# Patient Record
Sex: MALE | Race: WHITE | HISPANIC OR LATINO | Employment: OTHER | URBAN - METROPOLITAN AREA
[De-identification: names, ages, dates, MRNs, and addresses within clinical notes are randomized per-mention and may not be internally consistent; named-entity substitution may affect disease eponyms.]

---

## 2017-01-09 DIAGNOSIS — Z01.810 PREPROCEDURAL CARDIOVASCULAR EXAMINATION: ICD-10-CM

## 2017-01-09 DIAGNOSIS — Z01.812 PRE-PROCEDURAL LABORATORY EXAMINATIONS: ICD-10-CM

## 2017-01-09 LAB
ALBUMIN SERPL BCP-MCNC: 4.3 G/DL (ref 3.2–4.9)
ALBUMIN/GLOB SERPL: 1.5 G/DL
ALP SERPL-CCNC: 69 U/L (ref 30–99)
ALT SERPL-CCNC: 22 U/L (ref 2–50)
ANION GAP SERPL CALC-SCNC: 7 MMOL/L (ref 0–11.9)
AST SERPL-CCNC: 13 U/L (ref 12–45)
BASOPHILS # BLD AUTO: 0.4 % (ref 0–1.8)
BASOPHILS # BLD: 0.02 K/UL (ref 0–0.12)
BILIRUB SERPL-MCNC: 0.4 MG/DL (ref 0.1–1.5)
BUN SERPL-MCNC: 13 MG/DL (ref 8–22)
CALCIUM SERPL-MCNC: 9.3 MG/DL (ref 8.5–10.5)
CHLORIDE SERPL-SCNC: 103 MMOL/L (ref 96–112)
CO2 SERPL-SCNC: 27 MMOL/L (ref 20–33)
CREAT SERPL-MCNC: 0.87 MG/DL (ref 0.5–1.4)
EOSINOPHIL # BLD AUTO: 0.11 K/UL (ref 0–0.51)
EOSINOPHIL NFR BLD: 2 % (ref 0–6.9)
ERYTHROCYTE [DISTWIDTH] IN BLOOD BY AUTOMATED COUNT: 43.6 FL (ref 35.9–50)
GFR SERPL CREATININE-BSD FRML MDRD: >60 ML/MIN/1.73 M 2
GLOBULIN SER CALC-MCNC: 2.8 G/DL (ref 1.9–3.5)
GLUCOSE SERPL-MCNC: 119 MG/DL (ref 65–99)
HCT VFR BLD AUTO: 43.5 % (ref 42–52)
HGB BLD-MCNC: 14.7 G/DL (ref 14–18)
IMM GRANULOCYTES # BLD AUTO: 0.02 K/UL (ref 0–0.11)
IMM GRANULOCYTES NFR BLD AUTO: 0.4 % (ref 0–0.9)
INR PPP: 1.34 (ref 0.87–1.13)
LYMPHOCYTES # BLD AUTO: 1.37 K/UL (ref 1–4.8)
LYMPHOCYTES NFR BLD: 24.5 % (ref 22–41)
MCH RBC QN AUTO: 30.9 PG (ref 27–33)
MCHC RBC AUTO-ENTMCNC: 33.8 G/DL (ref 33.7–35.3)
MCV RBC AUTO: 91.6 FL (ref 81.4–97.8)
MONOCYTES # BLD AUTO: 0.39 K/UL (ref 0–0.85)
MONOCYTES NFR BLD AUTO: 7 % (ref 0–13.4)
NEUTROPHILS # BLD AUTO: 3.69 K/UL (ref 1.82–7.42)
NEUTROPHILS NFR BLD: 65.7 % (ref 44–72)
NRBC # BLD AUTO: 0 K/UL
NRBC BLD AUTO-RTO: 0 /100 WBC
PLATELET # BLD AUTO: 194 K/UL (ref 164–446)
PMV BLD AUTO: 11.3 FL (ref 9–12.9)
POTASSIUM SERPL-SCNC: 3.7 MMOL/L (ref 3.6–5.5)
PROT SERPL-MCNC: 7.1 G/DL (ref 6–8.2)
PROTHROMBIN TIME: 17 SEC (ref 12–14.6)
RBC # BLD AUTO: 4.75 M/UL (ref 4.7–6.1)
SODIUM SERPL-SCNC: 137 MMOL/L (ref 135–145)
WBC # BLD AUTO: 5.6 K/UL (ref 4.8–10.8)

## 2017-01-09 PROCEDURE — 85610 PROTHROMBIN TIME: CPT

## 2017-01-09 PROCEDURE — 36415 COLL VENOUS BLD VENIPUNCTURE: CPT

## 2017-01-09 PROCEDURE — 80053 COMPREHEN METABOLIC PANEL: CPT

## 2017-01-09 PROCEDURE — 85025 COMPLETE CBC W/AUTO DIFF WBC: CPT

## 2017-01-10 ENCOUNTER — RESOLUTE PROFESSIONAL BILLING HOSPITAL PROF FEE (OUTPATIENT)
Dept: CARDIOLOGY | Facility: MEDICAL CENTER | Age: 57
End: 2017-01-10
Payer: COMMERCIAL

## 2017-01-10 LAB — EKG IMPRESSION: NORMAL

## 2017-01-10 PROCEDURE — 700111 HCHG RX REV CODE 636 W/ 250 OVERRIDE (IP)

## 2017-01-10 RX ORDER — MIDAZOLAM HYDROCHLORIDE 1 MG/ML
INJECTION INTRAMUSCULAR; INTRAVENOUS
Status: DISPENSED
Start: 2017-01-10 | End: 2017-01-10

## 2017-01-11 PROCEDURE — 99217 PR OBSERVATION CARE DISCHARGE: CPT | Performed by: NURSE PRACTITIONER

## 2017-02-02 ENCOUNTER — NON-PROVIDER VISIT (OUTPATIENT)
Dept: CARDIOLOGY | Facility: MEDICAL CENTER | Age: 57
End: 2017-02-02
Payer: COMMERCIAL

## 2017-02-02 ENCOUNTER — OFFICE VISIT (OUTPATIENT)
Dept: CARDIOLOGY | Facility: MEDICAL CENTER | Age: 57
End: 2017-02-02
Payer: COMMERCIAL

## 2017-02-02 VITALS
WEIGHT: 272 LBS | HEIGHT: 69 IN | DIASTOLIC BLOOD PRESSURE: 78 MMHG | BODY MASS INDEX: 40.29 KG/M2 | SYSTOLIC BLOOD PRESSURE: 118 MMHG | OXYGEN SATURATION: 94 % | HEART RATE: 64 BPM

## 2017-02-02 VITALS
BODY MASS INDEX: 41.03 KG/M2 | DIASTOLIC BLOOD PRESSURE: 78 MMHG | HEART RATE: 64 BPM | WEIGHT: 278 LBS | SYSTOLIC BLOOD PRESSURE: 118 MMHG

## 2017-02-02 DIAGNOSIS — Z95.0 CARDIAC PACEMAKER IN SITU: ICD-10-CM

## 2017-02-02 DIAGNOSIS — G47.33 OSA (OBSTRUCTIVE SLEEP APNEA): ICD-10-CM

## 2017-02-02 DIAGNOSIS — Z79.01 CHRONIC ANTICOAGULATION: ICD-10-CM

## 2017-02-02 DIAGNOSIS — Z86.79 S/P ABLATION OF ATRIAL FIBRILLATION: ICD-10-CM

## 2017-02-02 DIAGNOSIS — I48.19 PERSISTENT ATRIAL FIBRILLATION (HCC): ICD-10-CM

## 2017-02-02 DIAGNOSIS — Z86.79 STATUS POST ABLATION OF ATRIAL FIBRILLATION: ICD-10-CM

## 2017-02-02 DIAGNOSIS — Z79.899 HIGH RISK MEDICATION USE: ICD-10-CM

## 2017-02-02 DIAGNOSIS — Z98.890 STATUS POST ABLATION OF ATRIAL FIBRILLATION: ICD-10-CM

## 2017-02-02 DIAGNOSIS — Z98.890 S/P ABLATION OF ATRIAL FIBRILLATION: ICD-10-CM

## 2017-02-02 PROCEDURE — 99214 OFFICE O/P EST MOD 30 MIN: CPT | Mod: 25 | Performed by: NURSE PRACTITIONER

## 2017-02-02 PROCEDURE — 93280 PM DEVICE PROGR EVAL DUAL: CPT | Performed by: INTERNAL MEDICINE

## 2017-02-02 ASSESSMENT — ENCOUNTER SYMPTOMS
HEMOPTYSIS: 0
VOMITING: 0
PALPITATIONS: 0
TREMORS: 0
WHEEZING: 0
HEARTBURN: 0
COUGH: 0
BLURRED VISION: 0
DIARRHEA: 0
SORE THROAT: 0
SHORTNESS OF BREATH: 0
BLOOD IN STOOL: 0
CHILLS: 0
ORTHOPNEA: 0
TINGLING: 0
WEIGHT LOSS: 0
FOCAL WEAKNESS: 0
PND: 0
SPUTUM PRODUCTION: 0
NAUSEA: 0
HEADACHES: 0
SPEECH CHANGE: 0
ABDOMINAL PAIN: 0
DOUBLE VISION: 0
LOSS OF CONSCIOUSNESS: 0
SENSORY CHANGE: 0
FEVER: 0
DIZZINESS: 0
WEAKNESS: 0

## 2017-02-02 NOTE — MR AVS SNAPSHOT
"        Juan Martel   2017 2:00 PM   Office Visit   MRN: [id number]    Department:  [facility]   Dept Phone:  [phone]    Description:  Male : 1960   Provider:  ALEX Rodriguez           Reason for Visit     Hospital Follow-up           Allergies as of 2017     Allergen Noted Reactions    Bloodless 2016       Codeine 2016   Vomiting    Tramadol 2017   Vomiting, Nausea      You were diagnosed with     Status post ablation of atrial fibrillation   [[id number]]         Vital Signs     Blood Pressure Pulse Height Weight Body Mass Index Oxygen Saturation    118/78 mmHg 64 1.753 m (5' 9.02\") 123.378 kg (272 lb) 40.15 kg/m2 94%    Smoking Status                   Never Smoker            Basic Information     Date Of Birth Sex Race Ethnicity Preferred Language    1960 Male White  Origin (Telugu,Malagasy,Haitian,Dominican, etc) English      Your appointments     Mar 22, 2017  3:00 PM   PACER CHECK ONLY with PACER CHECK-[facility]   [facility] (--)    [address], [address]  [place] [id number] [phone]            Mar 22, 2017  3:40 PM   FOLLOW UP with Darion Raza M.D.   [facility] (--)    [address], [address]  [place] [address]   [phone]              Problem List              ICD-10-CM Priority Class Noted - Resolved    Near syncope R55   2016 - Present    Nausea and vomiting R11.2   2016 - Present    Chest pain R07.9   2016 - Present    Atrial fibrillation (CMS-[facility]) I48.91   2016 - Present    Chronic anticoagulation Z79.01   2016 - Present    Snoring R06.83   2016 - Present    ARCHANA (obstructive sleep apnea) G47.33   6/15/2016 - Present    High risk medication use Z79.899   2016 - Present    AF (atrial fibrillation) (CMS-[facility]) I48.91   2016 - Present    Cardiac pacemaker in situ Z95.0   2016 - Present      Health Maintenance "        Date Due Completion Dates    IMM DTaP/Tdap/Td Vaccine (1 - Tdap) 3/14/1977 ---    COLONOSCOPY 3/14/2008 ---    IMM INFLUENZA (1) 9/1/2016 ---            Results       Current Immunizations     No immunizations on file.      Below and/or attached are the medications your provider expects you to take. Review all of your home medications and newly ordered medications with your provider and/or pharmacist. Follow medication instructions as directed by your provider and/or pharmacist. Please keep your medication list with you and share with your provider. Update the information when medications are discontinued, doses are changed, or new medications (including over-the-counter products) are added; and carry medication information at all times in the event of emergency situations     Allergies:  BLOODLESS - (reactions not documented)     CODEINE - Vomiting     TRAMADOL - Vomiting,Nausea               Medications  Valid as of: February 02, 2017 -  2:34 PM    Generic Name Brand Name Tablet Size Instructions for use    Allopurinol (Tab) ZYLOPRIM 300 MG Take 300 mg by mouth every evening.        Dofetilide (Cap) TIKOSYN 500 MCG Take 1 Cap by mouth every 12 hours.        Magnesium Chloride (Tab CR) SLO- (64 MG) MG Take 1 Tab by mouth 2 Times a Day.        Metoprolol Succinate (TABLET SR 24 HR) TOPROL XL 25 MG Take 1 Tab by mouth every day.        Omeprazole (CAPSULE DELAYED RELEASE) PRILOSEC 20 MG Take 1 Cap by mouth every morning before breakfast.        Rivaroxaban (Tab) XARELTO 20 MG Take 1 Tab by mouth with dinner.        Sucralfate (Tab) CARAFATE 1 GM Take 1 Tab by mouth 4 Times a Day,Before Meals and at Bedtime.        TraMADol HCl (Tab) ULTRAM 50 MG Take 1 Tab by mouth every 8 hours as needed for Moderate Pain.        .                 Medicines prescribed today were sent to:     [facility]/PHARMACY #[id number] - [place], [place] - [address]    [address] [place] [address]    Phone: [phone] Fax: [phone]     Open 24 Hours?: No      Medication refill instructions:       If your prescription bottle indicates you have medication refills left, it is not necessary to call your provider’s office. Please contact your pharmacy and they will refill your medication.    If your prescription bottle indicates you do not have any refills left, you may request refills at any time through one of the following ways: The online [employer] system (except Urgent Care), by calling your provider’s office, or by asking your pharmacy to contact your provider’s office with a refill request. Medication refills are processed only during regular business hours and may not be available until the next business day. Your provider may request additional information or to have a follow-up visit with you prior to refilling your medication.   *Please Note: Medication refills are assigned a new Rx number when refilled electronically. Your pharmacy may indicate that no refills were authorized even though a new prescription for the same medication is available at the pharmacy. Please request the medicine by name with the pharmacy before contacting your provider for a refill.           [employer] Access Code: [email or web address]  Expires: 3/1/2017 10:47 AM    [employer]  A secure, online tool to manage your health information     [employer]’s [employer]® is a secure, online tool that connects you to your personalized health information from the privacy of your home -- day or night - making it very easy for you to manage your healthcare. Once the activation process is completed, you can even access your medical information using the [employer] drew, which is available for free in the Apple Drew store or Google Play store.     [employer] provides the following levels of access (as shown below):   My Chart Features   [facility] Doctor [facility]  Specialists [facility]  Urgent  Care [facility]  Primary Care  Doctor   Email your healthcare team securely and privately 24/7 X X X     Manage appointments: schedule your next appointment; view details of past/upcoming appointments X      Request prescription refills. X      View recent personal medical records, including lab and immunizations X X X X   View health record, including health history, allergies, medications X X X X   Read reports about your outpatient visits, procedures, consult and ER notes X X X X   See your discharge summary, which is a recap of your hospital and/or ER visit that includes your diagnosis, lab results, and care plan. X X       How to register for [employer]:  1. Go to  [email or web address].  2. Click on the Sign Up Now box, which takes you to the New Member Sign Up page. You will need to provide the following information:  a. Enter your Op[employer] Access Code exactly as it appears at the top of this page. (You will not need to use this code after you’ve completed the sign-up process. If you do not sign up before the expiration date, you must request a new code.)   b. Enter your date of birth.   c. Enter your home email address.   d. Click Submit, and follow the next screen’s instructions.  3. Create a Opzi ID. This will be your Opzi login ID and cannot be changed, so think of one that is secure and easy to remember.  4. Create a Opzi password. You can change your password at any time.  5. Enter your Password Reset Question and Answer. This can be used at a later time if you forget your password.   6. Enter your e-mail address. This allows you to receive e-mail notifications when new information is available in Opzi.  7. Click Sign Up. You can now view your health information.    For assistance activating your Opzi account, call [phone]

## 2017-02-02 NOTE — NON-PROVIDER
Final threshold testing today. Appropriate device function demonstrated. Wound site appears clear. See back in 3 months.

## 2017-02-02 NOTE — PROGRESS NOTES
Subjective:   Juan Martel is a 56 y.o. male who presents today for hospital follow up after EPS and ablation by Dr. Raza 1/10/17.      History significant for Afib, chronic anticoagulation, BSI PPM 12/16, ARCHANA with compliant CPAP use.      Procedural conclusions per Dr. Raza' op note:  CONCLUSION:  1.  Successful isolation of all 4 pulmonary veins.  2.  Induction of atrial flutter.  3.  Successful cavotricuspid isthmus line to eliminate this flutter.  4.  No inducible tachycardia at the end of the study.   DISCUSSION:  Patient did very well with the procedure.  He will continue with    his Tikosyn for now.  He will continue his oral anticoagulation.  We will give   him a month of sucralfate and Protonix.  I think that our result is quite    promising.  We will see how the patient does clinically.    Today in follow up he states that he has overall been feeling very well post ablation.  He states that he had a few days of chest discomfort and fatigue immedicately post ablation, but resolved and has not retuned.  He isn't really aware of having any afib post ablation.    He has had no chest pain, dyspnea, dizziness, presyncope, syncope, peripheral edema.      He states that he as been compliant with his medications but did forget his Tikosyn one night.  He has been compliant with his CPAP.  He has been walking daily, trying to get between 5,000 and 7,000 steps a day.     Past Medical History   Diagnosis Date   • Gout    • A-fib (CMS-HCC)    • At risk for sleep apnea    • Cardiac pacemaker in situ 12/20/2016   • Sleep apnea      CPAP   • Pneumonia 2000   • Cardiac asystole (CMS-HCC) 12/20/16     possible vaso-vagal     Past Surgical History   Procedure Laterality Date   • Other orthopedic surgery  6/1/1990     achilles tendon repair left foot   • Other cardiac surgery  12/20/16     pacemaker     Family History   Problem Relation Age of Onset   • Other Mother    • Heart Disease Mother      atrial fib   • Stroke  Father 65     CVa   • Diabetes Father    • Stroke Paternal Grandmother    • Cancer Sister      History   Smoking status   • Never Smoker    Smokeless tobacco   • Never Used     Allergies   Allergen Reactions   • Bloodless    • Codeine Vomiting   • Tramadol Vomiting and Nausea     Outpatient Encounter Prescriptions as of 2/2/2017   Medication Sig Dispense Refill   • omeprazole (PRILOSEC) 20 MG delayed-release capsule Take 1 Cap by mouth every morning before breakfast. 30 Cap 0   • sucralfate (CARAFATE) 1 GM Tab Take 1 Tab by mouth 4 Times a Day,Before Meals and at Bedtime. 120 Tab 0   • tramadol (ULTRAM) 50 MG Tab Take 1 Tab by mouth every 8 hours as needed for Moderate Pain. 20 Tab 0   • metoprolol SR (TOPROL XL) 25 MG TABLET SR 24 HR Take 1 Tab by mouth every day. 30 Tab 3   • magnesium chloride SR (SLO-MAG) 535 (64 MG) MG Tab CR Take 1 Tab by mouth 2 Times a Day. 60 Tab 0   • dofetilide (TIKOSYN) 500 MCG Cap Take 1 Cap by mouth every 12 hours. 60 Cap 4   • rivaroxaban (XARELTO) 20 MG Tab tablet Take 1 Tab by mouth with dinner. 30 Tab 11   • allopurinol (ZYLOPRIM) 300 MG Tab Take 300 mg by mouth every evening.       No facility-administered encounter medications on file as of 2/2/2017.     Review of Systems   Constitutional: Negative for fever, chills, weight loss and malaise/fatigue.   HENT: Negative for congestion, nosebleeds, sore throat and tinnitus.    Eyes: Negative for blurred vision and double vision.   Respiratory: Negative for cough, hemoptysis, sputum production, shortness of breath and wheezing.    Cardiovascular: Negative for chest pain, palpitations, orthopnea, leg swelling and PND.   Gastrointestinal: Negative for heartburn, nausea, vomiting, abdominal pain, diarrhea, blood in stool and melena.   Genitourinary: Negative.    Musculoskeletal: Positive for joint pain (Occasional L knee- sore).   Skin: Negative for rash.   Neurological: Negative for dizziness, tingling, tremors, sensory change, speech  "change, focal weakness, loss of consciousness, weakness and headaches.        Objective:   /78 mmHg  Pulse 64  Ht 1.753 m (5' 9.02\")  Wt 123.378 kg (272 lb)  BMI 40.15 kg/m2  SpO2 94%    Physical Exam   Constitutional: He is oriented to person, place, and time. He appears well-developed and well-nourished.   HENT:   Head: Normocephalic and atraumatic.   Eyes: Conjunctivae are normal.   Neck: Normal range of motion. Neck supple. No JVD present.   Cardiovascular: Normal rate, regular rhythm, normal heart sounds and intact distal pulses.  Exam reveals no gallop and no friction rub.    No murmur heard.  Pulmonary/Chest: Effort normal and breath sounds normal. No respiratory distress. He has no wheezes. He has no rales. He exhibits no tenderness.   L chest PPM pocket well healed   Abdominal: Soft. Bowel sounds are normal. He exhibits no distension. There is no tenderness.   Musculoskeletal: He exhibits no edema.   Neurological: He is alert and oriented to person, place, and time.   Skin: Skin is warm and dry.   Psychiatric: He has a normal mood and affect. His behavior is normal.       Assessment:     1. Status post ablation of atrial fibrillation  EKG   2. Persistent atrial fibrillation (CMS-HCC)     3. Chronic anticoagulation     4. ARCHANA (obstructive sleep apnea)     5. High risk medication use     6. Cardiac pacemaker in situ     7. S/P ablation of atrial fibrillation         Medical Decision Making:  Today's Assessment / Status / Plan:   1. Afib S/P ablation:  - 12 lead EKG today NSR.  He is clinically recovering well post procedure.  BSI PPM interrogation today reveals about 10 afib episodes post ablation, most lasting <16 seconds in length.  One episode was 11 hours long - he thinks this may be when he forgot his Tikosyn.  All at nighttime.  Will continue to monitor this and Afib burden as he progresses post procedure.  - Continue Toprol, Tikosyn, Xarelto.  Continue Carafate and Prilosec until bottles " completed.   - Re rereviewed expected course of healing post ablation.     2. High Risk medication (Tikosyn):  - QTc 470/480 msec today and within limits.  Will monitor at next office visit.  - Last renal function and electrolytes WNL 1/11.     3. BSI PPM:  - Device working appropriately.  See device flow sheet for details.  - L chest Pocket well healed without erythema or induration.    4. Anticoagulation:  - denies s/s of bleeding.    Overall status today is stable.  We discussed he can bike with low resistance, easy pace as his knee allows.  Discussed HR parameters.      Discussed plan of care with the patient and he verbalizes understanding/agreement.  RTC in 6 weeks for review, sooner if needed.  Collaborating MD/ADD: Bhumika.

## 2017-02-02 NOTE — MR AVS SNAPSHOT
Juan Martel   2017 1:00 PM   Non-Provider Visit   MRN: [id number]    Department:  [facility]   Dept Phone:  [phone]    Description:  Male : 1960   Provider:  PACER CHECK-[facility]           Reason for Visit     Device Check           Allergies as of 2017     Allergen Noted Reactions    Bloodless 2016       Codeine 2016   Vomiting    Tramadol 2017   Vomiting, Nausea      You were diagnosed with     Cardiac pacemaker in situ   [V45.01.ICD-9-CM]         Vital Signs     Blood Pressure Pulse Weight Smoking Status          118/78 mmHg 64 126.1 kg (278 lb) Never Smoker         Basic Information     Date Of Birth Sex Race Ethnicity Preferred Language    1960 Male White  Origin (Wallisian,Anguillan,Mauritian,Larry, etc) English      Your appointments     Mar 22, 2017  3:00 PM   PACER CHECK ONLY with PACER CHECK-CAM B   [facility] (--)    [address], [address]  [place] [address] [phone]            Mar 22, 2017  3:40 PM   FOLLOW UP with Darion Raza M.D.   [facility] (--)    [address], [address]  [place] [address] [phone]              Problem List              ICD-10-CM Priority Class Noted - Resolved    Near syncope R55   2016 - Present    Nausea and vomiting R11.2   2016 - Present    Chest pain R07.9   2016 - Present    Atrial fibrillation (CMS-HCC) I48.91   2016 - Present    Chronic anticoagulation Z79.01   2016 - Present    Snoring R06.83   2016 - Present    ARCHANA (obstructive sleep apnea) G47.33   6/15/2016 - Present    High risk medication use Z79.899   2016 - Present    AF (atrial fibrillation) (CMS-HCC) I48.91   2016 - Present    Cardiac pacemaker in situ Z95.0   2016 - Present      Health Maintenance        Date Due Completion Dates    IMM DTaP/Tdap/Td Vaccine (1 - Tdap) 3/14/1977 ---    COLONOSCOPY 3/14/2008  ---    IMM INFLUENZA (1) 9/1/2016 ---            Current Immunizations     No immunizations on file.      Below and/or attached are the medications your provider expects you to take. Review all of your home medications and newly ordered medications with your provider and/or pharmacist. Follow medication instructions as directed by your provider and/or pharmacist. Please keep your medication list with you and share with your provider. Update the information when medications are discontinued, doses are changed, or new medications (including over-the-counter products) are added; and carry medication information at all times in the event of emergency situations     Allergies:  BLOODLESS - (reactions not documented)     CODEINE - Vomiting     TRAMADOL - Vomiting,Nausea               Medications  Valid as of: February 02, 2017 -  2:32 PM    Generic Name Brand Name Tablet Size Instructions for use    Allopurinol (Tab) ZYLOPRIM 300 MG Take 300 mg by mouth every evening.        Dofetilide (Cap) TIKOSYN 500 MCG Take 1 Cap by mouth every 12 hours.        Magnesium Chloride (Tab CR) SLO- (64 MG) MG Take 1 Tab by mouth 2 Times a Day.        Metoprolol Succinate (TABLET SR 24 HR) TOPROL XL 25 MG Take 1 Tab by mouth every day.        Omeprazole (CAPSULE DELAYED RELEASE) PRILOSEC 20 MG Take 1 Cap by mouth every morning before breakfast.        Rivaroxaban (Tab) XARELTO 20 MG Take 1 Tab by mouth with dinner.        Sucralfate (Tab) CARAFATE 1 GM Take 1 Tab by mouth 4 Times a Day,Before Meals and at Bedtime.        TraMADol HCl (Tab) ULTRAM 50 MG Take 1 Tab by mouth every 8 hours as needed for Moderate Pain.        .                 Medicines prescribed today were sent to:     [facility]/PHARMACY #[id number] - [place], [place] - [address] [address] [address] [place] [address]    Phone: [phone] Fax: [phone]    Open 24 Hours?: No      Medication refill instructions:       If your prescription bottle indicates you have medication refills  left, it is not necessary to call your provider’s office. Please contact your pharmacy and they will refill your medication.    If your prescription bottle indicates you do not have any refills left, you may request refills at any time through one of the following ways: The online [employer] system (except Urgent Care), by calling your provider’s office, or by asking your pharmacy to contact your provider’s office with a refill request. Medication refills are processed only during regular business hours and may not be available until the next business day. Your provider may request additional information or to have a follow-up visit with you prior to refilling your medication.   *Please Note: Medication refills are assigned a new Rx number when refilled electronically. Your pharmacy may indicate that no refills were authorized even though a new prescription for the same medication is available at the pharmacy. Please request the medicine by name with the pharmacy before contacting your provider for a refill.           [employer] Access Code: [email or web address]  Expires: 3/1/2017 10:47 AM    [employer]  A secure, online tool to manage your health information     [employer]s [employer]® is a secure, online tool that connects you to your personalized health information from the privacy of your home -- day or night - making it very easy for you to manage your healthcare. Once the activation process is completed, you can even access your medical information using the [employer] drew, which is available for free in the Apple Drew store or Google Play store.     [employer] 28 provides the following levels of access (as shown below):   My Chart Features   [facility] Doctor [facility]  Specialists [facility]  Urgent  Care Non-[facility]  Primary Care  Doctor   Email your healthcare team securely and privately 24/7 X X X    Manage appointments: schedule your next appointment; view details of past/upcoming appointments X      Request prescription  refills. X      View recent personal medical records, including lab and immunizations X X X X   View health record, including health history, allergies, medications X X X X   Read reports about your outpatient visits, procedures, consult and ER notes X X X X   See your discharge summary, which is a recap of your hospital and/or ER visit that includes your diagnosis, lab results, and care plan. X X       How to register for [employer]:  1. Go to  [email or web address].  2. Click on the Sign Up Now box, which takes you to the New Member Sign Up page. You will need to provide the following information:  a. Enter your [employer] Access Code exactly as it appears at the top of this page. (You will not need to use this code after you’ve completed the sign-up process. If you do not sign up before the expiration date, you must request a new code.)   b. Enter your date of birth.   c. Enter your home email address.   d. Click Submit, and follow the next screen’s instructions.  3. Create a [employer] ID. This will be your [employer] login ID and cannot be changed, so think of one that is secure and easy to remember.  4. Create a [employer] password. You can change your password at any time.  5. Enter your Password Reset Question and Answer. This can be used at a later time if you forget your password.   6. Enter your e-mail address. This allows you to receive e-mail notifications when new information is available in [employer].  7. Click Sign Up. You can now view your health information.    For assistance activating your [employer] account, call [phone]

## 2017-02-02 NOTE — Clinical Note
[facility]   [address]  [place] [address]  Phone: [phone]  Fax: [phone]              Juan Martel  1960    Encounter Date: 2/2/2017    ALEX Rodriguez          PROGRESS NOTE:  Subjective:   Juan Martel is a 56 y.o. male who presents today for hospital follow up after EPS and ablation by Dr. Raza 1/10/17.      History significant for Afib, chronic anticoagulation, BSI PPM 12/16, ARCHANA with compliant CPAP use.      Procedural conclusions per Dr. Raza' op note:  CONCLUSION:  1.  Successful isolation of all 4 pulmonary veins.  2.  Induction of atrial flutter.  3.  Successful cavotricuspid isthmus line to eliminate this flutter.  4.  No inducible tachycardia at the end of the study.   DISCUSSION:  Patient did very well with the procedure.  He will continue with    his Tikosyn for now.  He will continue his oral anticoagulation.  We will give   him a month of sucralfate and Protonix.  I think that our result is quite    promising.  We will see how the patient does clinically.    Today in follow up he states that he has overall been feeling very well post ablation.  He states that he had a few days of chest discomfort and fatigue immedicately post ablation, but resolved and has not retuned.  He isn't really aware of having any afib post ablation.    He has had no chest pain, dyspnea, dizziness, presyncope, syncope, peripheral edema.      He states that he as been compliant with his medications but did forget his Tikosyn one night.  He has been compliant with his CPAP.  He has been walking daily, trying to get between 5,000 and 7,000 steps a day.     Past Medical History   Diagnosis Date   • Gout    • A-fib (CMS-[facility])    • At risk for sleep apnea    • Cardiac pacemaker in situ 12/20/2016   • Sleep apnea      CPAP   • Pneumonia 2000   • Cardiac asystole (CMS-[facility]) 12/20/16     possible vaso-vagal     Past Surgical History   Procedure  Laterality Date   • Other orthopedic surgery  6/1/1990     achilles tendon repair left foot   • Other cardiac surgery  12/20/16     pacemaker     Family History   Problem Relation Age of Onset   • Other Mother    • Heart Disease Mother      atrial fib   • Stroke Father 65     CVa   • Diabetes Father    • Stroke Paternal Grandmother    • Cancer Sister      History   Smoking status   • Never Smoker    Smokeless tobacco   • Never Used     Allergies   Allergen Reactions   • Bloodless    • Codeine Vomiting   • Tramadol Vomiting and Nausea     Outpatient Encounter Prescriptions as of 2/2/2017   Medication Sig Dispense Refill   • omeprazole (PRILOSEC) 20 MG delayed-release capsule Take 1 Cap by mouth every morning before breakfast. 30 Cap 0   • sucralfate (CARAFATE) 1 GM Tab Take 1 Tab by mouth 4 Times a Day,Before Meals and at Bedtime. 120 Tab 0   • tramadol (ULTRAM) 50 MG Tab Take 1 Tab by mouth every 8 hours as needed for Moderate Pain. 20 Tab 0   • metoprolol SR (TOPROL XL) 25 MG TABLET SR 24 HR Take 1 Tab by mouth every day. 30 Tab 3   • magnesium chloride SR (SLO-MAG) 535 (64 MG) MG Tab CR Take 1 Tab by mouth 2 Times a Day. 60 Tab 0   • dofetilide (TIKOSYN) 500 MCG Cap Take 1 Cap by mouth every 12 hours. 60 Cap 4   • rivaroxaban (XARELTO) 20 MG Tab tablet Take 1 Tab by mouth with dinner. 30 Tab 11   • allopurinol (ZYLOPRIM) 300 MG Tab Take 300 mg by mouth every evening.       No facility-administered encounter medications on file as of 2/2/2017.     Review of Systems   Constitutional: Negative for fever, chills, weight loss and malaise/fatigue.   HENT: Negative for congestion, nosebleeds, sore throat and tinnitus.    Eyes: Negative for blurred vision and double vision.   Respiratory: Negative for cough, hemoptysis, sputum production, shortness of breath and wheezing.    Cardiovascular: Negative for chest pain, palpitations, orthopnea, leg swelling and PND.   Gastrointestinal: Negative for heartburn, nausea, vomiting,  "abdominal pain, diarrhea, blood in stool and melena.   Genitourinary: Negative.    Musculoskeletal: Positive for joint pain (Occasional L knee- sore).   Skin: Negative for rash.   Neurological: Negative for dizziness, tingling, tremors, sensory change, speech change, focal weakness, loss of consciousness, weakness and headaches.        Objective:   /78 mmHg  Pulse 64  Ht 1.753 m (5' 9.02\")  Wt 123.378 kg (272 lb)  BMI 40.15 kg/m2  SpO2 94%    Physical Exam   Constitutional: He is oriented to person, place, and time. He appears well-developed and well-nourished.   HENT:   Head: Normocephalic and atraumatic.   Eyes: Conjunctivae are normal.   Neck: Normal range of motion. Neck supple. No JVD present.   Cardiovascular: Normal rate, regular rhythm, normal heart sounds and intact distal pulses.  Exam reveals no gallop and no friction rub.    No murmur heard.  Pulmonary/Chest: Effort normal and breath sounds normal. No respiratory distress. He has no wheezes. He has no rales. He exhibits no tenderness.   L chest PPM pocket well healed   Abdominal: Soft. Bowel sounds are normal. He exhibits no distension. There is no tenderness.   Musculoskeletal: He exhibits no edema.   Neurological: He is alert and oriented to person, place, and time.   Skin: Skin is warm and dry.   Psychiatric: He has a normal mood and affect. His behavior is normal.       Assessment:     1. Status post ablation of atrial fibrillation  EKG   2. Persistent atrial fibrillation (CMS-HCC)     3. Chronic anticoagulation     4. ARCHANA (obstructive sleep apnea)     5. High risk medication use     6. Cardiac pacemaker in situ     7. S/P ablation of atrial fibrillation         Medical Decision Making:  Today's Assessment / Status / Plan:   1. Afib S/P ablation:  - 12 lead EKG today NSR.  He is clinically recovering well post procedure.  BSI PPM interrogation today reveals about 10 afib episodes post ablation, most lasting <16 seconds in length.  One " episode was 11 hours long - he thinks this may be when he forgot his Tikosyn.  All at nighttime.  Will continue to monitor this and Afib burden as he progresses post procedure.  - Continue Toprol, Tikosyn, Xarelto.  Continue Carafate and Prilosec until bottles completed.   - Re rereviewed expected course of healing post ablation.     2. High Risk medication (Tikosyn):  - QTc 470/480 msec today and within limits.  Will monitor at next office visit.  - Last renal function and electrolytes WNL 1/11.     3. BSI PPM:  - Device working appropriately.  See device flow sheet for details.  - L chest Pocket well healed without erythema or induration.    4. Anticoagulation:  - denies s/s of bleeding.    Overall status today is stable.  We discussed he can bike with low resistance, easy pace as his knee allows.  Discussed HR parameters.      Discussed plan of care with the patient and he verbalizes understanding/agreement.  RTC in 6 weeks for review, sooner if needed.  Collaborating MD/ADD: Ruth Burgos D.O.  [address]   [address]  [facility] [address]  VIA Facsimile: [phone]

## 2017-02-04 LAB — EKG IMPRESSION: NORMAL

## 2017-02-21 ENCOUNTER — TELEPHONE (OUTPATIENT)
Dept: CARDIOLOGY | Facility: MEDICAL CENTER | Age: 57
End: 2017-02-21

## 2017-02-21 NOTE — TELEPHONE ENCOUNTER
----- Message from Wen Madrigal sent at 2/21/2017  7:31 AM PST -----  Regarding: Dental clearance needed asap   ADRIENNE/Alejandra    Please call Bisi at Dr Carlson's office (dentist) at [phone] or [phone] asap. Patient is in their office now for a dental cleaning and they need to get clearance.

## 2017-02-21 NOTE — TELEPHONE ENCOUNTER
Spoke with  to advise that pt. Will need to wait to schedule dental procedure.   Awaiting Roberta's recommendations re: when he can schedule procedure. Will pt. Need prophylactic antibiotics?

## 2017-02-27 NOTE — TELEPHONE ENCOUNTER
Message  Received: Today       ALEX Rodriguez L.P.N.       Caller: Unspecified (6 days ago, 9:00 AM)                     Prefer that he wait 90 days post PPM                     Called Dr. Bui's  to advise. She will forward message to Bisi, hygienist. Procedure was done 1-10-17.

## 2017-03-22 ENCOUNTER — NON-PROVIDER VISIT (OUTPATIENT)
Dept: CARDIOLOGY | Facility: MEDICAL CENTER | Age: 57
End: 2017-03-22
Payer: COMMERCIAL

## 2017-03-22 ENCOUNTER — OFFICE VISIT (OUTPATIENT)
Dept: CARDIOLOGY | Facility: MEDICAL CENTER | Age: 57
End: 2017-03-22
Payer: COMMERCIAL

## 2017-03-22 VITALS
OXYGEN SATURATION: 96 % | HEIGHT: 69 IN | SYSTOLIC BLOOD PRESSURE: 156 MMHG | WEIGHT: 286 LBS | HEART RATE: 72 BPM | DIASTOLIC BLOOD PRESSURE: 76 MMHG | BODY MASS INDEX: 42.36 KG/M2

## 2017-03-22 DIAGNOSIS — I48.91 ATRIAL FIBRILLATION, UNSPECIFIED TYPE (HCC): ICD-10-CM

## 2017-03-22 DIAGNOSIS — Z95.0 CARDIAC PACEMAKER IN SITU: ICD-10-CM

## 2017-03-22 LAB — EKG IMPRESSION: NORMAL

## 2017-03-22 PROCEDURE — 93000 ELECTROCARDIOGRAM COMPLETE: CPT | Mod: 59 | Performed by: INTERNAL MEDICINE

## 2017-03-22 PROCEDURE — 99214 OFFICE O/P EST MOD 30 MIN: CPT | Mod: 25 | Performed by: INTERNAL MEDICINE

## 2017-03-22 PROCEDURE — 93280 PM DEVICE PROGR EVAL DUAL: CPT | Performed by: INTERNAL MEDICINE

## 2017-03-22 RX ORDER — DOFETILIDE 0.25 MG/1
500 CAPSULE ORAL EVERY 12 HOURS
Qty: 60 CAP | Refills: 2 | Status: SHIPPED | OUTPATIENT
Start: 2017-03-22 | End: 2017-04-19 | Stop reason: SDUPTHER

## 2017-03-22 NOTE — PROGRESS NOTES
Subjective:   Juan Martel is a 57 y.o. male who presents today for follow up of a fib on tikosyn s/p ablation.    No a fib since 2/2 in device  Not feeling any a fib.  Doing well    todays bp uncharacteristically high usually much better control.    Past Medical History   Diagnosis Date   • Gout    • A-fib (CMS-[facility])    • At risk for sleep apnea    • Cardiac pacemaker in situ 12/20/2016   • Sleep apnea      CPAP   • Pneumonia 2000   • Cardiac asystole (CMS-[facility]) 12/20/16     possible vaso-vagal     Past Surgical History   Procedure Laterality Date   • Other orthopedic surgery  6/1/1990     achilles tendon repair left foot   • Other cardiac surgery  12/20/16     pacemaker     Family History   Problem Relation Age of Onset   • Other Mother    • Heart Disease Mother      atrial fib   • Stroke Father 65     CVa   • Diabetes Father    • Stroke Paternal Grandmother    • Cancer Sister      History   Smoking status   • Never Smoker    Smokeless tobacco   • Never Used     Allergies   Allergen Reactions   • Bloodless    • Codeine Vomiting   • Tramadol Vomiting and Nausea     Outpatient Encounter Prescriptions as of 3/22/2017   Medication Sig Dispense Refill   • dofetilide (TIKOSYN) 250 MCG Cap Take 2 Caps by mouth every 12 hours. 60 Cap 2   • metoprolol SR (TOPROL XL) 25 MG TABLET SR 24 HR Take 1 Tab by mouth every day. 30 Tab 3   • magnesium chloride SR (SLO-MAG) 535 (64 MG) MG Tab CR Take 1 Tab by mouth 2 Times a Day. 60 Tab 0   • rivaroxaban (XARELTO) 20 MG Tab tablet Take 1 Tab by mouth with dinner. 30 Tab 11   • allopurinol (ZYLOPRIM) 300 MG Tab Take 300 mg by mouth every evening.     • omeprazole (PRILOSEC) 20 MG delayed-release capsule Take 1 Cap by mouth every morning before breakfast. (Patient not taking: Reported on 3/22/2017) 30 Cap 0   • sucralfate (CARAFATE) 1 GM Tab Take 1 Tab by mouth 4 Times a Day,Before Meals and at Bedtime. (Patient not taking: Reported on 3/22/2017) 120 Tab 0   • tramadol (ULTRAM) 50 MG  "Tab Take 1 Tab by mouth every 8 hours as needed for Moderate Pain. (Patient not taking: Reported on 3/22/2017) 20 Tab 0   • [DISCONTINUED] dofetilide (TIKOSYN) 500 MCG Cap Take 1 Cap by mouth every 12 hours. 60 Cap 4     No facility-administered encounter medications on file as of 3/22/2017.     Review of Systems   Cardiovascular: Negative for palpitations.        Objective:   /76 mmHg  Pulse 72  Ht 1.753 m (5' 9.02\")  Wt 129.729 kg (286 lb)  BMI 42.22 kg/m2  SpO2 96%  Recheck 132/88    Physical Exam   Constitutional: He is oriented to person, place, and time. He appears well-developed and well-nourished. No distress.   obese   HENT:   Head: Normocephalic and atraumatic.   Right Ear: External ear normal.   Left Ear: External ear normal.   Nose: Nose normal.   Mouth/Throat: Oropharynx is clear and moist.   Eyes: Conjunctivae and EOM are normal. Pupils are equal, round, and reactive to light. Right eye exhibits no discharge. Left eye exhibits no discharge. No scleral icterus.   Neck: Normal range of motion. Neck supple. No JVD present. No tracheal deviation present.   Cardiovascular: Normal rate, regular rhythm, normal heart sounds and intact distal pulses.  Exam reveals no gallop and no friction rub.    No murmur heard.  Device pocket well helaed   Pulmonary/Chest: Effort normal and breath sounds normal. No stridor. No respiratory distress. He has no wheezes. He has no rales. He exhibits no tenderness.   Abdominal: Soft. He exhibits no distension. There is no tenderness.   Musculoskeletal: He exhibits no edema or tenderness.   Neurological: He is alert and oriented to person, place, and time. No cranial nerve deficit. Coordination normal.   Skin: Skin is warm and dry. No rash noted. He is not diaphoretic. No erythema. No pallor.   Psychiatric: He has a normal mood and affect. His behavior is normal. Judgment and thought content normal.   Vitals reviewed.      Assessment:     1. Atrial fibrillation, " unspecified type (CMS-HCC)  EKG    dofetilide (TIKOSYN) 250 MCG Cap     No a fib since 2/2  Medical Decision Making:  Today's Assessment / Status / Plan:     A fib- continue oac- wean tikosyn  Sss- device functions well  htn- he will follow closely and will add lisinopril if stays up

## 2017-03-22 NOTE — MR AVS SNAPSHOT
"        Juan Martel   3/22/2017 3:40 PM   Office Visit   MRN: [id number]    Department:  [facility]   Dept Phone:  [phone]    Description:  Male : 1960   Provider:  Darion Raza M.D.           Reason for Visit     Follow-Up           Allergies as of 3/22/2017     Allergen Noted Reactions    Bloodless 2016       Codeine 2016   Vomiting    Tramadol 2017   Vomiting, Nausea      You were diagnosed with     Atrial fibrillation, unspecified type (CMS-HCC)   [[id number]]         Vital Signs     Blood Pressure Pulse Height Weight Body Mass Index Oxygen Saturation    156/76 mmHg 72 1.753 m (5' 9.02\") 129.729 kg (286 lb) 42.22 kg/m2 96%    Smoking Status                   Never Smoker            Basic Information     Date Of Birth Sex Race Ethnicity Preferred Language    1960 Male White  Origin (Malay,Algerian,[place]ese,[place]ry, etc) English      Problem List              ICD-10-CM Priority Class Noted - Resolved    Near syncope R55   2016 - Present    Nausea and vomiting R11.2   2016 - Present    Chest pain R07.9   2016 - Present    Atrial fibrillation (CMS-HCC) I48.91   2016 - Present    Chronic anticoagulation Z79.01   2016 - Present    Snoring R06.83   2016 - Present    ARCHANA (obstructive sleep apnea) G47.33   6/15/2016 - Present    High risk medication use Z79.899   2016 - Present    AF (atrial fibrillation) (CMS-HCC) I48.91   2016 - Present    Cardiac pacemaker in situ Z95.0   2016 - Present    S/P ablation of atrial fibrillation Z98.890, Z86.79   2017 - Present      Health Maintenance        Date Due Completion Dates    IMM DTaP/Tdap/Td Vaccine (1 - Tdap) 3/14/1977 ---    COLONOSCOPY 3/14/2008 ---    IMM INFLUENZA (1) 2016 ---            Results       Current Immunizations     No immunizations on file.      Below and/or attached are the medications your provider expects you to take. Review all of your home " medications and newly ordered medications with your provider and/or pharmacist. Follow medication instructions as directed by your provider and/or pharmacist. Please keep your medication list with you and share with your provider. Update the information when medications are discontinued, doses are changed, or new medications (including over-the-counter products) are added; and carry medication information at all times in the event of emergency situations     Allergies:  BLOODLESS - (reactions not documented)     CODEINE - Vomiting     TRAMADOL - Vomiting,Nausea               Medications  Valid as of: March 22, 2017 -  3:40 PM    Generic Name Brand Name Tablet Size Instructions for use    Allopurinol (Tab) ZYLOPRIM 300 MG Take 300 mg by mouth every evening.        Dofetilide (Cap) TIKOSYN 250 MCG Take 2 Caps by mouth every 12 hours.        Magnesium Chloride (Tab CR) SLO- (64 MG) MG Take 1 Tab by mouth 2 Times a Day.        Metoprolol Succinate (TABLET SR 24 HR) TOPROL XL 25 MG Take 1 Tab by mouth every day.        Omeprazole (CAPSULE DELAYED RELEASE) PRILOSEC 20 MG Take 1 Cap by mouth every morning before breakfast.        Rivaroxaban (Tab) XARELTO 20 MG Take 1 Tab by mouth with dinner.        Sucralfate (Tab) CARAFATE 1 GM Take 1 Tab by mouth 4 Times a Day,Before Meals and at Bedtime.        TraMADol HCl (Tab) ULTRAM 50 MG Take 1 Tab by mouth every 8 hours as needed for Moderate Pain.        .                 Medicines prescribed today were sent to:     [id number] - [place], [place] - [address] [address]    Phone: [phone] Fax: [phone]    Open 24 Hours?: No      Medication refill instructions:       If your prescription bottle indicates you have medication refills left, it is not necessary to call your provider’s office. Please contact your pharmacy and they will refill your medication.    If your prescription bottle indicates you do not have any refills left, you may  request refills at any time through one of the following ways: The online [employer] system (except Urgent Care), by calling your provider’s office, or by asking your pharmacy to contact your provider’s office with a refill request. Medication refills are processed only during regular business hours and may not be available until the next business day. Your provider may request additional information or to have a follow-up visit with you prior to refilling your medication.   *Please Note: Medication refills are assigned a new Rx number when refilled electronically. Your pharmacy may indicate that no refills were authorized even though a new prescription for the same medication is available at the pharmacy. Please request the medicine by name with the pharmacy before contacting your provider for a refill.           MyChart Status: Patient Declined

## 2017-04-08 DIAGNOSIS — I48.91 ATRIAL FIBRILLATION, UNSPECIFIED TYPE (HCC): ICD-10-CM

## 2017-04-09 ASSESSMENT — ENCOUNTER SYMPTOMS: PALPITATIONS: 0

## 2017-04-10 RX ORDER — DOFETILIDE 0.5 MG/1
CAPSULE ORAL
Qty: 60 CAP | Refills: 6 | Status: SHIPPED | OUTPATIENT
Start: 2017-04-10 | End: 2017-05-30

## 2017-04-10 RX ORDER — METOPROLOL SUCCINATE 25 MG/1
TABLET, EXTENDED RELEASE ORAL
Qty: 30 TAB | Refills: 6 | Status: SHIPPED | OUTPATIENT
Start: 2017-04-10 | End: 2017-07-05 | Stop reason: SDUPTHER

## 2017-04-19 ENCOUNTER — OFFICE VISIT (OUTPATIENT)
Dept: CARDIOLOGY | Facility: MEDICAL CENTER | Age: 57
End: 2017-04-19
Payer: COMMERCIAL

## 2017-04-19 VITALS
WEIGHT: 282 LBS | OXYGEN SATURATION: 95 % | SYSTOLIC BLOOD PRESSURE: 124 MMHG | HEIGHT: 69 IN | DIASTOLIC BLOOD PRESSURE: 80 MMHG | HEART RATE: 68 BPM | BODY MASS INDEX: 41.77 KG/M2

## 2017-04-19 DIAGNOSIS — I48.91 ATRIAL FIBRILLATION, UNSPECIFIED TYPE (HCC): ICD-10-CM

## 2017-04-19 LAB — EKG IMPRESSION: NORMAL

## 2017-04-19 PROCEDURE — 99214 OFFICE O/P EST MOD 30 MIN: CPT | Mod: 25 | Performed by: INTERNAL MEDICINE

## 2017-04-19 PROCEDURE — 93280 PM DEVICE PROGR EVAL DUAL: CPT | Performed by: INTERNAL MEDICINE

## 2017-04-19 PROCEDURE — 93000 ELECTROCARDIOGRAM COMPLETE: CPT | Mod: 59 | Performed by: INTERNAL MEDICINE

## 2017-04-19 RX ORDER — DOFETILIDE 0.12 MG/1
125 CAPSULE ORAL EVERY 12 HOURS
Qty: 30 CAP | Refills: 2 | Status: SHIPPED | OUTPATIENT
Start: 2017-04-19 | End: 2017-06-10 | Stop reason: SDUPTHER

## 2017-04-19 ASSESSMENT — ENCOUNTER SYMPTOMS: PALPITATIONS: 0

## 2017-04-19 NOTE — PROGRESS NOTES
Subjective:   Juan Martel is a 57 y.o. male who presents today for follow up of a fib and ppm    No a fib in device.    No change in weight.  Working out but not good on diet.      Past Medical History   Diagnosis Date   • Gout    • A-fib (CMS-HCC)    • At risk for sleep apnea    • Cardiac pacemaker in situ 12/20/2016   • Sleep apnea      CPAP   • Pneumonia 2000   • Cardiac asystole (CMS-HCC) 12/20/16     possible vaso-vagal     Past Surgical History   Procedure Laterality Date   • Other orthopedic surgery  6/1/1990     achilles tendon repair left foot   • Other cardiac surgery  12/20/16     pacemaker     Family History   Problem Relation Age of Onset   • Other Mother    • Heart Disease Mother      atrial fib   • Stroke Father 65     CVa   • Diabetes Father    • Stroke Paternal Grandmother    • Cancer Sister      History   Smoking status   • Never Smoker    Smokeless tobacco   • Never Used     Allergies   Allergen Reactions   • Bloodless    • Codeine Vomiting   • Tramadol Vomiting and Nausea     Outpatient Encounter Prescriptions as of 4/19/2017   Medication Sig Dispense Refill   • metoprolol SR (TOPROL XL) 25 MG TABLET SR 24 HR TAKE 1 TABLET BY MOUTH EVERY DAY 30 Tab 6   • dofetilide (TIKOSYN) 250 MCG Cap Take 2 Caps by mouth every 12 hours. 60 Cap 2   • magnesium chloride SR (SLO-MAG) 535 (64 MG) MG Tab CR Take 1 Tab by mouth 2 Times a Day. 60 Tab 0   • rivaroxaban (XARELTO) 20 MG Tab tablet Take 1 Tab by mouth with dinner. 30 Tab 11   • allopurinol (ZYLOPRIM) 300 MG Tab Take 300 mg by mouth every evening.     • dofetilide (TIKOSYN) 500 MCG Cap TAKE 1 CAPSULE BY MOUTH EVERY 12 HOURS (Patient not taking: Reported on 4/19/2017) 60 Cap 6   • omeprazole (PRILOSEC) 20 MG delayed-release capsule Take 1 Cap by mouth every morning before breakfast. (Patient not taking: Reported on 4/19/2017) 30 Cap 0   • sucralfate (CARAFATE) 1 GM Tab Take 1 Tab by mouth 4 Times a Day,Before Meals and at Bedtime. (Patient not taking:  "Reported on 3/22/2017) 120 Tab 0   • tramadol (ULTRAM) 50 MG Tab Take 1 Tab by mouth every 8 hours as needed for Moderate Pain. (Patient not taking: Reported on 3/22/2017) 20 Tab 0     No facility-administered encounter medications on file as of 4/19/2017.     Review of Systems   Cardiovascular: Negative for palpitations.        Objective:   /80 mmHg  Pulse 68  Ht 1.753 m (5' 9.02\")  Wt 127.914 kg (282 lb)  BMI 41.63 kg/m2  SpO2 95%    Physical Exam   Constitutional: He is oriented to person, place, and time. He appears well-developed and well-nourished. No distress.   HENT:   Head: Normocephalic and atraumatic.   Right Ear: External ear normal.   Left Ear: External ear normal.   Nose: Nose normal.   Mouth/Throat: Oropharynx is clear and moist.   Eyes: Conjunctivae and EOM are normal. Pupils are equal, round, and reactive to light. Right eye exhibits no discharge. Left eye exhibits no discharge. No scleral icterus.   Neck: Normal range of motion. Neck supple. No JVD present. No tracheal deviation present.   Cardiovascular: Normal rate, regular rhythm, normal heart sounds and intact distal pulses.  Exam reveals no gallop and no friction rub.    No murmur heard.  Ppm pocket well healed   Pulmonary/Chest: Effort normal and breath sounds normal. No stridor. No respiratory distress. He has no wheezes. He has no rales. He exhibits no tenderness.   Abdominal: Soft. He exhibits no distension. There is no tenderness.   Musculoskeletal: He exhibits no edema or tenderness.   Neurological: He is alert and oriented to person, place, and time. No cranial nerve deficit. Coordination normal.   Skin: Skin is warm and dry. No rash noted. He is not diaphoretic. No erythema. No pallor.   Psychiatric: He has a normal mood and affect. His behavior is normal. Judgment and thought content normal.   Vitals reviewed.      Assessment:     1. Atrial fibrillation, unspecified type (CMS-HCC)  EKG       Medical Decision Making:  " Today's Assessment / Status / Plan:   A fib- doing very well.  Will try to stop the tikosyn- cutting to 125  Wants to stop oac and go to daily asa  20 min counselling and exercise and diet

## 2017-04-19 NOTE — MR AVS SNAPSHOT
"        Juan Delonte   2017 2:00 PM   Office Visit   MRN: [id number]    Department:  [facility]   Dept Phone:  [phone]    Description:  Male : 1960   Provider:  Darion Raza M.D.           Reason for Visit     Follow-Up           Allergies as of 2017     Allergen Noted Reactions    Bloodless 2016       Codeine 2016   Vomiting    Tramadol 2017   Vomiting, Nausea      You were diagnosed with     Atrial fibrillation, unspecified type (CMS-HCC)   [9033995]         Vital Signs     Blood Pressure Pulse Height Weight Body Mass Index Oxygen Saturation    124/80 mmHg 68 1.753 m (5' 9.02\") 127.914 kg (282 lb) 41.63 kg/m2 95%    Smoking Status                   Never Smoker            Basic Information     Date Of Birth Sex Race Ethnicity Preferred Language    1960 Male White  Origin (South Korean,Greek,Malian,Larry, etc) English      Your appointments     2017 12:40 PM   FOLLOW UP with Darion Raza M.D.   [facility] (--)    [address], [address]  [facility] [address]   [phone]              Problem List              ICD-10-CM Priority Class Noted - Resolved    Near syncope R55   2016 - Present    Nausea and vomiting R11.2   2016 - Present    Chest pain R07.9   2016 - Present    Atrial fibrillation (CMS-HCC) I48.91   2016 - Present    Chronic anticoagulation Z79.01   2016 - Present    Snoring R06.83   2016 - Present    ARCHANA (obstructive sleep apnea) G47.33   6/15/2016 - Present    High risk medication use Z79.899   2016 - Present    AF (atrial fibrillation) (CMS-HCC) I48.91   2016 - Present    Cardiac pacemaker in situ Z95.0   2016 - Present    S/P ablation of atrial fibrillation Z98.890, Z86.79   2017 - Present      Health Maintenance        Date Due Completion Dates    IMM DTaP/Tdap/Td Vaccine (1 - Tdap) 3/14/1977 ---    COLONOSCOPY 3/14/2008 ---            "   Results       Current Immunizations     No immunizations on file.      Below and/or attached are the medications your provider expects you to take. Review all of your home medications and newly ordered medications with your provider and/or pharmacist. Follow medication instructions as directed by your provider and/or pharmacist. Please keep your medication list with you and share with your provider. Update the information when medications are discontinued, doses are changed, or new medications (including over-the-counter products) are added; and carry medication information at all times in the event of emergency situations     Allergies:  BLOODLESS - (reactions not documented)     CODEINE - Vomiting     TRAMADOL - Vomiting,Nausea               Medications  Valid as of: April 19, 2017 -  2:42 PM    Generic Name Brand Name Tablet Size Instructions for use    Allopurinol (Tab) ZYLOPRIM 300 MG Take 300 mg by mouth every evening.        Dofetilide (Cap) TIKOSYN 500 MCG TAKE 1 CAPSULE BY MOUTH EVERY 12 HOURS        Dofetilide (Cap) TIKOSYN 125 MCG Take 1 Cap by mouth every 12 hours.        Magnesium Chloride (Tab CR) SLO- (64 MG) MG Take 1 Tab by mouth 2 Times a Day.        Metoprolol Succinate (TABLET SR 24 HR) TOPROL XL 25 MG TAKE 1 TABLET BY MOUTH EVERY DAY        Omeprazole (CAPSULE DELAYED RELEASE) PRILOSEC 20 MG Take 1 Cap by mouth every morning before breakfast.        Sucralfate (Tab) CARAFATE 1 GM Take 1 Tab by mouth 4 Times a Day,Before Meals and at Bedtime.        TraMADol HCl (Tab) ULTRAM 50 MG Take 1 Tab by mouth every 8 hours as needed for Moderate Pain.        .                 Medicines prescribed today were sent to:     [id number]  [place], [place] - [address] [address]    Phone: [phone] Fax: [phone]    Open 24 Hours?: No      Medication refill instructions:       If your prescription bottle indicates you have medication refills left, it is not necessary  to call your provider’s office. Please contact your pharmacy and they will refill your medication.    If your prescription bottle indicates you do not have any refills left, you may request refills at any time through one of the following ways: The online [employer] system (except Urgent Care), by calling your provider’s office, or by asking your pharmacy to contact your provider’s office with a refill request. Medication refills are processed only during regular business hours and may not be available until the next business day. Your provider may request additional information or to have a follow-up visit with you prior to refilling your medication.   *Please Note: Medication refills are assigned a new Rx number when refilled electronically. Your pharmacy may indicate that no refills were authorized even though a new prescription for the same medication is available at the pharmacy. Please request the medicine by name with the pharmacy before contacting your provider for a refill.           MyChart Status: Patient Declined

## 2017-05-30 ENCOUNTER — HOSPITAL ENCOUNTER (EMERGENCY)
Facility: MEDICAL CENTER | Age: 57
End: 2017-05-30
Attending: EMERGENCY MEDICINE
Payer: COMMERCIAL

## 2017-05-30 ENCOUNTER — APPOINTMENT (OUTPATIENT)
Dept: RADIOLOGY | Facility: MEDICAL CENTER | Age: 57
End: 2017-05-30
Attending: EMERGENCY MEDICINE
Payer: COMMERCIAL

## 2017-05-30 VITALS
HEIGHT: 69 IN | BODY MASS INDEX: 40.73 KG/M2 | OXYGEN SATURATION: 97 % | RESPIRATION RATE: 20 BRPM | WEIGHT: 275 LBS | HEART RATE: 68 BPM | TEMPERATURE: 96.8 F | DIASTOLIC BLOOD PRESSURE: 64 MMHG | SYSTOLIC BLOOD PRESSURE: 140 MMHG

## 2017-05-30 DIAGNOSIS — R11.2 NON-INTRACTABLE VOMITING WITH NAUSEA, UNSPECIFIED VOMITING TYPE: ICD-10-CM

## 2017-05-30 DIAGNOSIS — N23 RENAL COLIC: ICD-10-CM

## 2017-05-30 DIAGNOSIS — F41.8 SITUATIONAL ANXIETY: ICD-10-CM

## 2017-05-30 LAB
ALBUMIN SERPL BCP-MCNC: 4.5 G/DL (ref 3.2–4.9)
ALBUMIN/GLOB SERPL: 1.5 G/DL
ALP SERPL-CCNC: 91 U/L (ref 30–99)
ALT SERPL-CCNC: 49 U/L (ref 2–50)
ANION GAP SERPL CALC-SCNC: 10 MMOL/L (ref 0–11.9)
APPEARANCE UR: ABNORMAL
APPEARANCE UR: ABNORMAL
AST SERPL-CCNC: 35 U/L (ref 12–45)
BACTERIA #/AREA URNS HPF: ABNORMAL /HPF
BASOPHILS # BLD AUTO: 0.3 % (ref 0–1.8)
BASOPHILS # BLD: 0.03 K/UL (ref 0–0.12)
BILIRUB SERPL-MCNC: 0.7 MG/DL (ref 0.1–1.5)
BILIRUB UR QL STRIP.AUTO: NEGATIVE
BUN SERPL-MCNC: 18 MG/DL (ref 8–22)
CALCIUM SERPL-MCNC: 9.1 MG/DL (ref 8.4–10.2)
CHLORIDE SERPL-SCNC: 104 MMOL/L (ref 96–112)
CO2 SERPL-SCNC: 23 MMOL/L (ref 20–33)
COLOR UR: ABNORMAL
COLOR UR: YELLOW
CREAT SERPL-MCNC: 1.39 MG/DL (ref 0.5–1.4)
CULTURE IF INDICATED INDCX: YES UA CULTURE
EOSINOPHIL # BLD AUTO: 0.04 K/UL (ref 0–0.51)
EOSINOPHIL NFR BLD: 0.4 % (ref 0–6.9)
EPI CELLS #/AREA URNS HPF: ABNORMAL /HPF
ERYTHROCYTE [DISTWIDTH] IN BLOOD BY AUTOMATED COUNT: 42.5 FL (ref 35.9–50)
GFR SERPL CREATININE-BSD FRML MDRD: 52 ML/MIN/1.73 M 2
GLOBULIN SER CALC-MCNC: 3 G/DL (ref 1.9–3.5)
GLUCOSE SERPL-MCNC: 126 MG/DL (ref 65–99)
GLUCOSE UR STRIP.AUTO-MCNC: NEGATIVE MG/DL
GLUCOSE UR STRIP.AUTO-MCNC: NEGATIVE MG/DL
HCT VFR BLD AUTO: 44.2 % (ref 42–52)
HGB BLD-MCNC: 15.5 G/DL (ref 14–18)
IMM GRANULOCYTES # BLD AUTO: 0.06 K/UL (ref 0–0.11)
IMM GRANULOCYTES NFR BLD AUTO: 0.5 % (ref 0–0.9)
KETONES UR STRIP.AUTO-MCNC: ABNORMAL MG/DL
KETONES UR STRIP.AUTO-MCNC: NEGATIVE MG/DL
LEUKOCYTE ESTERASE UR QL STRIP.AUTO: NEGATIVE
LEUKOCYTE ESTERASE UR QL STRIP.AUTO: NEGATIVE
LIPASE SERPL-CCNC: 24 U/L (ref 7–58)
LYMPHOCYTES # BLD AUTO: 1.08 K/UL (ref 1–4.8)
LYMPHOCYTES NFR BLD: 9.5 % (ref 22–41)
MCH RBC QN AUTO: 31.4 PG (ref 27–33)
MCHC RBC AUTO-ENTMCNC: 35.1 G/DL (ref 33.7–35.3)
MCV RBC AUTO: 89.5 FL (ref 81.4–97.8)
MICRO URNS: ABNORMAL
MONOCYTES # BLD AUTO: 0.82 K/UL (ref 0–0.85)
MONOCYTES NFR BLD AUTO: 7.2 % (ref 0–13.4)
MUCOUS THREADS #/AREA URNS HPF: ABNORMAL /HPF
NEUTROPHILS # BLD AUTO: 9.32 K/UL (ref 1.82–7.42)
NEUTROPHILS NFR BLD: 82.1 % (ref 44–72)
NITRITE UR QL STRIP.AUTO: NEGATIVE
NITRITE UR QL STRIP.AUTO: NEGATIVE
NRBC # BLD AUTO: 0 K/UL
NRBC BLD AUTO-RTO: 0 /100 WBC
PH UR STRIP.AUTO: 6 [PH]
PH UR STRIP.AUTO: 6 [PH]
PLATELET # BLD AUTO: 209 K/UL (ref 164–446)
PMV BLD AUTO: 11.4 FL (ref 9–12.9)
POTASSIUM SERPL-SCNC: 3.7 MMOL/L (ref 3.6–5.5)
PROT SERPL-MCNC: 7.5 G/DL (ref 6–8.2)
PROT UR QL STRIP: NEGATIVE MG/DL
PROT UR QL STRIP: NEGATIVE MG/DL
RBC # BLD AUTO: 4.94 M/UL (ref 4.7–6.1)
RBC # URNS HPF: ABNORMAL /HPF
RBC UR QL AUTO: ABNORMAL
RBC UR QL AUTO: ABNORMAL
SODIUM SERPL-SCNC: 137 MMOL/L (ref 135–145)
SP GR UR STRIP.AUTO: 1.02
SP GR UR STRIP.AUTO: 1.02
WBC # BLD AUTO: 11.4 K/UL (ref 4.8–10.8)
WBC #/AREA URNS HPF: ABNORMAL /HPF

## 2017-05-30 PROCEDURE — A9270 NON-COVERED ITEM OR SERVICE: HCPCS

## 2017-05-30 PROCEDURE — 99285 EMERGENCY DEPT VISIT HI MDM: CPT

## 2017-05-30 PROCEDURE — 87086 URINE CULTURE/COLONY COUNT: CPT

## 2017-05-30 PROCEDURE — 700105 HCHG RX REV CODE 258: Performed by: EMERGENCY MEDICINE

## 2017-05-30 PROCEDURE — 700111 HCHG RX REV CODE 636 W/ 250 OVERRIDE (IP): Performed by: EMERGENCY MEDICINE

## 2017-05-30 PROCEDURE — 700111 HCHG RX REV CODE 636 W/ 250 OVERRIDE (IP)

## 2017-05-30 PROCEDURE — 96374 THER/PROPH/DIAG INJ IV PUSH: CPT

## 2017-05-30 PROCEDURE — 96361 HYDRATE IV INFUSION ADD-ON: CPT

## 2017-05-30 PROCEDURE — 83690 ASSAY OF LIPASE: CPT

## 2017-05-30 PROCEDURE — 96376 TX/PRO/DX INJ SAME DRUG ADON: CPT

## 2017-05-30 PROCEDURE — 74176 CT ABD & PELVIS W/O CONTRAST: CPT

## 2017-05-30 PROCEDURE — 96375 TX/PRO/DX INJ NEW DRUG ADDON: CPT

## 2017-05-30 PROCEDURE — 36415 COLL VENOUS BLD VENIPUNCTURE: CPT

## 2017-05-30 PROCEDURE — 700102 HCHG RX REV CODE 250 W/ 637 OVERRIDE(OP)

## 2017-05-30 PROCEDURE — 81001 URINALYSIS AUTO W/SCOPE: CPT

## 2017-05-30 PROCEDURE — 85025 COMPLETE CBC W/AUTO DIFF WBC: CPT

## 2017-05-30 PROCEDURE — 81002 URINALYSIS NONAUTO W/O SCOPE: CPT

## 2017-05-30 PROCEDURE — 93005 ELECTROCARDIOGRAM TRACING: CPT | Performed by: EMERGENCY MEDICINE

## 2017-05-30 PROCEDURE — 80053 COMPREHEN METABOLIC PANEL: CPT

## 2017-05-30 RX ORDER — ONDANSETRON 2 MG/ML
4 INJECTION INTRAMUSCULAR; INTRAVENOUS ONCE
Status: COMPLETED | OUTPATIENT
Start: 2017-05-30 | End: 2017-05-30

## 2017-05-30 RX ORDER — ASPIRIN 325 MG
325 TABLET ORAL DAILY
Status: SHIPPED | COMMUNITY
End: 2017-11-27

## 2017-05-30 RX ORDER — MORPHINE SULFATE 4 MG/ML
4 INJECTION, SOLUTION INTRAMUSCULAR; INTRAVENOUS ONCE
Status: COMPLETED | OUTPATIENT
Start: 2017-05-30 | End: 2017-05-30

## 2017-05-30 RX ORDER — MULTIVITAMIN WITH IRON
250 TABLET ORAL 2 TIMES DAILY
Status: SHIPPED | COMMUNITY
End: 2017-11-27

## 2017-05-30 RX ORDER — PROMETHAZINE HYDROCHLORIDE 25 MG/1
25 SUPPOSITORY RECTAL ONCE
Status: DISCONTINUED | OUTPATIENT
Start: 2017-05-30 | End: 2017-05-30 | Stop reason: HOSPADM

## 2017-05-30 RX ORDER — ONDANSETRON 2 MG/ML
4 INJECTION INTRAMUSCULAR; INTRAVENOUS ONCE
Status: DISCONTINUED | OUTPATIENT
Start: 2017-05-30 | End: 2017-05-30 | Stop reason: HOSPADM

## 2017-05-30 RX ORDER — ONDANSETRON 4 MG/1
4 TABLET, ORALLY DISINTEGRATING ORAL EVERY 8 HOURS PRN
Qty: 10 TAB | Refills: 0 | Status: SHIPPED | OUTPATIENT
Start: 2017-05-30 | End: 2017-05-30

## 2017-05-30 RX ORDER — ONDANSETRON 4 MG/1
4 TABLET, ORALLY DISINTEGRATING ORAL EVERY 8 HOURS PRN
Qty: 10 TAB | Refills: 0 | Status: SHIPPED | OUTPATIENT
Start: 2017-05-30 | End: 2017-11-27

## 2017-05-30 RX ORDER — PROMETHAZINE HYDROCHLORIDE 25 MG/ML
12.5 INJECTION, SOLUTION INTRAMUSCULAR; INTRAVENOUS ONCE
Status: DISCONTINUED | OUTPATIENT
Start: 2017-05-30 | End: 2017-05-30

## 2017-05-30 RX ORDER — SODIUM CHLORIDE 9 MG/ML
1000 INJECTION, SOLUTION INTRAVENOUS ONCE
Status: COMPLETED | OUTPATIENT
Start: 2017-05-30 | End: 2017-05-30

## 2017-05-30 RX ORDER — KETOROLAC TROMETHAMINE 30 MG/ML
30 INJECTION, SOLUTION INTRAMUSCULAR; INTRAVENOUS ONCE
Status: COMPLETED | OUTPATIENT
Start: 2017-05-30 | End: 2017-05-30

## 2017-05-30 RX ORDER — PROMETHAZINE HYDROCHLORIDE 25 MG/1
TABLET ORAL
Status: COMPLETED
Start: 2017-05-30 | End: 2017-05-30

## 2017-05-30 RX ORDER — HYDROCODONE BITARTRATE AND ACETAMINOPHEN 5; 325 MG/1; MG/1
1-2 TABLET ORAL EVERY 6 HOURS PRN
Qty: 20 TAB | Refills: 0 | Status: SHIPPED | OUTPATIENT
Start: 2017-05-30 | End: 2017-11-27

## 2017-05-30 RX ORDER — PROMETHAZINE HYDROCHLORIDE 25 MG/1
25 TABLET ORAL ONCE
Status: COMPLETED | OUTPATIENT
Start: 2017-05-30 | End: 2017-05-30

## 2017-05-30 RX ORDER — ONDANSETRON 2 MG/ML
INJECTION INTRAMUSCULAR; INTRAVENOUS
Status: COMPLETED
Start: 2017-05-30 | End: 2017-05-30

## 2017-05-30 RX ORDER — TAMSULOSIN HYDROCHLORIDE 0.4 MG/1
0.4 CAPSULE ORAL
Qty: 7 CAP | Refills: 0 | Status: SHIPPED | OUTPATIENT
Start: 2017-05-30 | End: 2017-06-06

## 2017-05-30 RX ADMIN — MORPHINE SULFATE 4 MG: 4 INJECTION INTRAVENOUS at 14:42

## 2017-05-30 RX ADMIN — ONDANSETRON 4 MG: 2 INJECTION INTRAMUSCULAR; INTRAVENOUS at 14:42

## 2017-05-30 RX ADMIN — KETOROLAC TROMETHAMINE 30 MG: 30 INJECTION, SOLUTION INTRAMUSCULAR at 14:41

## 2017-05-30 RX ADMIN — SODIUM CHLORIDE 1000 ML: 9 INJECTION, SOLUTION INTRAVENOUS at 18:26

## 2017-05-30 RX ADMIN — PROMETHAZINE HYDROCHLORIDE 25 MG: 25 TABLET ORAL at 16:55

## 2017-05-30 RX ADMIN — SODIUM CHLORIDE 1000 ML: 9 INJECTION, SOLUTION INTRAVENOUS at 14:41

## 2017-05-30 RX ADMIN — ONDANSETRON 4 MG: 2 INJECTION INTRAMUSCULAR; INTRAVENOUS at 15:48

## 2017-05-30 ASSESSMENT — PAIN SCALES - GENERAL
PAINLEVEL_OUTOF10: 9
PAINLEVEL_OUTOF10: 3

## 2017-05-30 NOTE — ED NOTES
The Medication Reconciliation process has been completed by interviewing the patient    Allergies have been reviewed  Antibiotic use in 30 days - None outpatient    Home Pharmacy:  [address]

## 2017-05-30 NOTE — ED NOTES
"Chief Complaint   Patient presents with   • Flank Pain     c/o left flank pain started this am.       /93 mmHg  Pulse 68  Temp(Src) 36 °C (96.8 °F)  Resp 22  Ht 1.753 m (5' 9\")  SpO2 100%    "

## 2017-05-30 NOTE — DISCHARGE INSTRUCTIONS
Take medicines as prescribed.  Drink plenty of fluids.  He may also take ibuprofen 400 mg every 6 hours.  No driving, and Norco.  Follow up with urologist.  Return to the ER for pain, nausea, vomiting, fever or other concerns.  Strain your urine.  He careful with taking Flomax, it  may make you dizzy, especially in the morning.      Kidney Stones  Kidney stones (urolithiasis) are deposits that form inside your kidneys. The intense pain is caused by the stone moving through the urinary tract. When the stone moves, the ureter goes into spasm around the stone. The stone is usually passed in the urine.   CAUSES   · A disorder that makes certain neck glands produce too much parathyroid hormone (primary hyperparathyroidism).  · A buildup of uric acid crystals, similar to gout in your joints.  · Narrowing (stricture) of the ureter.  · A kidney obstruction present at birth (congenital obstruction).  · Previous surgery on the kidney or ureters.  · Numerous kidney infections.  SYMPTOMS   · Feeling sick to your stomach (nauseous).  · Throwing up (vomiting).  · Blood in the urine (hematuria).  · Pain that usually spreads (radiates) to the groin.  · Frequency or urgency of urination.  DIAGNOSIS   · Taking a history and physical exam.  · Blood or urine tests.  · CT scan.  · Occasionally, an examination of the inside of the urinary bladder (cystoscopy) is performed.  TREATMENT   · Observation.  · Increasing your fluid intake.  · Extracorporeal shock wave lithotripsy--This is a noninvasive procedure that uses shock waves to break up kidney stones.  · Surgery may be needed if you have severe pain or persistent obstruction. There are various surgical procedures. Most of the procedures are performed with the use of small instruments. Only small incisions are needed to accommodate these instruments, so recovery time is minimized.  The size, location, and chemical composition are all important variables that will determine the proper  choice of action for you. Talk to your health care provider to better understand your situation so that you will minimize the risk of injury to yourself and your kidney.   HOME CARE INSTRUCTIONS   · Drink enough water and fluids to keep your urine clear or pale yellow. This will help you to pass the stone or stone fragments.  · Strain all urine through the provided strainer. Keep all particulate matter and stones for your health care provider to see. The stone causing the pain may be as small as a grain of salt. It is very important to use the strainer each and every time you pass your urine. The collection of your stone will allow your health care provider to analyze it and verify that a stone has actually passed. The stone analysis will often identify what you can do to reduce the incidence of recurrences.  · Only take over-the-counter or prescription medicines for pain, discomfort, or fever as directed by your health care provider.  · Make a follow-up appointment with your health care provider as directed.  · Get follow-up X-rays if required. The absence of pain does not always mean that the stone has passed. It may have only stopped moving. If the urine remains completely obstructed, it can cause loss of kidney function or even complete destruction of the kidney. It is your responsibility to make sure X-rays and follow-ups are completed. Ultrasounds of the kidney can show blockages and the status of the kidney. Ultrasounds are not associated with any radiation and can be performed easily in a matter of minutes.  SEEK MEDICAL CARE IF:  · You experience pain that is progressive and unresponsive to any pain medicine you have been prescribed.  SEEK IMMEDIATE MEDICAL CARE IF:   · Pain cannot be controlled with the prescribed medicine.  · You have a fever or shaking chills.  · The severity or intensity of pain increases over 18 hours and is not relieved by pain medicine.  · You develop a new onset of abdominal  pain.  · You feel faint or pass out.  · You are unable to urinate.  MAKE SURE YOU:   · Understand these instructions.  · Will watch your condition.  · Will get help right away if you are not doing well or get worse.     This information is not intended to replace advice given to you by your health care provider. Make sure you discuss any questions you have with your health care provider.     Document Released: 12/18/2006 Document Revised: 01/08/2016 Document Reviewed: 05/21/2014  [employer] Interactive Patient Education ©2016 [employer]

## 2017-05-30 NOTE — ED PROVIDER NOTES
ED Provider Note    CHIEF COMPLAINT  Chief Complaint   Patient presents with   • Flank Pain     c/o left flank pain started this am.         HPI  Juan Martel is a 57 y.o. male who presents to the ER.  Clinically of left flank pain.  Left flank pain started this morning was of fairly abrupt and intense.  Pain radiates towards the left lower quadrant of his abdomen.  It is severe in nature but occasionally it is colicky and it worsens.  Stenosis.  He nausea, vomiting or diarrhea.  The patient has a sensation of urinary retention, although he last urinated normally this morning.  He has no dysuria, hematuria or increased urinary frequency.  Denies any fevers or chills.  No constipation.  No history of kidney stones or AAA.  Denies any specific aggravating or alleviating factors.  Denies any other associated complaints.  No prior similar episodes.  No recent trauma.  He was on blood thinners until recently.    REVIEW OF SYSTEMS  See HPI for further details. All other systems are negative.    PAST MEDICAL HISTORY  Past Medical History   Diagnosis Date   • Gout    • A-fib (CMS-HCC)    • At risk for sleep apnea    • Cardiac pacemaker in situ 12/20/2016   • Sleep apnea      CPAP   • Pneumonia 2000   • Cardiac asystole (CMS-HCC) 12/20/16     possible vaso-vagal       FAMILY HISTORY  Family History   Problem Relation Age of Onset   • Other Mother    • Heart Disease Mother      atrial fib   • Stroke Father 65     CVa   • Diabetes Father    • Stroke Paternal Grandmother    • Cancer Sister        SOCIAL HISTORY  Social History     Social History   • Marital Status:      Spouse Name: N/A   • Number of Children: N/A   • Years of Education: N/A     Social History Main Topics   • Smoking status: Never Smoker    • Smokeless tobacco: Never Used   • Alcohol Use: No      Comment: occasional   • Drug Use: No   • Sexual Activity: Not Asked     Other Topics Concern   • None     Social History Narrative       SURGICAL  "HISTORY  Past Surgical History   Procedure Laterality Date   • Other orthopedic surgery  6/1/1990     achilles tendon repair left foot   • Other cardiac surgery  12/20/16     pacemaker       CURRENT MEDICATIONS  Home Medications     **Home medications have not yet been reviewed for this encounter**          ALLERGIES  Allergies   Allergen Reactions   • Bloodless    • Codeine Vomiting   • Tramadol Vomiting and Nausea       PHYSICAL EXAM  VITAL SIGNS: /93 mmHg  Pulse 68  Temp(Src) 36 °C (96.8 °F)  Resp 22  Ht 1.753 m (5' 9\")  SpO2 100%     Constitutional: Well developed, Well nourished, No acute distress, Non-toxic appearance.   HENT: Normocephalic, Atraumatic, Bilateral external ears normal, Oropharynx moist, No oral exudates, Nose normal.   Eyes: PERRL, EOMI, Conjunctiva normal, No discharge.   Neck: Normal range of motion, No tenderness, Supple, No stridor.     Cardiovascular: Normal heart rate, Normal rhythm, No murmurs, No rubs, No gallops.   Thorax & Lungs: Normal breath sounds, No respiratory distress, No wheezing,   Abdomen: Bowel sounds normal, Soft, minimal tenderness in the left lower quadrant  Skin: Warm, Dry, No erythema, No rash.   Back: No tenderness, left-sided CVA tenderness  Musculoskeletal: Good range of motion in all major joints. No tenderness to palpation or major deformities noted.  Good pulses in all 4 extremities  Neurologic: Alert & oriented x 3, , No focal deficits noted.   Psychiatric: Affect normal,      RADIOLOGY/PROCEDURES  CT-RENAL COLIC EVALUATION(A/P W/O)   Final Result      1.  Obstructing 3 mm calculus in the distal left ureter resulting in mild hydroureteronephrosis and perinephric stranding.      2.  Diverticulosis.      3.  Atherosclerosis.      4.  Hepatic steatosis.            COURSE & MEDICAL DECISION MAKING  Pertinent Labs & Imaging studies reviewed. (See chart for details)  History presents to the ER with flank pain, left sided pattern strongly suggestive of renal " colic.  Broad differential diagnosis was considered including but not limited to kidney stone, pyelonephritis, diverticulitis, and AAA.    Started on fluids, as he is in severe pain is given some morphine, Toradol and Zofran.    CT shows a renal stone.  Urinalysis does not show signs of infection.  His mild leukocytosis with epigastric pain response.  He is not febrile.  He has a slight bump in his creatinine.  This may be related to his renal stone, decreased oral intake, or increased workout in the sun over the weekend, he describes while working on a deck.    The patient was up for discharge and started having more more nausea.  Given a by mouth challenge, followed by vomiting.  He continued vomiting, rashes, given some additional Zofran.  He's had significant nausea, vomiting, passive narcotics.  I think this is likely a combination of morphine that we needed to do for pain control.  This seemed to understand this.  He continued to vomit and retch.  He is also given some Phenergan.  Again, he has more vomiting and retching is sitting up retching somewhat uncontrollable as was given a 3rd dose of Zofran.  This is more than 4 hours after the 1st dose.  Zofran.  I think it is safe for him, especially in light of his pacemaker.      The patient is observed here until he is better.  After several hours.  She is feeling completely better, tolerating both food and fluids well sounds remain normal.  Both the patient and the wife are comfortable going home.  The return to the ER for more vomiting, fever, pain or other concerns.  They're comfortable with the plan.    He is prescribed Norco, Flomax, and Zofran.  Advised to take ibuprofen, strain his urine, follow with urologist.      He is noted to have high blood pressure, which is chronic.  He'll see his doctor for this.  He is discharged in good condition.      Prior to the surgical narcotics prescription monitoring history is reviewed and there are no red  flags.    FINAL IMPRESSION  1. Renal colic    2. Non-intractable vomiting with nausea, unspecified vomiting type    3. Situational anxiety             Electronically signed by: Alex Judd, 5/30/2017 2:36 PM

## 2017-05-30 NOTE — ED AVS SNAPSHOT
[employer] Access Code: [email or web address]  Expires: 6/25/2017  4:16 AM    [employer]  A secure, online tool to manage your health information     [employer]’s [employer]® is a secure, online tool that connects you to your personalized health information from the privacy of your home -- day or night - making it very easy for you to manage your healthcare. Once the activation process is completed, you can even access your medical information using the [employer] drew, which is available for free in the [employer] Drew store or Google Play store.     [employer] provides the following levels of access (as shown below):   My Chart Features   [facility] Primary Care Doctor [facility]  Specialists [facility]  Urgent  Care Non-[facility]  Primary Care  Doctor   Email your healthcare team securely and privately 24/7 X X X X   Manage appointments: schedule your next appointment; view details of past/upcoming appointments X      Request prescription refills. X      View recent personal medical records, including lab and immunizations X X X X   View health record, including health history, allergies, medications X X X X   Read reports about your outpatient visits, procedures, consult and ER notes X X X X   See your discharge summary, which is a recap of your hospital and/or ER visit that includes your diagnosis, lab results, and care plan. X X       How to register for [employer]:  1. Go to  [email or web address].  2. Click on the Sign Up Now box, which takes you to the New Member Sign Up page. You will need to provide the following information:  a. Enter your [employer] Access Code exactly as it appears at the top of this page. (You will not need to use this code after you’ve completed the sign-up process. If you do not sign up before the expiration date, you must request a new code.)   b. Enter your date of birth.   c. Enter your home email address.   d. Click Submit, and follow the next screen’s instructions.  3. Create a [employer] ID. This will be your [employer]  login ID and cannot be changed, so think of one that is secure and easy to remember.  4. Create a [employer] password. You can change your password at any time.  5. Enter your Password Reset Question and Answer. This can be used at a later time if you forget your password.   6. Enter your e-mail address. This allows you to receive e-mail notifications when new information is available in [employer].  7. Click Sign Up. You can now view your health information.    For assistance activating your [employer] account, call [phone]

## 2017-05-30 NOTE — ED AVS SNAPSHOT
5/30/2017    Juan Martel  [address] [place] [address]    Dear Juan:    [facility] wants to ensure your discharge home is safe and you or your loved ones have had all of your questions answered regarding your care after you leave the hospital.    Below is a list of resources and contact information should you have any questions regarding your hospital stay, follow-up instructions, or active medical symptoms.    Questions or Concerns Regarding… Contact   Medical Questions Related to Your Discharge  (7 days a week, 8am-5pm) Contact a Nurse Care Coordinator   [phone]   Medical Questions Not Related to Your Discharge  (24 hours a day / 7 days a week)  Contact the Nurse Health Line   [phone]    Medications or Discharge Instructions Refer to your discharge packet   or contact your [facility] Primary Care Provider   [phone]   Follow-up Appointment(s) Schedule your appointment via [employer]   or contact Scheduling [phone]   Billing Review your statement via [employer]  or contact Billing [phone]   Medical Records Review your records via [employer]   or contact Medical Records [phone]     You may receive a telephone call within two days of discharge. This call is to make certain you understand your discharge instructions and have the opportunity to have any questions answered. You can also easily access your medical information, test results and upcoming appointments via the [employer] free online health management tool. You can learn more and sign up at [employer]. For assistance setting up your [employer] account, please call [phone].    Once again, we want to ensure your discharge home is safe and that you have a clear understanding of any next steps in your care. If you have any questions or concerns, please do not hesitate to contact us, we are here for you. Thank you for choosing [facility] for your healthcare needs.    Sincerely,    Your [facility] Healthcare Team

## 2017-05-30 NOTE — ED AVS SNAPSHOT
Home Care Instructions                                                                                                                Juan Martel   MRN: [id number]    Department:  [facility], Emergency Dept   Date of Visit:  5/30/2017            [facility], Emergency Dept    [address]    [place] [address]    Phone:  [phone]      You were seen by     Alex Judd M.D.      Your Diagnosis Was     Renal colic     N23       These are the medications you received during your hospitalization from 05/30/2017 1204 to 05/30/2017 1933     Date/Time Order Dose Route Action    05/30/2017 1441 NS infusion 1,000 mL 1,000 mL Intravenous New Bag    05/30/2017 1442 ondansetron (ZOFRAN) syringe/vial injection 4 mg 4 mg Intravenous Given    05/30/2017 1442 morphine (pf) 4 mg/ml injection 4 mg 4 mg Intravenous Given    05/30/2017 1441 ketorolac (TORADOL) injection 30 mg 30 mg Intravenous Given    05/30/2017 1548 ondansetron (ZOFRAN) syringe/vial injection 4 mg 4 mg Intravenous Given    05/30/2017 1655 promethazine (PHENERGAN) tablet 25 mg 25 mg Oral Given    05/30/2017 1830 ondansetron (ZOFRAN) syringe/vial injection 4 mg 4 mg Intravenous Refused    05/30/2017 1830 promethazine (PHENERGAN) suppository 25 mg 25 mg Rectal Refused    05/30/2017 1826 NS infusion 1,000 mL 1,000 mL Intravenous New Bag      Follow-up Information     1. Follow up with Deniz Maldonado M.D.. Schedule an appointment as soon as possible for a visit in 2 days.    Specialty:  Urology    Contact information    [address]  [address]  [place] [address]  [phone]        Medication Information     Review all of your home medications and newly ordered medications with your primary doctor and/or pharmacist as soon as possible. Follow medication instructions as directed by your doctor and/or pharmacist.     Please keep your complete medication list with you and share with your physician. Update the  information when medications are discontinued, doses are changed, or new medications (including over-the-counter products) are added; and carry medication information at all times in the event of emergency situations.               Medication List      START taking these medications        Instructions    Morning Afternoon Evening Bedtime    hydrocodone-acetaminophen 5-325 MG Tabs per tablet   Commonly known as:  NORCO        Take 1-2 Tabs by mouth every 6 hours as needed.   Dose:  1-2 Tab                        ondansetron 4 MG Tbdp   Commonly known as:  ZOFRAN ODT        Take 1 Tab by mouth every 8 hours as needed for Nausea/Vomiting.   Dose:  4 mg                        tamsulosin 0.4 MG capsule   Commonly known as:  FLOMAX        Take 1 Cap by mouth ONE-HALF HOUR AFTER BREAKFAST for 7 days.   Dose:  0.4 mg                          ASK your doctor about these medications        Instructions    Morning Afternoon Evening Bedtime    allopurinol 300 MG Tabs   Commonly known as:  ZYLOPRIM        Take 300 mg by mouth every evening.   Dose:  300 mg                        aspirin 325 MG Tabs   Commonly known as:  ASA        Take 325 mg by mouth every day.   Dose:  325 mg                        dofetilide 125 MCG Caps   Commonly known as:  TIKOSYN        Take 1 Cap by mouth every 12 hours.   Dose:  125 mcg                        Magnesium 250 MG Tabs        Take 250 mg by mouth 2 Times a Day.   Dose:  250 mg                        metoprolol SR 25 MG Tb24   Commonly known as:  TOPROL XL        TAKE 1 TABLET BY MOUTH EVERY DAY                             Where to Get Your Medications      You can get these medications from any pharmacy     Bring a paper prescription for each of these medications    - hydrocodone-acetaminophen 5-325 MG Tabs per tablet  - ondansetron 4 MG Tbdp  - tamsulosin 0.4 MG capsule            Procedures and tests performed during your visit     CARDIAC MONITORING    CBC WITH DIFFERENTIAL    COMP  METABOLIC PANEL    CT-RENAL COLIC EVALUATION(A/P W/O)    EKG (ER)    ESTIMATED GFR    IV SALINE LOCK    LIPASE    NURSING COMMUNICATION    O2 Protocol    POC UA    SALINE LOCK    URINALYSIS CULTURE, IF INDICATED    URINE CULTURE(NEW)    URINE MICROSCOPIC (W/UA)        Discharge Instructions       Take medicines as prescribed.  Drink plenty of fluids.  He may also take ibuprofen 400 mg every 6 hours.  No driving, and Norco.  Follow up with urologist.  Return to the ER for pain, nausea, vomiting, fever or other concerns.  Strain your urine.  He careful with taking Flomax, it  may make you dizzy, especially in the morning.      Kidney Stones  Kidney stones (urolithiasis) are deposits that form inside your kidneys. The intense pain is caused by the stone moving through the urinary tract. When the stone moves, the ureter goes into spasm around the stone. The stone is usually passed in the urine.   CAUSES   · A disorder that makes certain neck glands produce too much parathyroid hormone (primary hyperparathyroidism).  · A buildup of uric acid crystals, similar to gout in your joints.  · Narrowing (stricture) of the ureter.  · A kidney obstruction present at birth (congenital obstruction).  · Previous surgery on the kidney or ureters.  · Numerous kidney infections.  SYMPTOMS   · Feeling sick to your stomach (nauseous).  · Throwing up (vomiting).  · Blood in the urine (hematuria).  · Pain that usually spreads (radiates) to the groin.  · Frequency or urgency of urination.  DIAGNOSIS   · Taking a history and physical exam.  · Blood or urine tests.  · CT scan.  · Occasionally, an examination of the inside of the urinary bladder (cystoscopy) is performed.  TREATMENT   · Observation.  · Increasing your fluid intake.  · Extracorporeal shock wave lithotripsy--This is a noninvasive procedure that uses shock waves to break up kidney stones.  · Surgery may be needed if you have severe pain or persistent obstruction. There are various  surgical procedures. Most of the procedures are performed with the use of small instruments. Only small incisions are needed to accommodate these instruments, so recovery time is minimized.  The size, location, and chemical composition are all important variables that will determine the proper choice of action for you. Talk to your health care provider to better understand your situation so that you will minimize the risk of injury to yourself and your kidney.   HOME CARE INSTRUCTIONS   · Drink enough water and fluids to keep your urine clear or pale yellow. This will help you to pass the stone or stone fragments.  · Strain all urine through the provided strainer. Keep all particulate matter and stones for your health care provider to see. The stone causing the pain may be as small as a grain of salt. It is very important to use the strainer each and every time you pass your urine. The collection of your stone will allow your health care provider to analyze it and verify that a stone has actually passed. The stone analysis will often identify what you can do to reduce the incidence of recurrences.  · Only take over-the-counter or prescription medicines for pain, discomfort, or fever as directed by your health care provider.  · Make a follow-up appointment with your health care provider as directed.  · Get follow-up X-rays if required. The absence of pain does not always mean that the stone has passed. It may have only stopped moving. If the urine remains completely obstructed, it can cause loss of kidney function or even complete destruction of the kidney. It is your responsibility to make sure X-rays and follow-ups are completed. Ultrasounds of the kidney can show blockages and the status of the kidney. Ultrasounds are not associated with any radiation and can be performed easily in a matter of minutes.  SEEK MEDICAL CARE IF:  · You experience pain that is progressive and unresponsive to any pain medicine you have  been prescribed.  SEEK IMMEDIATE MEDICAL CARE IF:   · Pain cannot be controlled with the prescribed medicine.  · You have a fever or shaking chills.  · The severity or intensity of pain increases over 18 hours and is not relieved by pain medicine.  · You develop a new onset of abdominal pain.  · You feel faint or pass out.  · You are unable to urinate.  MAKE SURE YOU:   · Understand these instructions.  · Will watch your condition.  · Will get help right away if you are not doing well or get worse.     This information is not intended to replace advice given to you by your health care provider. Make sure you discuss any questions you have with your health care provider.     Document Released: 12/18/2006 Document Revised: 01/08/2016 Document Reviewed: 05/21/2014  [employer] Interactive Patient Education ©2016 [employer]            Patient Information     Patient Information    Following emergency treatment: all patient requiring follow-up care must return either to a private physician or a clinic if your condition worsens before you are able to obtain further medical attention, please return to the emergency room.     Billing Information    At [facility], we work to make the billing process streamlined for our patients.  Our Representatives are here to answer any questions you may have regarding your hospital bill.  If you have insurance coverage and have supplied your insurance information to us, we will submit a claim to your insurer on your behalf.  Should you have any questions regarding your bill, we can be reached online or by phone as follows:  Online: You are able pay your bills online or live chat with our representatives about any billing questions you may have. We are here to help Monday - Friday from 8:00am to 7:30pm and 9:00am - 12:00pm on Saturdays.  Please visit [email or web address]  for more information.   Phone:  [phone] or [phone]    Please note that  your emergency physician, surgeon, pathologist, radiologist, anesthesiologist, and other specialists are not employed by [facility] and will therefore bill separately for their services.  Please contact them directly for any questions concerning their bills at the numbers below:     Emergency Physician Services:  [phone]  [facility]:  [phone]  [facility]:  [phone]  [facility]:  [phone]    1. Your final bill may vary from the amount quoted upon discharge if all procedures are not complete at that time, or if your doctor has additional procedures of which we are not aware. You will receive an additional bill if you return to the Emergency Department at [facility] for suture removal regardless of the facility of which the sutures were placed.     2. Please arrange for settlement of this account at the emergency registration.    3. All self-pay accounts are due in full at the time of treatment.  If you are unable to meet this obligation then payment is expected within 4-5 days.     4. If you have had radiology studies (CT, X-ray, Ultrasound, MRI), you have received a preliminary result during your emergency department visit. Please contact the radiology department [phone] to receive a copy of your final result. Please discuss the Final result with your primary physician or with the follow up physician provided.     Crisis Hotline:  [place] Crisis Hotline:  3-285-FKXPMZJ or 1-961.411.8622  [place] Crisis Hotline:    1-929.679.8717 or 775-738-0135         ED Discharge Follow Up Questions    1. In order to provide you with very good care, we would like to follow up with a phone call in the next few days.  May we have your permission to contact you?     YES /  NO    2. What is the best phone number to call you? (       )_____-__________    3. What is the best time to call you?      Morning  /  Afternoon  /  Evening                   Patient  Signature:  ____________________________________________________________    Date:  ____________________________________________________________      Your appointments     Jul 17, 2017 12:40 PM   FOLLOW UP with Darion Raza M.D.   [facility] (--)    [address] [place] [address]  [place] [address] [phone]

## 2017-05-31 NOTE — ED NOTES
Discharge information provided. Pt verbalized understanding of discharge instructions to follow up with PCP and to return to ER if condition worsens. Pt expressed the awareness of not driving or or drive while taking narcotics. Pt ambulated out of ER in a wheel chair.  Urine strainer provided.

## 2017-06-01 LAB
BACTERIA UR CULT: NORMAL
SIGNIFICANT IND 70042: NORMAL
SITE SITE: NORMAL
SOURCE SOURCE: NORMAL

## 2017-06-02 LAB — EKG IMPRESSION: NORMAL

## 2017-06-05 ENCOUNTER — TELEPHONE (OUTPATIENT)
Dept: CARDIOLOGY | Facility: MEDICAL CENTER | Age: 57
End: 2017-06-05

## 2017-06-06 ENCOUNTER — PATIENT OUTREACH (OUTPATIENT)
Dept: HEALTH INFORMATION MANAGEMENT | Facility: OTHER | Age: 57
End: 2017-06-06

## 2017-06-06 NOTE — TELEPHONE ENCOUNTER
Called pt. Discussed. He may go to ER for kidney pain as it is getting intolerable. No cardiac issues pain/afib at this time. Advised use ER if gets chest pain but has not had so should not worry that he may get it. Will deal with Afib if it occurs.

## 2017-06-06 NOTE — PROGRESS NOTES
· 6/6/17 at 8:04 AM--Incoming VM received from pt's wife Tatiana 6/5/17 at 5:21 PM.  Placed return phone call to Tatiana, received VM, left voicemail with my contact information and instructions to return my phone call.      · 6/6/17 at 10:47 AM--Incoming VM received from pt's wife Tatiana at 10:39 AM s/p pt's ER discharge 5/30/17.  Placed phone call to patient's wife Tatiana.  Spoke with Tatiana, Tatiana states that pt has been in pain for 7 days and is unable to take PO pain meds as they make him vomit.  Tatiana states that pt was scheduled for a urology f/u, but not until 6/27.  Recommended that pt go to ER for evaluation.  Tatiana verbalizes understanding and states that she will take patient to ER.

## 2017-06-06 NOTE — TELEPHONE ENCOUNTER
----- Message from Madiha Quiles sent at 6/5/2017  3:24 PM PDT -----  Regarding: patient worried about stress on his heart  PILAR/Misty      Patient has kidney stones and in a lot of pain. He is worried about the effect all this stress and pain is having on his heart. He said he can't get in to see his urologist until the end of the month. He can be reached at [phone].

## 2017-06-10 DIAGNOSIS — I48.91 ATRIAL FIBRILLATION, UNSPECIFIED TYPE (HCC): ICD-10-CM

## 2017-06-12 ENCOUNTER — TELEPHONE (OUTPATIENT)
Dept: CARDIOLOGY | Facility: MEDICAL CENTER | Age: 57
End: 2017-06-12

## 2017-06-12 RX ORDER — DOFETILIDE 0.12 MG/1
CAPSULE ORAL
Qty: 60 CAP | Refills: 2 | Status: SHIPPED | OUTPATIENT
Start: 2017-06-12 | End: 2017-08-09

## 2017-06-12 NOTE — TELEPHONE ENCOUNTER
----- Message from Martha Martel sent at 6/12/2017 10:42 AM PDT -----  Regarding: Pt wants to know status on medication refill   PILAR/Misty    Pt is wants to know status on refill for Tikosyn as he is out of medication today. He would please like a call back and can be reached at [phone].

## 2017-06-12 NOTE — TELEPHONE ENCOUNTER
Medication refill called into pharmacy. Tikosyn 125 mcg Q12hr quantity 60 with 2 refills. Pt notified

## 2017-07-05 DIAGNOSIS — I48.91 ATRIAL FIBRILLATION, UNSPECIFIED TYPE (HCC): ICD-10-CM

## 2017-07-05 RX ORDER — METOPROLOL SUCCINATE 25 MG/1
25 TABLET, EXTENDED RELEASE ORAL
Qty: 90 TAB | Refills: 2
Start: 2017-07-05 | End: 2018-03-29 | Stop reason: SDUPTHER

## 2017-07-25 ENCOUNTER — HOSPITAL ENCOUNTER (OUTPATIENT)
Dept: RADIOLOGY | Facility: MEDICAL CENTER | Age: 57
End: 2017-07-25
Attending: UROLOGY
Payer: COMMERCIAL

## 2017-07-25 ENCOUNTER — HOSPITAL ENCOUNTER (OUTPATIENT)
Dept: LAB | Facility: MEDICAL CENTER | Age: 57
End: 2017-07-25
Attending: UROLOGY
Payer: COMMERCIAL

## 2017-07-25 DIAGNOSIS — N20.1 CALCULUS OF URETER: ICD-10-CM

## 2017-07-25 LAB
ANION GAP SERPL CALC-SCNC: 7 MMOL/L (ref 0–11.9)
BUN SERPL-MCNC: 11 MG/DL (ref 8–22)
CALCIUM SERPL-MCNC: 9.2 MG/DL (ref 8.5–10.5)
CHLORIDE SERPL-SCNC: 106 MMOL/L (ref 96–112)
CO2 SERPL-SCNC: 25 MMOL/L (ref 20–33)
CREAT SERPL-MCNC: 0.9 MG/DL (ref 0.5–1.4)
GFR SERPL CREATININE-BSD FRML MDRD: >60 ML/MIN/1.73 M 2
GLUCOSE SERPL-MCNC: 97 MG/DL (ref 65–99)
POTASSIUM SERPL-SCNC: 4.4 MMOL/L (ref 3.6–5.5)
PSA SERPL-MCNC: 0.46 NG/ML (ref 0–4)
SODIUM SERPL-SCNC: 138 MMOL/L (ref 135–145)

## 2017-07-25 PROCEDURE — 80048 BASIC METABOLIC PNL TOTAL CA: CPT

## 2017-07-25 PROCEDURE — 74000 DX-ABDOMEN-1 VIEW: CPT

## 2017-07-25 PROCEDURE — 36415 COLL VENOUS BLD VENIPUNCTURE: CPT

## 2017-07-25 PROCEDURE — 84153 ASSAY OF PSA TOTAL: CPT

## 2017-08-09 ENCOUNTER — OFFICE VISIT (OUTPATIENT)
Dept: CARDIOLOGY | Facility: MEDICAL CENTER | Age: 57
End: 2017-08-09
Payer: COMMERCIAL

## 2017-08-09 VITALS
DIASTOLIC BLOOD PRESSURE: 74 MMHG | HEIGHT: 69 IN | HEART RATE: 66 BPM | WEIGHT: 274 LBS | OXYGEN SATURATION: 94 % | BODY MASS INDEX: 40.58 KG/M2 | SYSTOLIC BLOOD PRESSURE: 124 MMHG

## 2017-08-09 DIAGNOSIS — I48.91 ATRIAL FIBRILLATION, UNSPECIFIED TYPE (HCC): ICD-10-CM

## 2017-08-09 DIAGNOSIS — Z95.0 CARDIAC PACEMAKER IN SITU: ICD-10-CM

## 2017-08-09 DIAGNOSIS — I49.5 SICK SINUS SYNDROME (HCC): ICD-10-CM

## 2017-08-09 LAB — EKG IMPRESSION: NORMAL

## 2017-08-09 PROCEDURE — 93280 PM DEVICE PROGR EVAL DUAL: CPT | Performed by: INTERNAL MEDICINE

## 2017-08-09 PROCEDURE — 99214 OFFICE O/P EST MOD 30 MIN: CPT | Mod: 25 | Performed by: INTERNAL MEDICINE

## 2017-08-09 PROCEDURE — 93000 ELECTROCARDIOGRAM COMPLETE: CPT | Mod: 59 | Performed by: INTERNAL MEDICINE

## 2017-08-09 ASSESSMENT — ENCOUNTER SYMPTOMS: PALPITATIONS: 0

## 2017-08-09 NOTE — PROGRESS NOTES
Subjective:   Juan Martel is a 57 y.o. male who presents today for follow up of a fib s/p ablation    3 hour episode of a fib.  Did not feel it.  Was in setting of kidney stones.    That episode in June is the only a fib over 1 minute.    Still going to gym    Past Medical History   Diagnosis Date   • Gout    • A-fib (CMS-HCC)    • At risk for sleep apnea    • Cardiac pacemaker in situ 12/20/2016   • Sleep apnea      CPAP   • Pneumonia 2000   • Cardiac asystole (CMS-HCC) 12/20/16     possible vaso-vagal     Past Surgical History   Procedure Laterality Date   • Other orthopedic surgery  6/1/1990     achilles tendon repair left foot   • Other cardiac surgery  12/20/16     pacemaker     Family History   Problem Relation Age of Onset   • Other Mother    • Heart Disease Mother      atrial fib   • Stroke Father 65     CVa   • Diabetes Father    • Stroke Paternal Grandmother    • Cancer Sister      History   Smoking status   • Never Smoker    Smokeless tobacco   • Never Used     Allergies   Allergen Reactions   • Bloodless    • Codeine Vomiting   • Tramadol Vomiting and Nausea     Outpatient Encounter Prescriptions as of 8/9/2017   Medication Sig Dispense Refill   • metoprolol SR (TOPROL XL) 25 MG TABLET SR 24 HR Take 1 Tab by mouth every day. 90 Tab 2   • dofetilide (TIKOSYN) 125 MCG Cap TAKE 1 CAP BY MOUTH EVERY 12 HOURS. 60 Cap 2   • Magnesium 250 MG Tab Take 250 mg by mouth 2 Times a Day.     • aspirin (ASA) 325 MG Tab Take 325 mg by mouth every day.     • hydrocodone-acetaminophen (NORCO) 5-325 MG Tab per tablet Take 1-2 Tabs by mouth every 6 hours as needed. 20 Tab 0   • ondansetron (ZOFRAN ODT) 4 MG TABLET DISPERSIBLE Take 1 Tab by mouth every 8 hours as needed for Nausea/Vomiting. 10 Tab 0   • allopurinol (ZYLOPRIM) 300 MG Tab Take 300 mg by mouth every evening.       No facility-administered encounter medications on file as of 8/9/2017.     Review of Systems   Cardiovascular: Negative for palpitations.         "Objective:   /74 mmHg  Pulse 66  Ht 1.753 m (5' 9.02\")  Wt 124.286 kg (274 lb)  BMI 40.44 kg/m2  SpO2 94%    Physical Exam   Constitutional: He is oriented to person, place, and time. He appears well-developed and well-nourished. No distress.   HENT:   Head: Normocephalic and atraumatic.   Right Ear: External ear normal.   Left Ear: External ear normal.   Nose: Nose normal.   Mouth/Throat: Oropharynx is clear and moist.   Eyes: Conjunctivae and EOM are normal. Pupils are equal, round, and reactive to light. Right eye exhibits no discharge. Left eye exhibits no discharge. No scleral icterus.   Neck: Normal range of motion. Neck supple. No JVD present. No tracheal deviation present.   Cardiovascular: Normal rate, regular rhythm, normal heart sounds and intact distal pulses.  Exam reveals no gallop and no friction rub.    No murmur heard.  Pulmonary/Chest: Effort normal and breath sounds normal. No stridor. No respiratory distress. He has no wheezes. He has no rales. He exhibits no tenderness.   Abdominal: Soft. He exhibits no distension. There is no tenderness.   Musculoskeletal: He exhibits no edema or tenderness.   Neurological: He is alert and oriented to person, place, and time. No cranial nerve deficit. Coordination normal.   Skin: Skin is warm and dry. No rash noted. He is not diaphoretic. No erythema. No pallor.   Psychiatric: He has a normal mood and affect. His behavior is normal. Judgment and thought content normal.   Vitals reviewed.      Assessment:     1. Atrial fibrillation, unspecified type (CMS-HCC)  EKG       Medical Decision Making:  Today's Assessment / Status / Plan:   A fib- will stop tikosyn and see how much a fib.  Also will get echo to see where ef is and if it is normalized.    bp well controlled  Continues asa for stroke prevention per his preference.    Ppm fucntion stable    "

## 2017-08-09 NOTE — MR AVS SNAPSHOT
"Juan Martel   2017 1:20 PM   Office Visit   MRN: [id number]    Department:  [facility]   Dept Phone:  [phone]    Description:  Male : 1960   Provider:  Darion Raza M.D.           Reason for Visit     Follow-Up           Allergies as of 2017     Allergen Noted Reactions    Bloodless 2016       Codeine 2016   Vomiting    Tramadol 2017   Vomiting, Nausea      You were diagnosed with     Atrial fibrillation, unspecified type (CMS-HCC)   [9211181]         Vital Signs     Blood Pressure Pulse Height Weight Body Mass Index Oxygen Saturation    124/74 mmHg 66 1.753 m (5' 9.02\") 124.286 kg (274 lb) 40.44 kg/m2 94%    Smoking Status                   Never Smoker            Basic Information     Date Of Birth Sex Race Ethnicity Preferred Language    1960 Male White  Origin ([place],Citizen of [place],Austrian,Norwegian, etc) English      Your appointments     Aug 23, 2017  2:15 PM   ECHO with [facility] EXAM 9   ECHOCARDIOLOGY [facility] ([facility])    [address] [facility] [place] [address]   [phone]            Nov 15, 2017  2:00 PM   FOLLOW UP with Darion Raza M.D.   [facility] (--)    [address], [address]  [facility] [id number] [phone]              Problem List              ICD-10-CM Priority Class Noted - Resolved    Near syncope R55   2016 - Present    Nausea and vomiting R11.2   2016 - Present    Chest pain R07.9   2016 - Present    Atrial fibrillation (CMS-HCC) I48.91   2016 - Present    Chronic anticoagulation Z79.01   2016 - Present    Snoring R06.83   2016 - Present    ARCHANA (obstructive sleep apnea) G47.33   6/15/2016 - Present    High risk medication use Z79.899   2016 - Present    AF (atrial fibrillation) (CMS-HCC) I48.91   2016 - Present    Cardiac pacemaker in situ Z95.0   2016 - Present    S/P ablation of atrial fibrillation Z98.890, Z86.79   2017 - Present   "      Health Maintenance        Date Due Completion Dates    IMM DTaP/Tdap/Td Vaccine (1 - Tdap) 3/14/1977 ---    COLONOSCOPY 3/14/2008 ---    IMM INFLUENZA (1) 9/1/2017 ---            Results       Current Immunizations     No immunizations on file.      Below and/or attached are the medications your provider expects you to take. Review all of your home medications and newly ordered medications with your provider and/or pharmacist. Follow medication instructions as directed by your provider and/or pharmacist. Please keep your medication list with you and share with your provider. Update the information when medications are discontinued, doses are changed, or new medications (including over-the-counter products) are added; and carry medication information at all times in the event of emergency situations     Allergies:  BLOODLESS - (reactions not documented)     CODEINE - Vomiting     TRAMADOL - Vomiting,Nausea               Medications  Valid as of: August 09, 2017 -  2:22 PM    Generic Name Brand Name Tablet Size Instructions for use    Allopurinol (Tab) ZYLOPRIM 300 MG Take 300 mg by mouth every evening.        Aspirin (Tab)  MG Take 325 mg by mouth every day.        Hydrocodone-Acetaminophen (Tab) NORCO 5-325 MG Take 1-2 Tabs by mouth every 6 hours as needed.        Magnesium (Tab) Magnesium 250 MG Take 250 mg by mouth 2 Times a Day.        Metoprolol Succinate (TABLET SR 24 HR) TOPROL XL 25 MG Take 1 Tab by mouth every day.        Ondansetron (TABLET DISPERSIBLE) ZOFRAN ODT 4 MG Take 1 Tab by mouth every 8 hours as needed for Nausea/Vomiting.        .                 Medicines prescribed today were sent to:     [id number]  [place] - [address] [place] [id number]    Phone: [phone] Fax: [phone]    Open 24 Hours?: No      Medication refill instructions:       If your prescription bottle indicates you have medication refills left, it is not necessary to call your  provider’s office. Please contact your pharmacy and they will refill your medication.    If your prescription bottle indicates you do not have any refills left, you may request refills at any time through one of the following ways: The online [employer] system (except Urgent Care), by calling your provider’s office, or by asking your pharmacy to contact your provider’s office with a refill request. Medication refills are processed only during regular business hours and may not be available until the next business day. Your provider may request additional information or to have a follow-up visit with you prior to refilling your medication.   *Please Note: Medication refills are assigned a new Rx number when refilled electronically. Your pharmacy may indicate that no refills were authorized even though a new prescription for the same medication is available at the pharmacy. Please request the medicine by name with the pharmacy before contacting your provider for a refill.        Your To Do List     Future Labs/Procedures Complete By Expires    Echocardiogram Comp w/o Cont  As directed 8/9/2018         [employer] Access Code: [email or web address]  Expires: 8/24/2017  8:48 AM    [employer]com  A secure, online tool to manage your health information     [employer]’s [employer]com® is a secure, online tool that connects you to your personalized health information from the privacy of your home -- day or night - making it very easy for you to manage your healthcare. Once the activation process is completed, you can even access your medical information using the [employer].com drew, which is available for free in the Apple Drew store or Google Play store.     [employer]com provides the following levels of access (as shown below):   My Chart Features   [facility] Doctor [facility]  Specialists [facility]  Urgent  Care Non-[facility]  Primary Care  Doctor   Email your healthcare team securely and privately 24/7 X X X    Manage appointments: schedule your next  appointment; view details of past/upcoming appointments X      Request prescription refills. X      View recent personal medical records, including lab and immunizations X X X X   View health record, including health history, allergies, medications X X X X   Read reports about your outpatient visits, procedures, consult and ER notes X X X X   See your discharge summary, which is a recap of your hospital and/or ER visit that includes your diagnosis, lab results, and care plan. X X       How to register for [employer]:  1. Go to  [email or web address].  2. Click on the Sign Up Now box, which takes you to the New Member Sign Up page. You will need to provide the following information:  a. Enter your [employer] Access Code exactly as it appears at the top of this page. (You will not need to use this code after you’ve completed the sign-up process. If you do not sign up before the expiration date, you must request a new code.)   b. Enter your date of birth.   c. Enter your home email address.   d. Click Submit, and follow the next screen’s instructions.  3. Create a [employer] ID. This will be your [employer] login ID and cannot be changed, so think of one that is secure and easy to remember.  4. Create a [employer] password. You can change your password at any time.  5. Enter your Password Reset Question and Answer. This can be used at a later time if you forget your password.   6. Enter your e-mail address. This allows you to receive e-mail notifications when new information is available in [employer].  7. Click Sign Up. You can now view your health information.    For assistance activating your [employer] account, call [phone]

## 2017-08-23 ENCOUNTER — HOSPITAL ENCOUNTER (OUTPATIENT)
Dept: CARDIOLOGY | Facility: MEDICAL CENTER | Age: 57
End: 2017-08-23
Attending: INTERNAL MEDICINE
Payer: COMMERCIAL

## 2017-08-23 DIAGNOSIS — I48.91 ATRIAL FIBRILLATION, UNSPECIFIED TYPE (HCC): ICD-10-CM

## 2017-08-23 PROCEDURE — 93306 TTE W/DOPPLER COMPLETE: CPT | Mod: 26 | Performed by: INTERNAL MEDICINE

## 2017-08-23 PROCEDURE — 93306 TTE W/DOPPLER COMPLETE: CPT

## 2017-08-24 LAB
LV EJECT FRACT  99904: 55
LV EJECT FRACT MOD 2C 99903: 50.37
LV EJECT FRACT MOD 4C 99902: 53.87
LV EJECT FRACT MOD BP 99901: 55.64

## 2017-09-01 ENCOUNTER — TELEPHONE (OUTPATIENT)
Dept: CARDIOLOGY | Facility: MEDICAL CENTER | Age: 57
End: 2017-09-01

## 2017-09-06 NOTE — TELEPHONE ENCOUNTER
Sandi Orellana R.N.   Phone Number: [phone]             PILAR/jacqueline Yen returning Carmela's call from 9/1, please call today [phone]

## 2017-11-15 ENCOUNTER — OFFICE VISIT (OUTPATIENT)
Dept: CARDIOLOGY | Facility: MEDICAL CENTER | Age: 57
End: 2017-11-15
Payer: COMMERCIAL

## 2017-11-15 VITALS
SYSTOLIC BLOOD PRESSURE: 138 MMHG | OXYGEN SATURATION: 94 % | WEIGHT: 279 LBS | BODY MASS INDEX: 41.32 KG/M2 | HEIGHT: 69 IN | DIASTOLIC BLOOD PRESSURE: 76 MMHG | HEART RATE: 72 BPM

## 2017-11-15 DIAGNOSIS — I48.0 PAF (PAROXYSMAL ATRIAL FIBRILLATION) (HCC): ICD-10-CM

## 2017-11-15 LAB — EKG IMPRESSION: NORMAL

## 2017-11-15 PROCEDURE — 93280 PM DEVICE PROGR EVAL DUAL: CPT | Performed by: INTERNAL MEDICINE

## 2017-11-15 PROCEDURE — 93000 ELECTROCARDIOGRAM COMPLETE: CPT | Mod: 59 | Performed by: INTERNAL MEDICINE

## 2017-11-15 PROCEDURE — 99213 OFFICE O/P EST LOW 20 MIN: CPT | Mod: 25 | Performed by: INTERNAL MEDICINE

## 2017-11-15 ASSESSMENT — ENCOUNTER SYMPTOMS: PALPITATIONS: 0

## 2017-11-15 NOTE — PROGRESS NOTES
"Subjective:   Juan Martel is a 57 y.o. male who presents today for follow up of a fib s/p ablation  And ppm    Ppm stable.  [place] ppm  0 a fib this check      He feels good.      Trying to diet but had trip to [place] and family visits she wieght back up    Past Medical History:   Diagnosis Date   • A-fib (CMS-HCC)    • At risk for sleep apnea    • Cardiac asystole (CMS-[facility]) 12/20/16    possible vaso-vagal   • Cardiac pacemaker in situ 12/20/2016   • Gout    • Pneumonia 2000   • Sleep apnea     CPAP     Past Surgical History:   Procedure Laterality Date   • OTHER CARDIAC SURGERY  12/20/16    pacemaker   • OTHER ORTHOPEDIC SURGERY  6/1/1990    achilles tendon repair left foot     Family History   Problem Relation Age of Onset   • Other Mother    • Heart Disease Mother      atrial fib   • Stroke Father 65     CVa   • Diabetes Father    • Cancer Sister    • Stroke Paternal Grandmother      History   Smoking Status   • Never Smoker   Smokeless Tobacco   • Never Used     Allergies   Allergen Reactions   • Bloodless    • Codeine Vomiting   • Tramadol Vomiting and Nausea     Outpatient Encounter Prescriptions as of 11/15/2017   Medication Sig Dispense Refill   • metoprolol SR (TOPROL XL) 25 MG TABLET SR 24 HR Take 1 Tab by mouth every day. 90 Tab 2   • Magnesium 250 MG Tab Take 250 mg by mouth 2 Times a Day.     • aspirin (ASA) 325 MG Tab Take 325 mg by mouth every day.     • hydrocodone-acetaminophen (NORCO) 5-325 MG Tab per tablet Take 1-2 Tabs by mouth every 6 hours as needed. 20 Tab 0   • ondansetron (ZOFRAN ODT) 4 MG TABLET DISPERSIBLE Take 1 Tab by mouth every 8 hours as needed for Nausea/Vomiting. 10 Tab 0   • allopurinol (ZYLOPRIM) 300 MG Tab Take 300 mg by mouth every evening.       No facility-administered encounter medications on file as of 11/15/2017.      Review of Systems   Cardiovascular: Negative for palpitations.        Objective:   /76   Pulse 72   Ht 1.753 m (5' 9.02\")   Wt (!) 126.6 kg (279 " lb)   SpO2 94%   BMI 41.18 kg/m²   bp 142/88 but cuff size small  Physical Exam   Constitutional: He is oriented to person, place, and time. He appears well-developed and well-nourished. No distress.   HENT:   Head: Normocephalic and atraumatic.   Right Ear: External ear normal.   Left Ear: External ear normal.   Nose: Nose normal.   Mouth/Throat: Oropharynx is clear and moist.   Eyes: Conjunctivae and EOM are normal. Pupils are equal, round, and reactive to light. Right eye exhibits no discharge. Left eye exhibits no discharge. No scleral icterus.   Neck: Normal range of motion. Neck supple. No JVD present. No tracheal deviation present.   Cardiovascular: Normal rate, regular rhythm, normal heart sounds and intact distal pulses.  Exam reveals no gallop and no friction rub.    No murmur heard.  Ppm site well healed   Pulmonary/Chest: Effort normal and breath sounds normal. No stridor. No respiratory distress. He has no wheezes. He has no rales. He exhibits no tenderness.   Abdominal: Soft. He exhibits no distension. There is no tenderness.   Musculoskeletal: He exhibits no edema or tenderness.   Neurological: He is alert and oriented to person, place, and time. No cranial nerve deficit. Coordination normal.   Skin: Skin is warm and dry. No rash noted. He is not diaphoretic. No erythema. No pallor.   Psychiatric: He has a normal mood and affect. His behavior is normal. Judgment and thought content normal.   Vitals reviewed.      Assessment:     1. PAF (paroxysmal atrial fibrillation) (CMS-[facility])  EKG       Medical Decision Making:  Today's Assessment / Status / Plan:   paf- well controlled.  He prefers to be off oac.  Trying to lose weight.    bp borderline, will increase meds if stays elevated.

## 2017-11-27 ENCOUNTER — APPOINTMENT (OUTPATIENT)
Dept: RADIOLOGY | Facility: MEDICAL CENTER | Age: 57
End: 2017-11-27
Payer: COMMERCIAL

## 2017-11-27 ENCOUNTER — HOSPITAL ENCOUNTER (EMERGENCY)
Facility: MEDICAL CENTER | Age: 57
End: 2017-11-27
Attending: EMERGENCY MEDICINE
Payer: COMMERCIAL

## 2017-11-27 ENCOUNTER — TELEPHONE (OUTPATIENT)
Dept: CARDIOLOGY | Facility: MEDICAL CENTER | Age: 57
End: 2017-11-27

## 2017-11-27 VITALS
DIASTOLIC BLOOD PRESSURE: 81 MMHG | HEART RATE: 68 BPM | HEIGHT: 69 IN | BODY MASS INDEX: 41.31 KG/M2 | SYSTOLIC BLOOD PRESSURE: 148 MMHG | OXYGEN SATURATION: 97 % | TEMPERATURE: 99.1 F | RESPIRATION RATE: 35 BRPM | WEIGHT: 278.88 LBS

## 2017-11-27 DIAGNOSIS — R07.89 CHEST WALL PAIN: ICD-10-CM

## 2017-11-27 LAB
ALBUMIN SERPL BCP-MCNC: 4.7 G/DL (ref 3.2–4.9)
ALBUMIN/GLOB SERPL: 1.6 G/DL
ALP SERPL-CCNC: 88 U/L (ref 30–99)
ALT SERPL-CCNC: 44 U/L (ref 2–50)
ANION GAP SERPL CALC-SCNC: 10 MMOL/L (ref 0–11.9)
APTT PPP: 30.9 SEC (ref 24.7–36)
AST SERPL-CCNC: 22 U/L (ref 12–45)
BASOPHILS # BLD AUTO: 0.2 % (ref 0–1.8)
BASOPHILS # BLD: 0.03 K/UL (ref 0–0.12)
BILIRUB SERPL-MCNC: 0.9 MG/DL (ref 0.1–1.5)
BNP SERPL-MCNC: 31 PG/ML (ref 0–100)
BUN SERPL-MCNC: 14 MG/DL (ref 8–22)
CALCIUM SERPL-MCNC: 9.5 MG/DL (ref 8.4–10.2)
CHLORIDE SERPL-SCNC: 101 MMOL/L (ref 96–112)
CO2 SERPL-SCNC: 26 MMOL/L (ref 20–33)
CREAT SERPL-MCNC: 0.99 MG/DL (ref 0.5–1.4)
EKG IMPRESSION: NORMAL
EOSINOPHIL # BLD AUTO: 0.05 K/UL (ref 0–0.51)
EOSINOPHIL NFR BLD: 0.4 % (ref 0–6.9)
ERYTHROCYTE [DISTWIDTH] IN BLOOD BY AUTOMATED COUNT: 41 FL (ref 35.9–50)
GFR SERPL CREATININE-BSD FRML MDRD: >60 ML/MIN/1.73 M 2
GLOBULIN SER CALC-MCNC: 2.9 G/DL (ref 1.9–3.5)
GLUCOSE SERPL-MCNC: 113 MG/DL (ref 65–99)
HCT VFR BLD AUTO: 45.6 % (ref 42–52)
HGB BLD-MCNC: 15.6 G/DL (ref 14–18)
IMM GRANULOCYTES # BLD AUTO: 0.03 K/UL (ref 0–0.11)
IMM GRANULOCYTES NFR BLD AUTO: 0.2 % (ref 0–0.9)
INR PPP: 1.04 (ref 0.87–1.13)
LIPASE SERPL-CCNC: 20 U/L (ref 7–58)
LYMPHOCYTES # BLD AUTO: 1.39 K/UL (ref 1–4.8)
LYMPHOCYTES NFR BLD: 11.2 % (ref 22–41)
MCH RBC QN AUTO: 30.8 PG (ref 27–33)
MCHC RBC AUTO-ENTMCNC: 34.2 G/DL (ref 33.7–35.3)
MCV RBC AUTO: 89.9 FL (ref 81.4–97.8)
MONOCYTES # BLD AUTO: 0.94 K/UL (ref 0–0.85)
MONOCYTES NFR BLD AUTO: 7.6 % (ref 0–13.4)
NEUTROPHILS # BLD AUTO: 9.98 K/UL (ref 1.82–7.42)
NEUTROPHILS NFR BLD: 80.4 % (ref 44–72)
NRBC # BLD AUTO: 0 K/UL
NRBC BLD AUTO-RTO: 0 /100 WBC
PLATELET # BLD AUTO: 211 K/UL (ref 164–446)
PMV BLD AUTO: 10.4 FL (ref 9–12.9)
POTASSIUM SERPL-SCNC: 4.2 MMOL/L (ref 3.6–5.5)
PROT SERPL-MCNC: 7.6 G/DL (ref 6–8.2)
PROTHROMBIN TIME: 13.4 SEC (ref 12–14.6)
RBC # BLD AUTO: 5.07 M/UL (ref 4.7–6.1)
SODIUM SERPL-SCNC: 137 MMOL/L (ref 135–145)
TROPONIN I SERPL-MCNC: <0.02 NG/ML (ref 0–0.04)
TROPONIN I SERPL-MCNC: <0.02 NG/ML (ref 0–0.04)
WBC # BLD AUTO: 12.4 K/UL (ref 4.8–10.8)

## 2017-11-27 PROCEDURE — 85610 PROTHROMBIN TIME: CPT

## 2017-11-27 PROCEDURE — 36415 COLL VENOUS BLD VENIPUNCTURE: CPT

## 2017-11-27 PROCEDURE — 84484 ASSAY OF TROPONIN QUANT: CPT

## 2017-11-27 PROCEDURE — 93005 ELECTROCARDIOGRAM TRACING: CPT | Performed by: EMERGENCY MEDICINE

## 2017-11-27 PROCEDURE — 80053 COMPREHEN METABOLIC PANEL: CPT

## 2017-11-27 PROCEDURE — 85025 COMPLETE CBC W/AUTO DIFF WBC: CPT

## 2017-11-27 PROCEDURE — 71010 DX-CHEST-LIMITED (1 VIEW): CPT

## 2017-11-27 PROCEDURE — 71010 DX-CHEST-LIMITED (1 VIEW): CPT | Performed by: EMERGENCY MEDICINE

## 2017-11-27 PROCEDURE — 93005 ELECTROCARDIOGRAM TRACING: CPT

## 2017-11-27 PROCEDURE — 83880 ASSAY OF NATRIURETIC PEPTIDE: CPT

## 2017-11-27 PROCEDURE — 99285 EMERGENCY DEPT VISIT HI MDM: CPT

## 2017-11-27 PROCEDURE — 700111 HCHG RX REV CODE 636 W/ 250 OVERRIDE (IP): Performed by: EMERGENCY MEDICINE

## 2017-11-27 PROCEDURE — 85730 THROMBOPLASTIN TIME PARTIAL: CPT

## 2017-11-27 PROCEDURE — 83690 ASSAY OF LIPASE: CPT

## 2017-11-27 PROCEDURE — 96374 THER/PROPH/DIAG INJ IV PUSH: CPT

## 2017-11-27 RX ORDER — THIAMINE HCL 100 MG
500 TABLET ORAL DAILY
COMMUNITY

## 2017-11-27 RX ORDER — KETOROLAC TROMETHAMINE 30 MG/ML
30 INJECTION, SOLUTION INTRAMUSCULAR; INTRAVENOUS ONCE
Status: COMPLETED | OUTPATIENT
Start: 2017-11-27 | End: 2017-11-27

## 2017-11-27 RX ADMIN — KETOROLAC TROMETHAMINE 30 MG: 30 INJECTION, SOLUTION INTRAMUSCULAR at 14:07

## 2017-11-27 ASSESSMENT — PAIN SCALES - GENERAL: PAINLEVEL_OUTOF10: 8

## 2017-11-27 NOTE — ED NOTES
Per wife, her  has been complaining of constant chest pain, dry cough. Sob while walking, he has a pacemaker for over a year now. EKG don on arrival. Charge notified, roomed and chest pain protocol instituted.

## 2017-11-27 NOTE — ED PROVIDER NOTES
ED Provider Note    CHIEF COMPLAINT  Chief Complaint   Patient presents with   • Chest Pain         HPI  Juan Martel is a 57 y.o. male who presents to the ED secondary to chest pain. The patient has extensive history of cardiac arrest status post ablation and pacemaker placement. He states his last stress test was approximately a year ago. The patient started developing chest pain, woke him up last night, central, pressure, nonradiating, worse with walking, no shortness of breath with some cough. He is increasing sneezing yesterday, Jie states that he has very forceful sneezes. No diabetes, hypertension, heart attacks, does not smoke, no family history. He sees Dr. Raza    REVIEW OF SYSTEMS  See HPI for further details. All other systems are negative.     PAST MEDICAL HISTORY  Past Medical History:   Diagnosis Date   • A-fib (CMS-HCC)    • At risk for sleep apnea    • Cardiac asystole (CMS-HCC) 12/20/16    possible vaso-vagal   • Cardiac pacemaker in situ 12/20/2016   • Gout    • Pneumonia 2000   • Sleep apnea     CPAP       FAMILY HISTORY  Family History   Problem Relation Age of Onset   • Other Mother    • Heart Disease Mother      atrial fib   • Stroke Father 65     CVa   • Diabetes Father    • Cancer Sister    • Stroke Paternal Grandmother        SOCIAL HISTORY  Social History     Social History   • Marital status:      Spouse name: N/A   • Number of children: N/A   • Years of education: N/A     Social History Main Topics   • Smoking status: Never Smoker   • Smokeless tobacco: Never Used   • Alcohol use No      Comment: occasional   • Drug use: No   • Sexual activity: Not on file     Other Topics Concern   • Not on file     Social History Narrative   • No narrative on file       SURGICAL HISTORY  Past Surgical History:   Procedure Laterality Date   • OTHER CARDIAC SURGERY  12/20/16    pacemaker   • OTHER ORTHOPEDIC SURGERY  6/1/1990    achilles tendon repair left foot       CURRENT  "MEDICATIONS  Home Medications     Reviewed by Gonzalez Kendall (Pharmacy Tech) on 11/27/17 at 1438  Med List Status: Complete   Medication Last Dose Status   allopurinol (ZYLOPRIM) 300 MG Tab 11/27/2017 Active   Magnesium 500 MG Tab 11/27/2017 Active   metoprolol SR (TOPROL XL) 25 MG TABLET SR 24 HR 11/27/2017 Active                ALLERGIES  Allergies   Allergen Reactions   • Bloodless    • Codeine Vomiting   • Tramadol Vomiting and Nausea       PHYSICAL EXAM  VITAL SIGNS: /81   Pulse 68   Temp 37.3 °C (99.1 °F)   Resp (!) 35   Ht 1.753 m (5' 9\")   Wt (!) 126.5 kg (278 lb 14.1 oz)   SpO2 97%   BMI 41.18 kg/m²   Constitutional: Well developed, Well nourished,Mild distress, Non-toxic appearance.   HENT: Normocephalic, Atraumatic, Bilateral external ears normal, Oropharynx moist, No oral exudates, Nose normal.   Eyes: PERRLA, EOMI, Conjunctiva normal, No discharge.   Neck: Normal range of motion, No tenderness, Supple, No stridor.   Cardiovascular: Regular rhythm, no murmurs, slight anterior chest wall tenderness palpation  Thorax & Lungs: Clear to auscultation bilaterally  Abdomen: Bowel sounds normal, Soft, No tenderness, No masses, No pulsatile masses.   Skin: Warm, Dry, No erythema, No rash.   Back: No tenderness, No CVA tenderness.   Extremities: Intact distal pulses, No tenderness, No cyanosis, No clubbing. No edema  Neurologic: Alert & oriented x 3, Normal motor function, Normal sensory function, No focal deficits noted.     Results for orders placed or performed during the hospital encounter of 11/27/17   Troponin   Result Value Ref Range    Troponin I <0.02 0.00 - 0.04 ng/mL   Btype Natriuretic Peptide   Result Value Ref Range    B Natriuretic Peptide 31 0 - 100 pg/mL   CBC with Differential   Result Value Ref Range    WBC 12.4 (H) 4.8 - 10.8 K/uL    RBC 5.07 4.70 - 6.10 M/uL    Hemoglobin 15.6 14.0 - 18.0 g/dL    Hematocrit 45.6 42.0 - 52.0 %    MCV 89.9 81.4 - 97.8 fL    MCH 30.8 27.0 - " 33.0 pg    MCHC 34.2 33.7 - 35.3 g/dL    RDW 41.0 35.9 - 50.0 fL    Platelet Count 211 164 - 446 K/uL    MPV 10.4 9.0 - 12.9 fL    Neutrophils-Polys 80.40 (H) 44.00 - 72.00 %    Lymphocytes 11.20 (L) 22.00 - 41.00 %    Monocytes 7.60 0.00 - 13.40 %    Eosinophils 0.40 0.00 - 6.90 %    Basophils 0.20 0.00 - 1.80 %    Immature Granulocytes 0.20 0.00 - 0.90 %    Nucleated RBC 0.00 /100 WBC    Neutrophils (Absolute) 9.98 (H) 1.82 - 7.42 K/uL    Lymphs (Absolute) 1.39 1.00 - 4.80 K/uL    Monos (Absolute) 0.94 (H) 0.00 - 0.85 K/uL    Eos (Absolute) 0.05 0.00 - 0.51 K/uL    Baso (Absolute) 0.03 0.00 - 0.12 K/uL    Immature Granulocytes (abs) 0.03 0.00 - 0.11 K/uL    NRBC (Absolute) 0.00 K/uL   Complete Metabolic Panel (CMP)   Result Value Ref Range    Sodium 137 135 - 145 mmol/L    Potassium 4.2 3.6 - 5.5 mmol/L    Chloride 101 96 - 112 mmol/L    Co2 26 20 - 33 mmol/L    Anion Gap 10.0 0.0 - 11.9    Glucose 113 (H) 65 - 99 mg/dL    Bun 14 8 - 22 mg/dL    Creatinine 0.99 0.50 - 1.40 mg/dL    Calcium 9.5 8.4 - 10.2 mg/dL    AST(SGOT) 22 12 - 45 U/L    ALT(SGPT) 44 2 - 50 U/L    Alkaline Phosphatase 88 30 - 99 U/L    Total Bilirubin 0.9 0.1 - 1.5 mg/dL    Albumin 4.7 3.2 - 4.9 g/dL    Total Protein 7.6 6.0 - 8.2 g/dL    Globulin 2.9 1.9 - 3.5 g/dL    A-G Ratio 1.6 g/dL   Prothrombin Time   Result Value Ref Range    PT 13.4 12.0 - 14.6 sec    INR 1.04 0.87 - 1.13   APTT   Result Value Ref Range    APTT 30.9 24.7 - 36.0 sec   Lipase   Result Value Ref Range    Lipase 20 7 - 58 U/L   ESTIMATED GFR   Result Value Ref Range    GFR If African American >60 >60 mL/min/1.73 m 2    GFR If Non African American >60 >60 mL/min/1.73 m 2   Troponin in two (2) hours   Result Value Ref Range    Troponin I <0.02 0.00 - 0.04 ng/mL   EKG (ER)   Result Value Ref Range    Report       [facility] Emergency Dept.    Test Date:  2017-11-27  Pt Name:    CINTHIA JHA             Department: [facility]  MRN:        [id number]                       Room:       -ROOM 8  Gender:     M                            Technician: [id number]  :        [id number]                   Requested By:ER TRIAGE PROTOCOL  Order #:    [id number]                    Reading MD: ANA TIDWELL MD    Measurements  Intervals                                Axis  Rate:       81                           P:          -3  TN:         167                          QRS:        -22  QRSD:       93                           T:          0  QT:         358  QTc:        416    Interpretive Statements  Sinus rhythm  Borderline left axis deviation  ST elevation suggests acute pericarditis  Compared to ECG 11/15/2017 14:11:44  ST (T wave) deviation now present  Left anterior fascicular block no longer present    Electronically Signed On 2017 13:44:17 PST by ANA TIDWELL MD          RADIOLOGY/PROCEDURES  DX-CHEST-LIMITED (1 VIEW)   Final Result         Diffuse interstitial prominence could relate to mild pulmonary edema.      Stable cardiomegaly.            COURSE & MEDICAL DECISION MAKING  Pertinent Labs & Imaging studies reviewed. (See chart for details)  Patient with anterior chest wall tenderness, minimal risk factors, recent stress test, this does not appear to be ACS, low check a troponin, and a delta troponin. Chest x-rays negative except for questionable mild pulmonary edema but I believe this is likely secondary to patient's body habitus., BMP is normal    Troponin ×2 is negative, recent stress test, the patient has chest wall tenderness palpation, believe the patient's knees multiple times today straining his chest wall he is feeling improved after Toradol, we'll discharge the patient home, have the patient follow up with cardiology as an outpatient, return with any other concerns.    FINAL IMPRESSION  1. Chest wall pain        Patient referred to primary care provider for blood pressure management     This dictation was created using voice recognition software.  The accuracy of the dictation is limited to the abilities of the software. I expect there may be some errors of grammar and possibly content. The nursing notes were reviewed and certain aspects of this information were incorporated into this note.    Electronically signed by: Jordan Barger, 11/27/2017 2:19 PM

## 2017-11-27 NOTE — TELEPHONE ENCOUNTER
----- Message from Alejandra Keen L.P.N. sent at 11/27/2017 12:34 PM PST -----  Regarding: FW: tightness in chest since yesterday afternoon  Contact: [phone]      ----- Message -----  From: Martha Martel  Sent: 11/27/2017  10:53 AM  To: Misty Orellana R.N.  Subject: tightness in chest since yesterday afternoon     PILAR/Misty    Pt called to report since yesterday afternoon he has felt tightness in the center of his chest that has not gone away. He is concerned and would please like a call back at [phone]

## 2017-11-28 NOTE — DISCHARGE INSTRUCTIONS
Please follow-up with your primary care provider for blood pressure management.        Chest Wall Pain  Chest wall pain is pain in or around the bones and muscles of your chest. It may take up to 6 weeks to get better. It may take longer if you must stay physically active in your work and activities.   CAUSES   Chest wall pain may happen on its own. However, it may be caused by:  · A viral illness like the flu.  · Injury.  · Coughing.  · Exercise.  · Arthritis.  · Fibromyalgia.  · Shingles.  HOME CARE INSTRUCTIONS   · Avoid overtiring physical activity. Try not to strain or perform activities that cause pain. This includes any activities using your chest or your abdominal and side muscles, especially if heavy weights are used.  · Put ice on the sore area.  ¨ Put ice in a plastic bag.  ¨ Place a towel between your skin and the bag.  ¨ Leave the ice on for 15-20 minutes per hour while awake for the first 2 days.  · Only take over-the-counter or prescription medicines for pain, discomfort, or fever as directed by your caregiver.  SEEK IMMEDIATE MEDICAL CARE IF:   · Your pain increases, or you are very uncomfortable.  · You have a fever.  · Your chest pain becomes worse.  · You have new, unexplained symptoms.  · You have nausea or vomiting.  · You feel sweaty or lightheaded.  · You have a cough with phlegm (sputum), or you cough up blood.  MAKE SURE YOU:   · Understand these instructions.  · Will watch your condition.  · Will get help right away if you are not doing well or get worse.     This information is not intended to replace advice given to you by your health care provider. Make sure you discuss any questions you have with your health care provider.     Document Released: 12/18/2006 Document Revised: 03/11/2013 Document Reviewed: 03/14/2016  [employer] Interactive Patient Education ©2016 [employer]

## 2018-02-20 ENCOUNTER — OFFICE VISIT (OUTPATIENT)
Dept: CARDIOLOGY | Facility: MEDICAL CENTER | Age: 58
End: 2018-02-20
Payer: COMMERCIAL

## 2018-02-20 VITALS
WEIGHT: 279 LBS | HEART RATE: 75 BPM | HEIGHT: 69 IN | DIASTOLIC BLOOD PRESSURE: 76 MMHG | SYSTOLIC BLOOD PRESSURE: 124 MMHG | BODY MASS INDEX: 41.32 KG/M2 | OXYGEN SATURATION: 95 %

## 2018-02-20 DIAGNOSIS — I48.0 PAF (PAROXYSMAL ATRIAL FIBRILLATION) (HCC): ICD-10-CM

## 2018-02-20 LAB — EKG IMPRESSION: NORMAL

## 2018-02-20 PROCEDURE — 93280 PM DEVICE PROGR EVAL DUAL: CPT | Performed by: INTERNAL MEDICINE

## 2018-02-20 PROCEDURE — 93000 ELECTROCARDIOGRAM COMPLETE: CPT | Mod: 59 | Performed by: INTERNAL MEDICINE

## 2018-02-20 PROCEDURE — 99214 OFFICE O/P EST MOD 30 MIN: CPT | Mod: 25 | Performed by: INTERNAL MEDICINE

## 2018-02-20 ASSESSMENT — ENCOUNTER SYMPTOMS: PALPITATIONS: 0

## 2018-02-20 NOTE — PROGRESS NOTES
"Subjective:   Juan Martel is a 57 y.o. male who presents today for follow up of ppm and a fib    Chief Complaint: feeling good    Not losing weight because of the time of the year.  [place] ppm with 1 minute a fib in last 3 months.  11/30.  He tore a pec muscle at that time.    Otherwise feeling good.    Not on anything for oac.      Past Medical History:   Diagnosis Date   • A-fib (CMS-[facility])    • At risk for sleep apnea    • Cardiac asystole (CMS-[facility]) 12/20/16    possible vaso-vagal   • Cardiac pacemaker in situ 12/20/2016   • Gout    • Pneumonia 2000   • Sleep apnea     CPAP     Past Surgical History:   Procedure Laterality Date   • OTHER CARDIAC SURGERY  12/20/16    pacemaker   • OTHER ORTHOPEDIC SURGERY  6/1/1990    achilles tendon repair left foot     Family History   Problem Relation Age of Onset   • Other Mother    • Heart Disease Mother      atrial fib   • Stroke Father 65     CVa   • Diabetes Father    • Cancer Sister    • Stroke Paternal Grandmother      History   Smoking Status   • Never Smoker   Smokeless Tobacco   • Never Used     Allergies   Allergen Reactions   • Bloodless    • Codeine Vomiting   • Tramadol Vomiting and Nausea     Outpatient Encounter Prescriptions as of 2/20/2018   Medication Sig Dispense Refill   • Magnesium 500 MG Tab Take 1 Cap by mouth every day.     • metoprolol SR (TOPROL XL) 25 MG TABLET SR 24 HR Take 1 Tab by mouth every day. 90 Tab 2   • allopurinol (ZYLOPRIM) 300 MG Tab Take 300 mg by mouth every day.       No facility-administered encounter medications on file as of 2/20/2018.      Review of Systems   Cardiovascular: Negative for palpitations.        Objective:   /76   Pulse 75   Ht 1.753 m (5' 9.02\")   Wt (!) 126.6 kg (279 lb)   SpO2 95%   BMI 41.18 kg/m²     Physical Exam   Constitutional: He is oriented to person, place, and time. He appears well-developed and well-nourished. No distress.   HENT:   Head: Normocephalic and atraumatic.   Right Ear: External " ear normal.   Left Ear: External ear normal.   Nose: Nose normal.   Mouth/Throat: Oropharynx is clear and moist.   Eyes: Conjunctivae and EOM are normal. Pupils are equal, round, and reactive to light. Right eye exhibits no discharge. Left eye exhibits no discharge. No scleral icterus.   Neck: Normal range of motion. Neck supple. No JVD present. No tracheal deviation present.   Cardiovascular: Normal rate, regular rhythm, normal heart sounds and intact distal pulses.  Exam reveals no gallop and no friction rub.    No murmur heard.  Pulmonary/Chest: Effort normal and breath sounds normal. No stridor. No respiratory distress. He has no wheezes. He has no rales. He exhibits no tenderness.   Abdominal: Soft. He exhibits no distension. There is no tenderness.   Musculoskeletal: He exhibits no edema or tenderness.   Neurological: He is alert and oriented to person, place, and time. No cranial nerve deficit. Coordination normal.   Skin: Skin is warm and dry. No rash noted. He is not diaphoretic. No erythema. No pallor.   Psychiatric: He has a normal mood and affect. His behavior is normal. Judgment and thought content normal.   Vitals reviewed.      Assessment:     1. PAF (paroxysmal atrial fibrillation) (CMS-[facility])  EKG       Medical Decision Making:  Today's Assessment / Status / Plan:   Doing generally well.  Continue metoprolol  chads o so no oac reasonable.

## 2018-07-14 ENCOUNTER — OFFICE VISIT (OUTPATIENT)
Dept: URGENT CARE | Facility: CLINIC | Age: 58
End: 2018-07-14
Payer: COMMERCIAL

## 2018-07-14 VITALS
DIASTOLIC BLOOD PRESSURE: 88 MMHG | SYSTOLIC BLOOD PRESSURE: 120 MMHG | OXYGEN SATURATION: 96 % | RESPIRATION RATE: 15 BRPM | TEMPERATURE: 98 F | WEIGHT: 279 LBS | HEIGHT: 69 IN | BODY MASS INDEX: 41.32 KG/M2 | HEART RATE: 60 BPM

## 2018-07-14 DIAGNOSIS — R42 VERTIGO: ICD-10-CM

## 2018-07-14 PROCEDURE — 99214 OFFICE O/P EST MOD 30 MIN: CPT | Performed by: NURSE PRACTITIONER

## 2018-07-14 ASSESSMENT — ENCOUNTER SYMPTOMS
DIZZINESS: 1
FEVER: 0

## 2018-07-14 NOTE — PROGRESS NOTES
Subjective:      Juan Martel is a 58 y.o. male who presents with Dizziness (x3wks ago, hay fever, bend over get dizzy, suspects ears)    Past Medical History:   Diagnosis Date   • A-fib (HCC)    • At risk for sleep apnea    • Cardiac asystole (HCC) 12/20/16    possible vaso-vagal   • Cardiac pacemaker in situ 12/20/2016   • Gout    • Pneumonia 2000   • Sleep apnea     CPAP     Social History     Social History   • Marital status:      Spouse name: N/A   • Number of children: N/A   • Years of education: N/A     Occupational History   • Not on file.     Social History Main Topics   • Smoking status: Never Smoker   • Smokeless tobacco: Never Used   • Alcohol use No      Comment: occasional   • Drug use: No   • Sexual activity: Not on file     Other Topics Concern   • Not on file     Social History Narrative   • No narrative on file     Family History   Problem Relation Age of Onset   • Other Mother    • Heart Disease Mother      atrial fib   • Stroke Father 65     CVa   • Diabetes Father    • Cancer Sister    • Stroke Paternal Grandmother        Allergies: Bloodless; Codeine; and Tramadol    Patient is a 58-year-old male who presents with complaint of onset of dizziness approximately 3 weeks ago. States dizziness only occurs when he bends over. When he is dizzy, he describes this as a sensation of spinning and he does get nauseated and has had episodes of vomiting. He denies headache, chest pain, shortness of breath, lightheadedness, near-syncope or syncope. Patient states that he had some allergy and hay fever type symptoms about 3 weeks ago and believes that his present symptoms are recurring because of that. Denies any prior history of dizziness or vertigo.            Dizziness   This is a new problem. The current episode started 1 to 4 weeks ago. The problem occurs intermittently. The problem has been waxing and waning. Pertinent negatives include no fever. Associated symptoms comments: Ear  "fullness  Allergy symptoms; patient describes \"hay fever\" . Nothing aggravates the symptoms. He has tried nothing for the symptoms. The treatment provided no relief.       Review of Systems   Constitutional: Negative for fever.   Neurological: Positive for dizziness.   All other systems reviewed and are negative.         Objective:     /88   Pulse 60   Temp 36.7 °C (98 °F)   Resp 15   Ht 1.753 m (5' 9\")   Wt (!) 126.6 kg (279 lb)   SpO2 96%   BMI 41.20 kg/m²      Physical Exam   Constitutional: He is oriented to person, place, and time. He appears well-developed and well-nourished.   HENT:   Head: Normocephalic.   Right Ear: External ear normal.   Left Ear: External ear normal.   Nose: Nose normal.   Mouth/Throat: Oropharynx is clear and moist. No oropharyngeal exudate.   Eyes: Conjunctivae and EOM are normal. Pupils are equal, round, and reactive to light. Right eye exhibits no discharge. Left eye exhibits no discharge.   Neck: Normal range of motion. Neck supple.   Cardiovascular: Normal rate and regular rhythm.    Pulmonary/Chest: Effort normal and breath sounds normal.   Musculoskeletal: Normal range of motion.   Neurological: He is alert and oriented to person, place, and time. He has normal strength. He displays no atrophy and no tremor. No cranial nerve deficit or sensory deficit. He exhibits normal muscle tone. He displays a negative Romberg sign. He displays no seizure activity. Coordination and gait normal. GCS eye subscore is 4. GCS verbal subscore is 5. GCS motor subscore is 6.   Cranial nerves II through XII intact. Cerebellar function intact. Motor coordination intact. Proprioception intact.  5/5 and equal in the upper extremities. Strength is 5/5 and equal in the upper and lower extremities. Romberg negative, no pronator drift. Facial features symmetric with equal movements. Speech is clear and logical. Shoulder shrug equal. Pupils equally round and reactive, EOMs intact.   Skin: " Skin is warm and dry. Capillary refill takes less than 2 seconds.   Psychiatric: He has a normal mood and affect. His behavior is normal. Judgment and thought content normal.   Vitals reviewed.      Arsenio-Hallpike maneuver is positive on the left side and produces dizziness as well.             Assessment/Plan:     1. Positional vertigo  -written instructions given for Eply's maneuvers  -Antivert PRN  -Referral given to ENT  -Strict ER precautions for increasing Dizziness, headache, ataxia or any other neurologic changes.

## 2018-09-04 ENCOUNTER — OFFICE VISIT (OUTPATIENT)
Dept: CARDIOLOGY | Facility: MEDICAL CENTER | Age: 58
End: 2018-09-04
Payer: COMMERCIAL

## 2018-09-04 VITALS
WEIGHT: 268 LBS | DIASTOLIC BLOOD PRESSURE: 74 MMHG | OXYGEN SATURATION: 94 % | BODY MASS INDEX: 39.69 KG/M2 | HEART RATE: 69 BPM | SYSTOLIC BLOOD PRESSURE: 122 MMHG | HEIGHT: 69 IN

## 2018-09-04 DIAGNOSIS — Z95.0 PACEMAKER: ICD-10-CM

## 2018-09-04 DIAGNOSIS — I48.0 PAF (PAROXYSMAL ATRIAL FIBRILLATION) (HCC): ICD-10-CM

## 2018-09-04 DIAGNOSIS — Z98.890 H/O CARDIAC RADIOFREQUENCY ABLATION: ICD-10-CM

## 2018-09-04 DIAGNOSIS — I49.5 SSS (SICK SINUS SYNDROME) (HCC): ICD-10-CM

## 2018-09-04 DIAGNOSIS — M10.9 GOUT, UNSPECIFIED CAUSE, UNSPECIFIED CHRONICITY, UNSPECIFIED SITE: ICD-10-CM

## 2018-09-04 PROCEDURE — 93000 ELECTROCARDIOGRAM COMPLETE: CPT | Mod: 59 | Performed by: NURSE PRACTITIONER

## 2018-09-04 PROCEDURE — 93288 INTERROG EVL PM/LDLS PM IP: CPT | Performed by: NURSE PRACTITIONER

## 2018-09-04 PROCEDURE — 99214 OFFICE O/P EST MOD 30 MIN: CPT | Mod: 25 | Performed by: NURSE PRACTITIONER

## 2018-09-04 RX ORDER — ALLOPURINOL 300 MG/1
300 TABLET ORAL DAILY
Qty: 30 TAB | Refills: 3 | Status: SHIPPED | OUTPATIENT
Start: 2018-09-04 | End: 2019-01-06 | Stop reason: SDUPTHER

## 2018-09-04 RX ORDER — METOPROLOL SUCCINATE 25 MG/1
TABLET, EXTENDED RELEASE ORAL
COMMUNITY
Start: 2018-07-05 | End: 2018-10-09

## 2018-09-04 ASSESSMENT — ENCOUNTER SYMPTOMS
FEVER: 0
DIARRHEA: 0
DIZZINESS: 0
STRIDOR: 0
SHORTNESS OF BREATH: 0
FOCAL WEAKNESS: 0
SENSORY CHANGE: 0
PALPITATIONS: 0
BLOOD IN STOOL: 0
HEADACHES: 0
BLURRED VISION: 0
SORE THROAT: 0
DOUBLE VISION: 0
SPUTUM PRODUCTION: 0
ABDOMINAL PAIN: 0
LOSS OF CONSCIOUSNESS: 0
WHEEZING: 0
TINGLING: 0
TREMORS: 0
SPEECH CHANGE: 0
COUGH: 0
PND: 0
ORTHOPNEA: 0
CHILLS: 0
WEIGHT LOSS: 0
HEMOPTYSIS: 0
WEAKNESS: 0
HEARTBURN: 0
VOMITING: 0
NAUSEA: 0

## 2018-09-04 NOTE — PROGRESS NOTES
Cardiology/Electrophysiology Follow-up Note      Subjective:   Chief Complaint:   Chief Complaint   Patient presents with   • Atrial Fibrillation     PP DX:PAF       Juan Martel is a 58 y.o. male who presents today for follow up atrial fibrillation and PPM.    He is a previous patient of Dr. Raza, has yet to establish with new cardiologist.  Past medical history also significant for SSS S/P [place] dual chamber PPM, Atrial fibrillation S/ P ablation, ARCHANA.    Today in follow up he states that he has been doing well since his last office visit with Dr. Raza.  He has not noticed any palpitations or arrhythmia.  He has continued going to the gym and exercising without symptoms but has also changes is diet and noticing changes on the scale since then.  He had a kidney stone this last spring though to be secondary to uric acid in which he now takes allopurinol for.  He is in need of a refill of this, prescribed by PCP but unable to establish with a new provider yet.    He denies chest pain, dizziness, palpitations, pre syncope or syncope, dyspnea, PND, orthopnea, or lower extremity edema.      Patient endorses medication compliance        Past Medical History:   Diagnosis Date   • A-fib ([facility])    • At risk for sleep apnea    • Cardiac asystole ([facility]) 12/20/16    possible vaso-vagal   • Cardiac pacemaker in situ 12/20/2016   • Gout    • Pneumonia 2000   • Sleep apnea     CPAP     Past Surgical History:   Procedure Laterality Date   • OTHER CARDIAC SURGERY  12/20/16    pacemaker   • OTHER ORTHOPEDIC SURGERY  6/1/1990    achilles tendon repair left foot     Family History   Problem Relation Age of Onset   • Other Mother    • Heart Disease Mother         atrial fib   • Stroke Father 65        CVa   • Diabetes Father    • Cancer Sister    • Stroke Paternal Grandmother      Social History     Social History   • Marital status:      Spouse name: N/A   • Number of children: N/A   • Years of education: N/A  "    Occupational History   • Not on file.     Social History Main Topics   • Smoking status: Never Smoker   • Smokeless tobacco: Never Used   • Alcohol use No      Comment: occasional   • Drug use: No   • Sexual activity: Not on file     Other Topics Concern   • Not on file     Social History Narrative   • No narrative on file     Allergies   Allergen Reactions   • Bloodless    • Codeine Vomiting   • Tramadol Vomiting and Nausea       Current Outpatient Prescriptions   Medication Sig Dispense Refill   • metoprolol SR (TOPROL XL) 25 MG TABLET SR 24 HR TAKE 1 TABLET BY MOUTH EVERY DAY 90 Tab 3   • Magnesium 500 MG Tab Take 1 Cap by mouth every day.     • allopurinol (ZYLOPRIM) 300 MG Tab Take 300 mg by mouth every day.       No current facility-administered medications for this visit.        Review of Systems   Constitutional: Negative for chills, fever, malaise/fatigue and weight loss.   HENT: Negative for congestion, sore throat and tinnitus.    Eyes: Negative for blurred vision and double vision.   Respiratory: Negative for cough, hemoptysis, sputum production, shortness of breath, wheezing and stridor.    Cardiovascular: Negative for chest pain, palpitations, orthopnea, leg swelling and PND.   Gastrointestinal: Negative for abdominal pain, blood in stool, diarrhea, heartburn, nausea and vomiting.   Skin: Negative for rash.   Neurological: Negative for dizziness, tingling, tremors, sensory change, speech change, focal weakness, loss of consciousness, weakness and headaches.     All others systems reviewed and negative.     Objective:     Blood pressure 122/74, pulse 69, height 1.753 m (5' 9.02\"), weight 121.6 kg (268 lb), SpO2 94 %. Body mass index is 39.56 kg/m².    Physical Exam   Constitutional: He is oriented to person, place, and time and well-developed, well-nourished, and in no distress.   HENT:   Head: Normocephalic and atraumatic.   Eyes: Pupils are equal, round, and reactive to light. Conjunctivae and EOM " are normal.   Neck: Normal range of motion. Neck supple. No JVD present. No tracheal deviation present.   Cardiovascular: Normal rate, regular rhythm, normal heart sounds and intact distal pulses.  Exam reveals no gallop and no friction rub.    No murmur heard.  Pulses:       Radial pulses are 2+ on the right side, and 2+ on the left side.        Dorsalis pedis pulses are 2+ on the right side, and 2+ on the left side.        Posterior tibial pulses are 2+ on the right side, and 2+ on the left side.   No carotid bruits bilaterally.    Pulmonary/Chest: Effort normal and breath sounds normal. No respiratory distress. He has no wheezes. He has no rales. He exhibits no tenderness.   Left chest PPM site without erythema or induration   Abdominal: Soft. Bowel sounds are normal.   Musculoskeletal: Normal range of motion. He exhibits no edema.   Neurological: He is alert and oriented to person, place, and time.   Skin: Skin is warm and dry.   Psychiatric: Mood, memory, affect and judgment normal.         Cardiac Imaging and Procedures Review:    Device Interrogation dated 18  RA: Sensin.4 mV, Threshold: 0.8V, Impedance: 616.  RV: Sensin mV, Threshold: 0.6 V, Impedance: 685.    Echo dated 17:   CONCLUSIONS  Normal left ventricular systolic function.  Left ventricular ejection fraction is visually estimated to be 55%.  Normal diastolic function.  Mildly dilated right ventricle.  The right atrium is normal in size.  The left atrium is normal in size.  Structurally normal mitral valve without significant stenosis or   regurgitation.  Structurally normal aortic valve without significant stenosis or   regurgitation.  Estimated right ventricular systolic pressure  is 30 mmHg.  Compared to the prior echo of 2014, the EF is mildly improved        Assessment:     1. PAF (paroxysmal atrial fibrillation) ([facility])  EKG   2. Pacemaker     3. SSS (sick sinus syndrome) ([facility])     4. Gout, unspecified cause, unspecified  chronicity, unspecified site         Medical Decision Making:  Today's Assessment / Status / Plan:   1. PAF S/P ablation:  - Clinically doing well since his last office visit with Dr. Raza.  Remains off Tikosyn and OAC.  - Brief ATR logged in device (april) 8 seconds in length.    - The patients [id number] score is 0 and elects to stay off OAC which I think is ok for now.  He will continue to monitor for any arrhthymias.     2. [place] PPM for SSS:  - Device is working normally on interrogation today.  - Lead threshold is stable.   - Battery longevity 8 years.    3. Gout:  - States no active symptoms or flare.  - Is waiting for new provider appointment with PCP but scheduled out quite a ways.  Will refill  Allopurinol this once, advised further refills need to come from PCP.     Plan reviewed in detail with the patient and He verbalizes understanding and is in agreement.   RTC in 3 months for device check and 6 months to establish with Dr. Lindsey, sooner if clinical condition changes  Collaborating MD/ADD: MIGUEL Lindsey.     ALEX Rodriguez

## 2018-09-05 LAB — EKG IMPRESSION: NORMAL

## 2018-09-12 ENCOUNTER — TELEPHONE (OUTPATIENT)
Dept: CARDIOLOGY | Facility: MEDICAL CENTER | Age: 58
End: 2018-09-12

## 2018-09-12 DIAGNOSIS — I48.19 PERSISTENT ATRIAL FIBRILLATION (HCC): ICD-10-CM

## 2018-09-13 ENCOUNTER — NON-PROVIDER VISIT (OUTPATIENT)
Dept: CARDIOLOGY | Facility: MEDICAL CENTER | Age: 58
End: 2018-09-13
Payer: COMMERCIAL

## 2018-09-13 ENCOUNTER — OFFICE VISIT (OUTPATIENT)
Dept: CARDIOLOGY | Facility: MEDICAL CENTER | Age: 58
End: 2018-09-13
Payer: COMMERCIAL

## 2018-09-13 VITALS
OXYGEN SATURATION: 95 % | HEART RATE: 62 BPM | BODY MASS INDEX: 39.56 KG/M2 | SYSTOLIC BLOOD PRESSURE: 124 MMHG | WEIGHT: 268 LBS | DIASTOLIC BLOOD PRESSURE: 80 MMHG

## 2018-09-13 DIAGNOSIS — G47.33 OSA (OBSTRUCTIVE SLEEP APNEA): ICD-10-CM

## 2018-09-13 DIAGNOSIS — I48.0 PAROXYSMAL ATRIAL FIBRILLATION (HCC): ICD-10-CM

## 2018-09-13 DIAGNOSIS — Z86.79 S/P ABLATION OF ATRIAL FIBRILLATION: ICD-10-CM

## 2018-09-13 DIAGNOSIS — Z98.890 S/P ABLATION OF ATRIAL FIBRILLATION: ICD-10-CM

## 2018-09-13 DIAGNOSIS — Z79.01 CHRONIC ANTICOAGULATION: ICD-10-CM

## 2018-09-13 PROCEDURE — 99214 OFFICE O/P EST MOD 30 MIN: CPT | Performed by: NURSE PRACTITIONER

## 2018-09-13 PROCEDURE — 93000 ELECTROCARDIOGRAM COMPLETE: CPT | Performed by: NURSE PRACTITIONER

## 2018-09-13 ASSESSMENT — ENCOUNTER SYMPTOMS
FEVER: 0
SPUTUM PRODUCTION: 0
BLURRED VISION: 0
SHORTNESS OF BREATH: 0
CHILLS: 0
WHEEZING: 0
TREMORS: 0
VOMITING: 0
HEADACHES: 0
STRIDOR: 0
LOSS OF CONSCIOUSNESS: 0
BLOOD IN STOOL: 0
TINGLING: 0
DIZZINESS: 0
HEMOPTYSIS: 0
SORE THROAT: 0
DOUBLE VISION: 0
SPEECH CHANGE: 0
HEARTBURN: 0
PND: 0
ABDOMINAL PAIN: 0
COUGH: 0
DIARRHEA: 0
ORTHOPNEA: 0
NAUSEA: 0
SENSORY CHANGE: 0
WEAKNESS: 0
WEIGHT LOSS: 0
PSYCHIATRIC NEGATIVE: 1
MUSCULOSKELETAL NEGATIVE: 1
FOCAL WEAKNESS: 0
PALPITATIONS: 1

## 2018-09-13 NOTE — PROGRESS NOTES
Cardiology/Electrophysiology Follow-up Note      Subjective:   Chief Complaint:   Chief Complaint   Patient presents with   • Atrial Fibrillation     F/V DX:AFIB       Juan Martel is a 58 y.o. male who presents today as add on for reversion back to atrial fibrillation.     He is a previous patient of Dr. Raza.  Past medical history also significant for Afib S/ P ablation, SSS S/P placement of [place] PPM, ARCHANA, history of mildly depressed LVEF with recovery of LVEF to 55% likely from afib control.     Mr. Martel call late yesterday with concerns that that had gone back into afib.  He had previously stopped Tikosyn and eventually has gone off his blood thinner when no Afib episodes were seen in his device after his ablation and stopping Tikosyn.  He was recommended to restart his Xarelto and come in for device check today so his rates and Afib burden could be further assessed.    PPM check today reveals current episode of Afib and episodes of Afib restarting on 9/8/18.  His ventricular rates are elevated.     Today in follow up he [place].  He returned to the gym the week and noticed that his work out have been harder to get though, more malaise, thought that something could be wrong.  He checked his pulse rate at the gym yesterday and found it to be more elevated than usual.      He denies chest pain, dizziness, pre syncope or syncope, dyspnea, PND, orthopnea, or lower extremity edema.      Patient endorses medication compliance.        Past Medical History:   Diagnosis Date   • A-fib (HCC)    • At risk for sleep apnea    • Cardiac asystole (HCC) 12/20/16    possible vaso-vagal   • Cardiac pacemaker in situ 12/20/2016   • Gout    • Pneumonia 2000   • Sleep apnea     CPAP     Past Surgical History:   Procedure Laterality Date   • OTHER CARDIAC SURGERY  12/20/16    pacemaker   • OTHER ORTHOPEDIC SURGERY  6/1/1990    achilles tendon repair left foot     Family  History   Problem Relation Age of Onset   • Other Mother    • Heart Disease Mother         atrial fib   • Stroke Father 65        CVa   • Diabetes Father    • Cancer Sister    • Stroke Paternal Grandmother      Social History     Social History   • Marital status:      Spouse name: N/A   • Number of children: N/A   • Years of education: N/A     Occupational History   • Not on file.     Social History Main Topics   • Smoking status: Never Smoker   • Smokeless tobacco: Never Used   • Alcohol use No      Comment: occasional   • Drug use: No   • Sexual activity: Not on file     Other Topics Concern   • Not on file     Social History Narrative   • No narrative on file     Allergies   Allergen Reactions   • Bloodless    • Codeine Vomiting   • Tramadol Vomiting and Nausea       Current Outpatient Prescriptions   Medication Sig Dispense Refill   • rivaroxaban (XARELTO) 20 MG Tab tablet Take 1 Tab by mouth with dinner. 30 Tab 6   • allopurinol (ZYLOPRIM) 300 MG Tab Take 1 Tab by mouth every day. 30 Tab 3   • metoprolol SR (TOPROL XL) 25 MG TABLET SR 24 HR TAKE 1 TABLET BY MOUTH EVERY DAY 90 Tab 3   • Magnesium 500 MG Tab Take 1 Cap by mouth every day.     • metoprolol SR (TOPROL XL) 25 MG TABLET SR 24 HR        No current facility-administered medications for this visit.        Review of Systems   Constitutional: Positive for malaise/fatigue. Negative for chills, fever and weight loss.   HENT: Negative for congestion, sore throat and tinnitus.    Eyes: Negative for blurred vision and double vision.   Respiratory: Negative for cough, hemoptysis, sputum production, shortness of breath, wheezing and stridor.    Cardiovascular: Positive for palpitations. Negative for chest pain, orthopnea, leg swelling and PND.   Gastrointestinal: Negative for abdominal pain, blood in stool, diarrhea, heartburn, nausea and vomiting.   Genitourinary: Negative.    Musculoskeletal: Negative.    Skin: Negative for rash.   Neurological:  Negative for dizziness, tingling, tremors, sensory change, speech change, focal weakness, loss of consciousness, weakness and headaches.   Psychiatric/Behavioral: Negative.      All others systems reviewed and negative.     Objective:     Blood pressure 124/80, pulse 62, weight 121.6 kg (268 lb), SpO2 95 %. Body mass index is 39.56 kg/m².    Physical Exam   Constitutional: He is oriented to person, place, and time and well-developed, well-nourished, and in no distress.   HENT:   Head: Normocephalic and atraumatic.   Eyes: Pupils are equal, round, and reactive to light. Conjunctivae and EOM are normal.   Neck: Normal range of motion. Neck supple. No JVD present. No tracheal deviation present.   Cardiovascular: Normal heart sounds and intact distal pulses.  An irregularly irregular rhythm present. Tachycardia present.  Exam reveals no gallop and no friction rub.    No murmur heard.  Pulses:       Radial pulses are 2+ on the right side, and 2+ on the left side.        Dorsalis pedis pulses are 2+ on the right side, and 2+ on the left side.        Posterior tibial pulses are 2+ on the right side, and 2+ on the left side.   Pulmonary/Chest: Effort normal and breath sounds normal. No respiratory distress. He has no wheezes. He has no rales. He exhibits no tenderness.   Abdominal: Soft. Bowel sounds are normal.   Musculoskeletal: Normal range of motion. He exhibits no edema.   Neurological: He is alert and oriented to person, place, and time.   Skin: Skin is warm and dry.   Psychiatric: Mood, memory, affect and judgment normal.         Cardiac Imaging and Procedures Review:    EKG dated 9/13/18:   V paced, Afib, rate 137.     Echo dated 8/23/17:   CONCLUSIONS  .Normal left ventricular systolic function.  Left ventricular ejection fraction is visually estimated to be 55%.  Normal diastolic function.  Mildly dilated right ventricle.  The right atrium is normal in size.  The left atrium is normal in size.  Structurally normal  mitral valve without significant stenosis or   regurgitation.  Structurally normal aortic valve without significant stenosis or   regurgitation.  Estimated right ventricular systolic pressure  is 30 mmHg.  Compared to the prior echo of 4/14/ 2014, the EF is mildly improved    Nuclear Perfusion Imaging PET (10/6/16):   CONCLUSIONS AND IMPRESSIONS:  Homogenous uptake of tracer with mild   nonreversible ischemia in the mid inferior septal distribution, likely   artifact.  Conclusion, global hypokinesis with no inducible ischemia.    Labs (personally reviewed and notable for):   Lab Results   Component Value Date/Time    SODIUM 137 11/27/2017 01:00 PM    POTASSIUM 4.2 11/27/2017 01:00 PM    CHLORIDE 101 11/27/2017 01:00 PM    CO2 26 11/27/2017 01:00 PM    GLUCOSE 113 (H) 11/27/2017 01:00 PM    BUN 14 11/27/2017 01:00 PM    CREATININE 0.99 11/27/2017 01:00 PM      Lab Results   Component Value Date/Time    WBC 12.4 (H) 11/27/2017 01:00 PM    RBC 5.07 11/27/2017 01:00 PM    HEMOGLOBIN 15.6 11/27/2017 01:00 PM    HEMATOCRIT 45.6 11/27/2017 01:00 PM    MCV 89.9 11/27/2017 01:00 PM    MCH 30.8 11/27/2017 01:00 PM    MCHC 34.2 11/27/2017 01:00 PM    MPV 10.4 11/27/2017 01:00 PM    NEUTSPOLYS 80.40 (H) 11/27/2017 01:00 PM    LYMPHOCYTES 11.20 (L) 11/27/2017 01:00 PM    MONOCYTES 7.60 11/27/2017 01:00 PM    EOSINOPHILS 0.40 11/27/2017 01:00 PM    BASOPHILS 0.20 11/27/2017 01:00 PM      PT/INR:   Lab Results   Component Value Date/Time    PROTHROMBTM 13.4 11/27/2017 01:00 PM    INR 1.04 11/27/2017 01:00 PM   ]      Assessment:     1. Paroxysmal atrial fibrillation (HCC)  EKG   2. Chronic anticoagulation     3. S/P ablation of atrial fibrillation     4. ARCHANA (obstructive sleep apnea)         Medical Decision Making:  Today's Assessment / Status / Plan:   - Patent with return of atrial fibrillation.  He has had episodes of a fib since 9/8/18. And burden currently at 54%.  His ventricular rate is elevated today though has slowed  down some during our appt time.  He is relatively asymptomatic at this time except for increased fatigue.    - I have discussed options with him to include ARACELI guided cardioversion vs consideration for starting back on Tikosyn vs potential consideration for repeat ablation if A fib continues to be a problem for him.  He is most interested in procedures over medications as he has had previously medications failures with Digoxin, Multaq, and even significant breakthrough on Tikosyn.  He cannot remember why he did not tolerate multaq or digoxin.   - Dr. Faith has an opening tomorrow and he will meet with him and discuss whether or not he should consider restarting Tikosyn vs repeat ablation vs cardioversion, though I am unsure if cardioversion would hold without antiarrhythmic meds.  He has a history of depressed LVEF (with recovery to 55% a year ago on echo) so I would defer to Dr. Faith whether could consider flecainide or not.   - He will increase his Toprol to BID for now for attempt for improved rate control.     Plan reviewed in detail with the patient and he verbalizes understanding and is in agreement.   RTC tomorrow as scheduled, sooner if clinical condition changes  Collaborating MD/ADD: SUE Kemp.

## 2018-09-13 NOTE — LETTER
[facility]   [address]  [place] [address]  Phone: [phone]  Fax: [phone]              Juan Martel  1960    Encounter Date: 9/13/2018    ALEX Rodriguez          PROGRESS NOTE:  Cardiology/Electrophysiology Follow-up Note      Subjective:   Chief Complaint:   Chief Complaint   Patient presents with   • Atrial Fibrillation     F/V DX:AFIB       Juan Martel is a 58 y.o. male who presents today as add on for reversion back to atrial fibrillation.     He is a previous patient of Dr. Raza.  Past medical history also significant for Afib S/ P ablation, SSS S/P placement of [place] PPM, ARCHANA, history of mildly depressed LVEF with recovery of LVEF to 55% likely from afib control.     Mr. Martel call late yesterday with concerns that that had gone back into afib.  He had previously stopped Tikosyn and eventually has gone off his blood thinner when no Afib episodes were seen in his device after his ablation and stopping Tikosyn.  He was recommended to restart his Xarelto and come in for device check today so his rates and Afib burden could be further assessed.    PPM check today reveals current episode of Afib and episodes of Afib restarting on 9/8/18.  His ventricular rates are elevated.     Today in follow up he [place].  He returned to the gym the week and noticed that his work out have been harder to get though, more malaise, thought that something could be wrong.  He checked his pulse rate at the gym yesterday and found it to be more elevated than usual.      He denies chest pain, dizziness, pre syncope or syncope, dyspnea, PND, orthopnea, or lower extremity edema.      Patient endorses medication compliance.        Past Medical History:   Diagnosis Date   • A-fib (HCC)    • At risk for sleep apnea    • Cardiac asystole (HCC) 12/20/16    possible vaso-vagal   • Cardiac pacemaker  in situ 12/20/2016   • Gout    • Pneumonia 2000   • Sleep apnea     CPAP     Past Surgical History:   Procedure Laterality Date   • OTHER CARDIAC SURGERY  12/20/16    pacemaker   • OTHER ORTHOPEDIC SURGERY  6/1/1990    achilles tendon repair left foot     Family History   Problem Relation Age of Onset   • Other Mother    • Heart Disease Mother         atrial fib   • Stroke Father 65        CVa   • Diabetes Father    • Cancer Sister    • Stroke Paternal Grandmother      Social History     Social History   • Marital status:      Spouse name: N/A   • Number of children: N/A   • Years of education: N/A     Occupational History   • Not on file.     Social History Main Topics   • Smoking status: Never Smoker   • Smokeless tobacco: Never Used   • Alcohol use No      Comment: occasional   • Drug use: No   • Sexual activity: Not on file     Other Topics Concern   • Not on file     Social History Narrative   • No narrative on file     Allergies   Allergen Reactions   • Bloodless    • Codeine Vomiting   • Tramadol Vomiting and Nausea       Current Outpatient Prescriptions   Medication Sig Dispense Refill   • rivaroxaban (XARELTO) 20 MG Tab tablet Take 1 Tab by mouth with dinner. 30 Tab 6   • allopurinol (ZYLOPRIM) 300 MG Tab Take 1 Tab by mouth every day. 30 Tab 3   • metoprolol SR (TOPROL XL) 25 MG TABLET SR 24 HR TAKE 1 TABLET BY MOUTH EVERY DAY 90 Tab 3   • Magnesium 500 MG Tab Take 1 Cap by mouth every day.     • metoprolol SR (TOPROL XL) 25 MG TABLET SR 24 HR        No current facility-administered medications for this visit.        Review of Systems   Constitutional: Positive for malaise/fatigue. Negative for chills, fever and weight loss.   HENT: Negative for congestion, sore throat and tinnitus.    Eyes: Negative for blurred vision and double vision.   Respiratory: Negative for cough, hemoptysis, sputum production, shortness of breath, wheezing and stridor.    Cardiovascular: Positive for palpitations. Negative  for chest pain, orthopnea, leg swelling and PND.   Gastrointestinal: Negative for abdominal pain, blood in stool, diarrhea, heartburn, nausea and vomiting.   Genitourinary: Negative.    Musculoskeletal: Negative.    Skin: Negative for rash.   Neurological: Negative for dizziness, tingling, tremors, sensory change, speech change, focal weakness, loss of consciousness, weakness and headaches.   Psychiatric/Behavioral: Negative.      All others systems reviewed and negative.     Objective:     Blood pressure 124/80, pulse 62, weight 121.6 kg (268 lb), SpO2 95 %. Body mass index is 39.56 kg/m².    Physical Exam   Constitutional: He is oriented to person, place, and time and well-developed, well-nourished, and in no distress.   HENT:   Head: Normocephalic and atraumatic.   Eyes: Pupils are equal, round, and reactive to light. Conjunctivae and EOM are normal.   Neck: Normal range of motion. Neck supple. No JVD present. No tracheal deviation present.   Cardiovascular: Normal heart sounds and intact distal pulses.  An irregularly irregular rhythm present. Tachycardia present.  Exam reveals no gallop and no friction rub.    No murmur heard.  Pulses:       Radial pulses are 2+ on the right side, and 2+ on the left side.        Dorsalis pedis pulses are 2+ on the right side, and 2+ on the left side.        Posterior tibial pulses are 2+ on the right side, and 2+ on the left side.   Pulmonary/Chest: Effort normal and breath sounds normal. No respiratory distress. He has no wheezes. He has no rales. He exhibits no tenderness.   Abdominal: Soft. Bowel sounds are normal.   Musculoskeletal: Normal range of motion. He exhibits no edema.   Neurological: He is alert and oriented to person, place, and time.   Skin: Skin is warm and dry.   Psychiatric: Mood, memory, affect and judgment normal.         Cardiac Imaging and Procedures Review:    EKG dated 9/13/18:   V paced, Afib, rate 137.     Echo dated 8/23/17:   CONCLUSIONS  .Normal  left ventricular systolic function.  Left ventricular ejection fraction is visually estimated to be 55%.  Normal diastolic function.  Mildly dilated right ventricle.  The right atrium is normal in size.  The left atrium is normal in size.  Structurally normal mitral valve without significant stenosis or   regurgitation.  Structurally normal aortic valve without significant stenosis or   regurgitation.  Estimated right ventricular systolic pressure  is 30 mmHg.  Compared to the prior echo of 4/14/ 2014, the EF is mildly improved    Nuclear Perfusion Imaging PET (10/6/16):   CONCLUSIONS AND IMPRESSIONS:  Homogenous uptake of tracer with mild   nonreversible ischemia in the mid inferior septal distribution, likely   artifact.  Conclusion, global hypokinesis with no inducible ischemia.    Labs (personally reviewed and notable for):   Lab Results   Component Value Date/Time    SODIUM 137 11/27/2017 01:00 PM    POTASSIUM 4.2 11/27/2017 01:00 PM    CHLORIDE 101 11/27/2017 01:00 PM    CO2 26 11/27/2017 01:00 PM    GLUCOSE 113 (H) 11/27/2017 01:00 PM    BUN 14 11/27/2017 01:00 PM    CREATININE 0.99 11/27/2017 01:00 PM      Lab Results   Component Value Date/Time    WBC 12.4 (H) 11/27/2017 01:00 PM    RBC 5.07 11/27/2017 01:00 PM    HEMOGLOBIN 15.6 11/27/2017 01:00 PM    HEMATOCRIT 45.6 11/27/2017 01:00 PM    MCV 89.9 11/27/2017 01:00 PM    MCH 30.8 11/27/2017 01:00 PM    MCHC 34.2 11/27/2017 01:00 PM    MPV 10.4 11/27/2017 01:00 PM    NEUTSPOLYS 80.40 (H) 11/27/2017 01:00 PM    LYMPHOCYTES 11.20 (L) 11/27/2017 01:00 PM    MONOCYTES 7.60 11/27/2017 01:00 PM    EOSINOPHILS 0.40 11/27/2017 01:00 PM    BASOPHILS 0.20 11/27/2017 01:00 PM      PT/INR:   Lab Results   Component Value Date/Time    PROTHROMBTM 13.4 11/27/2017 01:00 PM    INR 1.04 11/27/2017 01:00 PM   ]      Assessment:     1. Paroxysmal atrial fibrillation (HCC)  EKG   2. Chronic anticoagulation     3. S/P ablation of atrial fibrillation     4. ARCHANA (obstructive  sleep apnea)         Medical Decision Making:  Today's Assessment / Status / Plan:   - Patent with return of atrial fibrillation.  He has had episodes of a fib since 9/8/18. And burden currently at 54%.  His ventricular rate is elevated today though has slowed down some during our appt time.  He is relatively asymptomatic at this time except for increased fatigue.    - I have discussed options with him to include ARACELI guided cardioversion vs consideration for starting back on Tikosyn vs potential consideration for repeat ablation if A fib continues to be a problem for him.  He is most interested in procedures over medications as he has had previously medications failures with Digoxin, Multaq, and even significant breakthrough on Tikosyn.  He cannot remember why he did not tolerate multaq or digoxin.   - Dr. Faith has an opening tomorrow and he will meet with him and discuss whether or not he should consider restarting Tikosyn vs repeat ablation vs cardioversion, though I am unsure if cardioversion would hold without antiarrhythmic meds.  He has a history of depressed LVEF (with recovery to 55% a year ago on echo) so I would defer to Dr. Faith whether could consider flecainide or not.   - He will increase his Toprol to BID for now for attempt for improved rate control.     Plan reviewed in detail with the patient and he verbalizes understanding and is in agreement.   RTC tomorrow as scheduled, sooner if clinical condition changes  Collaborating MD/ADD: ALEX Kemp, D.OMaryuri  [address]  [facility] [address]  VIA Facsimile: [phone]

## 2018-09-13 NOTE — TELEPHONE ENCOUNTER
Pt was exercising at the gym went he went back into afib, 's on exercise monitor while exercising, pt does not have home pulse ox at home or any other way of checking pulse or BP, has been getting lightheaded at times today with it. Per EA, restart xarelto 20mg, rx sent in, pt out, pt did take asa earlier today, Pt may take extra toprol tonight if still feels like hes in afib, not lightheaded and BP pulse ok, [facility]  x[facility]to, pt needs PMC tomorrow per ADRIENNE, JH ordered cell phone adapter tonight, plan appt 230 pmc tomorrow, Tana KISER aware, unable to put on Terris schedule tomorrow to ROBERTA And MD dunbar for pmc 230.

## 2018-09-13 NOTE — TELEPHONE ENCOUNTER
----- Message from Madiha Quiles sent at 9/12/2018  4:53 PM PDT -----  Regarding: feeling dizzy and back in A-fib  ADRIENNE/Misty      Patient is feeling dizzy and back in A-fib. He recently had ablation. He can be reached at [phone].

## 2018-09-14 ENCOUNTER — OFFICE VISIT (OUTPATIENT)
Dept: CARDIOLOGY | Facility: MEDICAL CENTER | Age: 58
End: 2018-09-14
Payer: COMMERCIAL

## 2018-09-14 VITALS
WEIGHT: 268 LBS | BODY MASS INDEX: 39.69 KG/M2 | OXYGEN SATURATION: 95 % | HEART RATE: 86 BPM | HEIGHT: 69 IN | DIASTOLIC BLOOD PRESSURE: 74 MMHG | SYSTOLIC BLOOD PRESSURE: 126 MMHG

## 2018-09-14 DIAGNOSIS — Z95.0 CARDIAC PACEMAKER IN SITU: ICD-10-CM

## 2018-09-14 DIAGNOSIS — Z98.890 S/P ABLATION OF ATRIAL FIBRILLATION: ICD-10-CM

## 2018-09-14 DIAGNOSIS — Z79.01 CHRONIC ANTICOAGULATION: ICD-10-CM

## 2018-09-14 DIAGNOSIS — Z86.79 S/P ABLATION OF ATRIAL FIBRILLATION: ICD-10-CM

## 2018-09-14 PROCEDURE — 99214 OFFICE O/P EST MOD 30 MIN: CPT | Performed by: INTERNAL MEDICINE

## 2018-09-15 NOTE — PROGRESS NOTES
"Arrhythmia Clinic Note (Established patient)    DOS: 9/14/2018    Chief complaint/Reason for consult: persistent AF    Interval History:  Patient is a 59 yo M with history of PPM, AF s/p ablation of said AF almost 2 years ago with Ry. Subsequently weaned off OAC and his antiarrhythmic (dofetilide). Prior to ablation his AAD options including Tikosyn and Multaq were not effective he says. He noticed palpitations recently and on device check he has recently gone back into persistent AF.    ROS (+ highlighted in red):  Constitutional: Fevers/chills/fatigue/weightloss  Respiratory: Shortness of breath/cough  Cardiovascular: Chest pain/palpitations/edema/orthopnea/syncope    Past Medical History:   Diagnosis Date   • A-fib ([facility])    • At risk for sleep apnea    • Cardiac asystole ([facility]) 12/20/16    possible vaso-vagal   • Cardiac pacemaker in situ 12/20/2016   • Gout    • Pneumonia 2000   • Sleep apnea     CPAP       Allergies   Allergen Reactions   • Bloodless    • Codeine Vomiting   • Tramadol Vomiting and Nausea       Current Outpatient Prescriptions   Medication Sig Dispense Refill   • rivaroxaban (XARELTO) 20 MG Tab tablet Take 1 Tab by mouth with dinner. 30 Tab 6   • allopurinol (ZYLOPRIM) 300 MG Tab Take 1 Tab by mouth every day. 30 Tab 3   • metoprolol SR (TOPROL XL) 25 MG TABLET SR 24 HR TAKE 1 TABLET BY MOUTH EVERY DAY 90 Tab 3   • Magnesium 500 MG Tab Take 1 Cap by mouth every day.     • metoprolol SR (TOPROL XL) 25 MG TABLET SR 24 HR        No current facility-administered medications for this visit.        Physical Exam:  Vitals:    09/14/18 1519   BP: 126/74   Pulse: 86   SpO2: 95%   Weight: 121.6 kg (268 lb)   Height: 1.753 m (5' 9.02\")     General appearance: NAD, conversant   Eyes: anicteric sclerae, moist conjunctivae; no lid-lag; PERRLA  HENT: Atraumatic; oropharynx clear with moist mucous membranes and no mucosal ulcerations; normal hard and soft palate  Neck: Trachea midline; FROM, supple, no " thyromegaly or lymphadenopathy  Lungs: CTA, with normal respiratory effort and no intercostal retractions  CV: irregularly irregular, no MRGs, no JVD  Abdomen: Soft, non-tender; no masses or HSM  Extremities: No peripheral edema or extremity lymphadenopathy  Skin: Normal temperature, turgor and texture; no rash, ulcers or subcutaneous nodules  Psych: Appropriate affect, alert and oriented to person, place and time    Data:  Labs reviewed    Prior echo/stress reviewed:  LVEF 55%    Device interrogation reviewed, functioning appropriately, persistent AF, rates are not good    Impression/Plan:  1. S/P ablation of atrial fibrillation     2. Cardiac pacemaker in situ     3. Chronic anticoagulation       -Did well after initial AF ablation for almost 2 years, a lot of which was off antiarrhythmics  -One or more veins may have reconnected  -I think it is reasonable to consider repeat ablation, re-isolate veins if necessary and go after the posterior wall  -I will keep him afterwards for dofetilide loading  -Continue BB/xarelto in the mean time  -Risks/benefits/alternatives to this strategy were discussed, all his questions were answered, will proceed  -He is a [employer], no blood products  -Long term may be a good candidate for JESSICA closure    Alexi Faith MD

## 2018-09-18 LAB — EKG IMPRESSION: NORMAL

## 2018-10-08 DIAGNOSIS — I48.91 ATRIAL FIBRILLATION, UNSPECIFIED TYPE (HCC): ICD-10-CM

## 2018-10-08 RX ORDER — METOPROLOL SUCCINATE 25 MG/1
25 TABLET, EXTENDED RELEASE ORAL
Qty: 90 TAB | Refills: 3 | Status: SHIPPED | OUTPATIENT
Start: 2018-10-08 | End: 2018-10-09 | Stop reason: SDUPTHER

## 2018-10-09 DIAGNOSIS — I48.91 ATRIAL FIBRILLATION, UNSPECIFIED TYPE (HCC): ICD-10-CM

## 2018-10-09 RX ORDER — METOPROLOL SUCCINATE 25 MG/1
25 TABLET, EXTENDED RELEASE ORAL 2 TIMES DAILY
Qty: 60 TAB | Refills: 6 | Status: SHIPPED | OUTPATIENT
Start: 2018-10-09 | End: 2019-06-24 | Stop reason: SDUPTHER

## 2018-10-09 NOTE — PROGRESS NOTES
"Spoke w/pt and verified how he is currently taking based on recent visits w/EA and SS.     \"He will increase his Toprol to BID for now for attempt for improved rate control.\"    Rx updated.  "

## 2018-11-26 DIAGNOSIS — Z01.810 PRE-OPERATIVE CARDIOVASCULAR EXAMINATION: ICD-10-CM

## 2018-11-26 DIAGNOSIS — Z01.812 PRE-PROCEDURAL LABORATORY EXAMINATION: ICD-10-CM

## 2018-11-26 LAB
ALBUMIN SERPL BCP-MCNC: 4.4 G/DL (ref 3.2–4.9)
ALBUMIN/GLOB SERPL: 1.6 G/DL
ALP SERPL-CCNC: 73 U/L (ref 30–99)
ALT SERPL-CCNC: 22 U/L (ref 2–50)
ANION GAP SERPL CALC-SCNC: 9 MMOL/L (ref 0–11.9)
AST SERPL-CCNC: 15 U/L (ref 12–45)
BASOPHILS # BLD AUTO: 0.6 % (ref 0–1.8)
BASOPHILS # BLD: 0.05 K/UL (ref 0–0.12)
BILIRUB SERPL-MCNC: 0.6 MG/DL (ref 0.1–1.5)
BUN SERPL-MCNC: 16 MG/DL (ref 8–22)
CALCIUM SERPL-MCNC: 9.7 MG/DL (ref 8.5–10.5)
CHLORIDE SERPL-SCNC: 105 MMOL/L (ref 96–112)
CO2 SERPL-SCNC: 26 MMOL/L (ref 20–33)
CREAT SERPL-MCNC: 1.05 MG/DL (ref 0.5–1.4)
EKG IMPRESSION: NORMAL
EOSINOPHIL # BLD AUTO: 0.12 K/UL (ref 0–0.51)
EOSINOPHIL NFR BLD: 1.3 % (ref 0–6.9)
ERYTHROCYTE [DISTWIDTH] IN BLOOD BY AUTOMATED COUNT: 44.4 FL (ref 35.9–50)
GLOBULIN SER CALC-MCNC: 2.8 G/DL (ref 1.9–3.5)
GLUCOSE SERPL-MCNC: 91 MG/DL (ref 65–99)
HCT VFR BLD AUTO: 48.6 % (ref 42–52)
HGB BLD-MCNC: 16.2 G/DL (ref 14–18)
IMM GRANULOCYTES # BLD AUTO: 0.04 K/UL (ref 0–0.11)
IMM GRANULOCYTES NFR BLD AUTO: 0.4 % (ref 0–0.9)
INR PPP: 1.18 (ref 0.87–1.13)
LYMPHOCYTES # BLD AUTO: 1.98 K/UL (ref 1–4.8)
LYMPHOCYTES NFR BLD: 21.8 % (ref 22–41)
MCH RBC QN AUTO: 31 PG (ref 27–33)
MCHC RBC AUTO-ENTMCNC: 33.3 G/DL (ref 33.7–35.3)
MCV RBC AUTO: 92.9 FL (ref 81.4–97.8)
MONOCYTES # BLD AUTO: 0.64 K/UL (ref 0–0.85)
MONOCYTES NFR BLD AUTO: 7 % (ref 0–13.4)
NEUTROPHILS # BLD AUTO: 6.26 K/UL (ref 1.82–7.42)
NEUTROPHILS NFR BLD: 68.9 % (ref 44–72)
NRBC # BLD AUTO: 0 K/UL
NRBC BLD-RTO: 0 /100 WBC
PLATELET # BLD AUTO: 218 K/UL (ref 164–446)
PMV BLD AUTO: 11.3 FL (ref 9–12.9)
POTASSIUM SERPL-SCNC: 4.3 MMOL/L (ref 3.6–5.5)
PROT SERPL-MCNC: 7.2 G/DL (ref 6–8.2)
PROTHROMBIN TIME: 15.2 SEC (ref 12–14.6)
RBC # BLD AUTO: 5.23 M/UL (ref 4.7–6.1)
SODIUM SERPL-SCNC: 140 MMOL/L (ref 135–145)
WBC # BLD AUTO: 9.1 K/UL (ref 4.8–10.8)

## 2018-11-26 PROCEDURE — 80053 COMPREHEN METABOLIC PANEL: CPT

## 2018-11-26 PROCEDURE — 36415 COLL VENOUS BLD VENIPUNCTURE: CPT

## 2018-11-26 PROCEDURE — 85025 COMPLETE CBC W/AUTO DIFF WBC: CPT

## 2018-11-26 PROCEDURE — 93010 ELECTROCARDIOGRAM REPORT: CPT | Performed by: INTERNAL MEDICINE

## 2018-11-26 PROCEDURE — 93005 ELECTROCARDIOGRAM TRACING: CPT

## 2018-11-26 PROCEDURE — 85610 PROTHROMBIN TIME: CPT

## 2018-11-27 ENCOUNTER — HOSPITAL ENCOUNTER (INPATIENT)
Facility: MEDICAL CENTER | Age: 58
LOS: 3 days | DRG: 274 | End: 2018-11-30
Attending: INTERNAL MEDICINE | Admitting: INTERNAL MEDICINE
Payer: MEDICARE

## 2018-11-27 ENCOUNTER — APPOINTMENT (OUTPATIENT)
Dept: CARDIOLOGY | Facility: MEDICAL CENTER | Age: 58
DRG: 274 | End: 2018-11-27
Attending: INTERNAL MEDICINE
Payer: MEDICARE

## 2018-11-27 DIAGNOSIS — I48.20 CHRONIC ATRIAL FIBRILLATION (HCC): ICD-10-CM

## 2018-11-27 LAB
ALBUMIN SERPL BCP-MCNC: 3.4 G/DL (ref 3.2–4.9)
ALBUMIN/GLOB SERPL: 1.4 G/DL
ALP SERPL-CCNC: 59 U/L (ref 30–99)
ALT SERPL-CCNC: 16 U/L (ref 2–50)
ANION GAP SERPL CALC-SCNC: 11 MMOL/L (ref 0–11.9)
AST SERPL-CCNC: 18 U/L (ref 12–45)
BILIRUB SERPL-MCNC: 0.6 MG/DL (ref 0.1–1.5)
BUN SERPL-MCNC: 19 MG/DL (ref 8–22)
CALCIUM SERPL-MCNC: 7.8 MG/DL (ref 8.5–10.5)
CHLORIDE SERPL-SCNC: 109 MMOL/L (ref 96–112)
CO2 SERPL-SCNC: 19 MMOL/L (ref 20–33)
CREAT SERPL-MCNC: 1.08 MG/DL (ref 0.5–1.4)
EKG IMPRESSION: NORMAL
EKG IMPRESSION: NORMAL
GLOBULIN SER CALC-MCNC: 2.4 G/DL (ref 1.9–3.5)
GLUCOSE SERPL-MCNC: 174 MG/DL (ref 65–99)
LV EJECT FRACT  99904: 55
MAGNESIUM SERPL-MCNC: 1.8 MG/DL (ref 1.5–2.5)
POTASSIUM SERPL-SCNC: 3.9 MMOL/L (ref 3.6–5.5)
PROT SERPL-MCNC: 5.8 G/DL (ref 6–8.2)
SODIUM SERPL-SCNC: 139 MMOL/L (ref 135–145)

## 2018-11-27 PROCEDURE — 93005 ELECTROCARDIOGRAM TRACING: CPT | Performed by: INTERNAL MEDICINE

## 2018-11-27 PROCEDURE — C1894 INTRO/SHEATH, NON-LASER: HCPCS

## 2018-11-27 PROCEDURE — 93325 DOPPLER ECHO COLOR FLOW MAPG: CPT

## 2018-11-27 PROCEDURE — A9270 NON-COVERED ITEM OR SERVICE: HCPCS | Performed by: INTERNAL MEDICINE

## 2018-11-27 PROCEDURE — C1732 CATH, EP, DIAG/ABL, 3D/VECT: HCPCS

## 2018-11-27 PROCEDURE — 02583ZZ DESTRUCTION OF CONDUCTION MECHANISM, PERCUTANEOUS APPROACH: ICD-10-PCS | Performed by: INTERNAL MEDICINE

## 2018-11-27 PROCEDURE — 360995 HCHG BAYLIS TRANSSEPTAL NEEDLE

## 2018-11-27 PROCEDURE — 160002 HCHG RECOVERY MINUTES (STAT)

## 2018-11-27 PROCEDURE — C1759 CATH, INTRA ECHOCARDIOGRAPHY: HCPCS

## 2018-11-27 PROCEDURE — 83735 ASSAY OF MAGNESIUM: CPT

## 2018-11-27 PROCEDURE — 700102 HCHG RX REV CODE 250 W/ 637 OVERRIDE(OP): Performed by: INTERNAL MEDICINE

## 2018-11-27 PROCEDURE — 305545 HCHG DOUBLE TRANSDUCER

## 2018-11-27 PROCEDURE — 80053 COMPREHEN METABOLIC PANEL: CPT

## 2018-11-27 PROCEDURE — 360980 HCHG SINGLE TRANSDUCER

## 2018-11-27 PROCEDURE — 305387 HCHG SUTURES

## 2018-11-27 PROCEDURE — 93010 ELECTROCARDIOGRAM REPORT: CPT | Performed by: INTERNAL MEDICINE

## 2018-11-27 PROCEDURE — 304952 HCHG R 2 PADS

## 2018-11-27 PROCEDURE — 93010 ELECTROCARDIOGRAM REPORT: CPT | Mod: 76 | Performed by: INTERNAL MEDICINE

## 2018-11-27 PROCEDURE — 700111 HCHG RX REV CODE 636 W/ 250 OVERRIDE (IP): Performed by: INTERNAL MEDICINE

## 2018-11-27 PROCEDURE — 770020 HCHG ROOM/CARE - TELE (206)

## 2018-11-27 PROCEDURE — 93613 INTRACARDIAC EPHYS 3D MAPG: CPT

## 2018-11-27 PROCEDURE — 93657 TX L/R ATRIAL FIB ADDL: CPT

## 2018-11-27 PROCEDURE — 02K83ZZ MAP CONDUCTION MECHANISM, PERCUTANEOUS APPROACH: ICD-10-PCS | Performed by: INTERNAL MEDICINE

## 2018-11-27 PROCEDURE — 93662 INTRACARDIAC ECG (ICE): CPT

## 2018-11-27 PROCEDURE — C1730 CATH, EP, 19 OR FEW ELECT: HCPCS

## 2018-11-27 PROCEDURE — 700111 HCHG RX REV CODE 636 W/ 250 OVERRIDE (IP)

## 2018-11-27 PROCEDURE — 305383 HCHG CARTO3 REF PATCH

## 2018-11-27 PROCEDURE — B246ZZZ ULTRASONOGRAPHY OF RIGHT AND LEFT HEART: ICD-10-PCS | Performed by: INTERNAL MEDICINE

## 2018-11-27 PROCEDURE — 85347 COAGULATION TIME ACTIVATED: CPT

## 2018-11-27 PROCEDURE — 700101 HCHG RX REV CODE 250

## 2018-11-27 PROCEDURE — 00537 ANES CARDIAC EP PROCEDURES: CPT

## 2018-11-27 PROCEDURE — 93656 COMPRE EP EVAL ABLTJ ATR FIB: CPT

## 2018-11-27 PROCEDURE — 93623 PRGRMD STIMJ&PACG IV RX NFS: CPT

## 2018-11-27 PROCEDURE — C1893 INTRO/SHEATH, FIXED,NON-PEEL: HCPCS

## 2018-11-27 RX ORDER — THIAMINE HCL 100 MG
1 TABLET ORAL DAILY
Status: DISCONTINUED | OUTPATIENT
Start: 2018-11-27 | End: 2018-11-27

## 2018-11-27 RX ORDER — HEPARIN SODIUM,PORCINE 1000/ML
VIAL (ML) INJECTION
Status: COMPLETED
Start: 2018-11-27 | End: 2018-11-27

## 2018-11-27 RX ORDER — ONDANSETRON 2 MG/ML
4 INJECTION INTRAMUSCULAR; INTRAVENOUS
Status: DISCONTINUED | OUTPATIENT
Start: 2018-11-27 | End: 2018-11-27 | Stop reason: HOSPADM

## 2018-11-27 RX ORDER — OXYCODONE HYDROCHLORIDE 10 MG/1
10 TABLET ORAL
Status: DISCONTINUED | OUTPATIENT
Start: 2018-11-27 | End: 2018-11-27 | Stop reason: HOSPADM

## 2018-11-27 RX ORDER — SODIUM CHLORIDE 9 MG/ML
INJECTION, SOLUTION INTRAVENOUS
Status: ACTIVE
Start: 2018-11-27 | End: 2018-11-28

## 2018-11-27 RX ORDER — OXYCODONE HYDROCHLORIDE 5 MG/1
5 TABLET ORAL
Status: DISCONTINUED | OUTPATIENT
Start: 2018-11-27 | End: 2018-11-27 | Stop reason: HOSPADM

## 2018-11-27 RX ORDER — HALOPERIDOL 5 MG/ML
1 INJECTION INTRAMUSCULAR
Status: DISCONTINUED | OUTPATIENT
Start: 2018-11-27 | End: 2018-11-27 | Stop reason: HOSPADM

## 2018-11-27 RX ORDER — OMEPRAZOLE 20 MG/1
20 CAPSULE, DELAYED RELEASE ORAL
Status: DISCONTINUED | OUTPATIENT
Start: 2018-11-27 | End: 2018-11-30 | Stop reason: HOSPADM

## 2018-11-27 RX ORDER — PROTAMINE SULFATE 10 MG/ML
INJECTION, SOLUTION INTRAVENOUS
Status: DISPENSED
Start: 2018-11-27 | End: 2018-11-27

## 2018-11-27 RX ORDER — METOPROLOL SUCCINATE 25 MG/1
25 TABLET, EXTENDED RELEASE ORAL 2 TIMES DAILY
Status: DISCONTINUED | OUTPATIENT
Start: 2018-11-27 | End: 2018-11-30 | Stop reason: HOSPADM

## 2018-11-27 RX ORDER — ALLOPURINOL 300 MG/1
300 TABLET ORAL DAILY
Status: DISCONTINUED | OUTPATIENT
Start: 2018-11-27 | End: 2018-11-30 | Stop reason: HOSPADM

## 2018-11-27 RX ORDER — MEPERIDINE HYDROCHLORIDE 25 MG/ML
6.25 INJECTION INTRAMUSCULAR; INTRAVENOUS; SUBCUTANEOUS
Status: DISCONTINUED | OUTPATIENT
Start: 2018-11-27 | End: 2018-11-27 | Stop reason: HOSPADM

## 2018-11-27 RX ORDER — HYDROMORPHONE HYDROCHLORIDE 2 MG/ML
0.1 INJECTION, SOLUTION INTRAMUSCULAR; INTRAVENOUS; SUBCUTANEOUS
Status: DISCONTINUED | OUTPATIENT
Start: 2018-11-27 | End: 2018-11-27 | Stop reason: HOSPADM

## 2018-11-27 RX ORDER — ACETAMINOPHEN 325 MG/1
650 TABLET ORAL EVERY 6 HOURS PRN
Status: DISCONTINUED | OUTPATIENT
Start: 2018-11-27 | End: 2018-11-30 | Stop reason: HOSPADM

## 2018-11-27 RX ORDER — MAGNESIUM SULFATE HEPTAHYDRATE 40 MG/ML
2 INJECTION, SOLUTION INTRAVENOUS ONCE
Status: COMPLETED | OUTPATIENT
Start: 2018-11-27 | End: 2018-11-27

## 2018-11-27 RX ORDER — ISOPROTERENOL HYDROCHLORIDE 0.2 MG/ML
INJECTION, SOLUTION INTRAVENOUS
Status: COMPLETED
Start: 2018-11-27 | End: 2018-11-27

## 2018-11-27 RX ORDER — POTASSIUM CHLORIDE 20 MEQ/1
40 TABLET, EXTENDED RELEASE ORAL ONCE
Status: COMPLETED | OUTPATIENT
Start: 2018-11-27 | End: 2018-11-27

## 2018-11-27 RX ORDER — OXYCODONE HCL 5 MG/5 ML
10 SOLUTION, ORAL ORAL
Status: DISCONTINUED | OUTPATIENT
Start: 2018-11-27 | End: 2018-11-27 | Stop reason: HOSPADM

## 2018-11-27 RX ORDER — SODIUM CHLORIDE, SODIUM LACTATE, POTASSIUM CHLORIDE, CALCIUM CHLORIDE 600; 310; 30; 20 MG/100ML; MG/100ML; MG/100ML; MG/100ML
INJECTION, SOLUTION INTRAVENOUS CONTINUOUS
Status: DISCONTINUED | OUTPATIENT
Start: 2018-11-27 | End: 2018-11-30 | Stop reason: HOSPADM

## 2018-11-27 RX ORDER — LIDOCAINE HYDROCHLORIDE 40 MG/ML
SOLUTION TOPICAL
Status: DISPENSED
Start: 2018-11-27 | End: 2018-11-27

## 2018-11-27 RX ORDER — HYDROMORPHONE HYDROCHLORIDE 2 MG/ML
0.4 INJECTION, SOLUTION INTRAMUSCULAR; INTRAVENOUS; SUBCUTANEOUS
Status: DISCONTINUED | OUTPATIENT
Start: 2018-11-27 | End: 2018-11-27 | Stop reason: HOSPADM

## 2018-11-27 RX ORDER — SUCRALFATE 1 G/1
1 TABLET ORAL
Status: DISCONTINUED | OUTPATIENT
Start: 2018-11-27 | End: 2018-11-30 | Stop reason: HOSPADM

## 2018-11-27 RX ORDER — ONDANSETRON 2 MG/ML
INJECTION INTRAMUSCULAR; INTRAVENOUS
Status: COMPLETED
Start: 2018-11-27 | End: 2018-11-27

## 2018-11-27 RX ORDER — KETOROLAC TROMETHAMINE 30 MG/ML
30 INJECTION, SOLUTION INTRAMUSCULAR; INTRAVENOUS EVERY 6 HOURS
Status: DISCONTINUED | OUTPATIENT
Start: 2018-11-27 | End: 2018-11-30 | Stop reason: HOSPADM

## 2018-11-27 RX ORDER — HYDROMORPHONE HYDROCHLORIDE 2 MG/ML
0.2 INJECTION, SOLUTION INTRAMUSCULAR; INTRAVENOUS; SUBCUTANEOUS
Status: DISCONTINUED | OUTPATIENT
Start: 2018-11-27 | End: 2018-11-27 | Stop reason: HOSPADM

## 2018-11-27 RX ORDER — COLCHICINE 0.6 MG/1
0.6 TABLET ORAL 2 TIMES DAILY
Status: DISCONTINUED | OUTPATIENT
Start: 2018-11-27 | End: 2018-11-30 | Stop reason: HOSPADM

## 2018-11-27 RX ORDER — OXYCODONE HCL 5 MG/5 ML
5 SOLUTION, ORAL ORAL
Status: DISCONTINUED | OUTPATIENT
Start: 2018-11-27 | End: 2018-11-27 | Stop reason: HOSPADM

## 2018-11-27 RX ORDER — DOFETILIDE 0.5 MG/1
500 CAPSULE ORAL EVERY 12 HOURS
Status: DISCONTINUED | OUTPATIENT
Start: 2018-11-27 | End: 2018-11-30 | Stop reason: HOSPADM

## 2018-11-27 RX ADMIN — HEPARIN SODIUM 4000 UNITS: 1000 INJECTION, SOLUTION INTRAVENOUS; SUBCUTANEOUS at 11:02

## 2018-11-27 RX ADMIN — POTASSIUM CHLORIDE 40 MEQ: 1500 TABLET, EXTENDED RELEASE ORAL at 17:18

## 2018-11-27 RX ADMIN — DOFETILIDE 500 MCG: 0.5 CAPSULE ORAL at 15:54

## 2018-11-27 RX ADMIN — RIVAROXABAN 20 MG: 20 TABLET, FILM COATED ORAL at 17:19

## 2018-11-27 RX ADMIN — MAGNESIUM SULFATE IN WATER 2 G: 40 INJECTION, SOLUTION INTRAVENOUS at 16:16

## 2018-11-27 RX ADMIN — SUCRALFATE 1 G: 1 TABLET ORAL at 20:02

## 2018-11-27 RX ADMIN — METOPROLOL SUCCINATE 25 MG: 25 TABLET, EXTENDED RELEASE ORAL at 17:19

## 2018-11-27 RX ADMIN — HEPARIN SODIUM 2000 UNITS: 200 INJECTION, SOLUTION INTRAVENOUS at 08:25

## 2018-11-27 RX ADMIN — ISOPROTERENOL HYDROCHLORIDE 200 MCG: 0.2 INJECTION, SOLUTION INTRACARDIAC; INTRAMUSCULAR; INTRAVENOUS; SUBCUTANEOUS at 11:01

## 2018-11-27 RX ADMIN — ONDANSETRON HYDROCHLORIDE 4 MG: 2 INJECTION, SOLUTION INTRAMUSCULAR; INTRAVENOUS at 12:00

## 2018-11-27 RX ADMIN — KETOROLAC TROMETHAMINE 30 MG: 30 INJECTION, SOLUTION INTRAMUSCULAR at 17:19

## 2018-11-27 RX ADMIN — COLCHICINE 0.6 MG: 0.6 TABLET, FILM COATED ORAL at 18:00

## 2018-11-27 RX ADMIN — HEPARIN SODIUM 10000 UNITS: 1000 INJECTION, SOLUTION INTRAVENOUS; SUBCUTANEOUS at 11:00

## 2018-11-27 RX ADMIN — OMEPRAZOLE 20 MG: 20 CAPSULE, DELAYED RELEASE ORAL at 20:02

## 2018-11-27 ASSESSMENT — COGNITIVE AND FUNCTIONAL STATUS - GENERAL
MOBILITY SCORE: 24
DAILY ACTIVITIY SCORE: 24
SUGGESTED CMS G CODE MODIFIER DAILY ACTIVITY: CH
SUGGESTED CMS G CODE MODIFIER MOBILITY: CH

## 2018-11-27 ASSESSMENT — PAIN SCALES - GENERAL
PAINLEVEL_OUTOF10: 0

## 2018-11-27 ASSESSMENT — PATIENT HEALTH QUESTIONNAIRE - PHQ9
2. FEELING DOWN, DEPRESSED, IRRITABLE, OR HOPELESS: NOT AT ALL
1. LITTLE INTEREST OR PLEASURE IN DOING THINGS: NOT AT ALL
SUM OF ALL RESPONSES TO PHQ9 QUESTIONS 1 AND 2: 0

## 2018-11-27 ASSESSMENT — LIFESTYLE VARIABLES: EVER_SMOKED: NEVER

## 2018-11-27 NOTE — PROGRESS NOTES
Skin assessment completed by 2 RN. Ears red and blanching.  Dressing to right wrist from A-Line. CD&I  Dressing to siria groin from insertion site, C,D &I and soft.No bleeding, hematoma noted.  No wounds noted.

## 2018-11-27 NOTE — OP REPORT
[facility] Electrophysiology Procedure Note    Procedure(s) Performed:   1) Atrial fibrillation ablation  2) Additional atrial fibrillation ablation (CFAEs along anterior wall)  3) Ablation of secondary mechanism of tachycardia (LA roof line and floor line)  4) 3D mapping  5) ICE during diagnostics and intervention  6) Arrhythmia induction with IV drug infusion  7) Periprocedural device programing    Indication(s):  Persistent AF    Physician(s): Alexi Faith M.D.     Resident/Assistant(s): None     Anesthesia: General endotracheal anesthetic.     Specimen(s) Removed: None     Estimated Blood Loss:  30cc     Complications:  None     Description of Procedure:   After informed written consent, the patient was brought to the EP lab in the fasting, non-sedated state. The patient was prepped and draped in the usual sterile fashion. ARACELI was done by anesthesia prior to procedure and will be dictated separately. Patient's device was tested and reprogrammed to VVI 40 for the duration of the case. Femoral venous access was obtained using the modified Seldinger technique. In the left femoral vein, 2 sheaths (6,8 Fr) were inserted over 0.035” guidewires. In the right femoral vein, 2 sheaths (8,8 Fr) were inserted over 0.035” guidewires. A deflectable decapolar catheter was advanced to the CS position. Baseline rhythm was atrial fibrillation, no evidence of organization. An intracardiac echo (ICE) catheter was advanced to the right atrium. ICE was used to identify the atrial septum, left atrial appendage, and pulmonary veins and hipolito the anatomic structures to superimpose on our 3D electroanatomic map using CARTOSound. During the procedure, ICE was utilized to localize the ablation catheter, monitor for thrombus formation, and to exclude pericardial effusion. Intravenous heparin was administered to maintain -350 seconds. Double transseptal left heart catheterization was performed under intracardiac echo and  hemodynamic guidance. A [place] needle was inserted into the 8 Fr sheaths ([employer] torflex) for transseptal puncture and placement of sheaths in the left atrium. Mapping of the pulmonary veins and left atrium was performed using CARTO3 FAM and a deflectable multipolar mapping catheter ([employer]). A FAM/voltage map was made initially showing active vein signals along the roof of the LSPV as well as the R becky. Repeat PVI was performed along the roof of the LSPV as well additional lesions along the coumadin ridge isolating the LSPV. Additional PVI was done starting from the R sided posterior wall just outside the becky, coming around the RIPV and up the anterior wall to the roof. We then performed another voltage map to verify PVI. Ablation was performed using an 3.5 mm deflectable irrigated force contact catheter ([employer]) at primarily 30 W power. Additionally we noticed numerous very high frequency signals along the posterior LA wall. Roof line and floor line of the LA was performed isolating the posterior wall. Patient remained in AF and additional high frequency/fractionated signals were identified and visualized using the CARTO ripple function along the anterior wall. Ablation at these sites again failed to terminate the AF. At this point patient was successfully cardioverted to sinus rhythm with 200J biphasic energy. We again checked for posterior wall and pulmonary vein isolation. We performed an EP study with up to double atrial stimuli down to AERP along with burst pacing down to 240 msec coupling interval with and without isuprel infusion at 2 mcg/min without inducible arrhythmia.     At the end of the procedure, heparin was reversed with protamine, the catheter and sheaths were removed, and hemostasis was achieved by manual compression. Device was reprogrammed to baseline parameters. Following recovery from anesthesia, the patient was transferred to the PPU in good condition.        Total ablation time: 2545 seconds    Fluoroscopy time: 10.7 minutes     Electrophysiologic Findings:    1. Sinus  694 ms, HV 42 ms. AVBCL = 280 ms. AERP 600/220 msec. AVNERP 600/320/220.    Impressions:    1. Persistent atrial fibrillation.    2. Successful RF ablation pulmonary vein isolation procedure.    3. Successful RF roof and floor line of LA  4. Additional RF ablation of anterior wall CFAEs     Recommendations:  1. Resume anticoagulation and restart Tikosyn.  2. Start colchicine 0.6 BID x 2 weeks, carafate x 2 week and daily PPI x 1 month.  3. Admit to monitored bedrest.

## 2018-11-27 NOTE — OR NURSING
1145 patient arrived from cath lab s/p a.fib ablation, right and left groin access sites, gauze and tegaderm for dressing clean dry intact. Patient sleeping able to wake on request answers questions approprietly, no s/s of discomfort or distress. Patient c/o nausea vomiting will medicate as ordered.   1240 post ekg done.  1420 criteria met to discharge patient out of pacu  1444 report given to hannah YU T708 bed 2 all questions answered.   1455 patient transported to room T708 bed 2 with all his personal belongings.  1510 checked surgical sites with RN maria de jesus no bleeding no hematoma, tele monitor on.

## 2018-11-27 NOTE — H&P
"EP Pre-procedure History and Physical    DOS: 11/27/2018    Chief complaint/Reason for consult: Persistent AF    Interval History:  Patient is a 57 yo with history of persistent AF s/p 2 prior AF ablations, back with symptomatic persistent AF. Here for re-do AF ablation.    ROS (+ highlighted in red):  Constitutional: Fevers/chills/fatigue/weightloss  Respiratory: Shortness of breath/cough  Cardiovascular: Chest pain/palpitations/edema/orthopnea/syncope    Past Medical History:   Diagnosis Date   • A-fib (HCC)    • At risk for sleep apnea    • Cardiac asystole (HCC) 12/20/16    possible vaso-vagal   • Cardiac pacemaker in situ 12/20/2016   • Gout    • Pneumonia 2000   • Sleep apnea     CPAP     Past Surgical History:   Procedure Laterality Date   • OTHER CARDIAC SURGERY  12/20/16    pacemaker   • OTHER ORTHOPEDIC SURGERY  6/1/1990    achilles tendon repair left foot     Family History   Problem Relation Age of Onset   • Other Mother    • Heart Disease Mother         atrial fib   • Stroke Father 65        CVa   • Diabetes Father    • Cancer Sister    • Stroke Paternal Grandmother        Allergies   Allergen Reactions   • Bloodless    • Codeine Vomiting   • Tramadol Vomiting and Nausea       Current Facility-Administered Medications   Medication Dose Route Frequency Provider Last Rate Last Dose   • FENTANYL CITRATE (PF) 0.05 MG/ML INJ SOLN            • SUGAMMADEX SODIUM 200 MG/2ML IV SOLN            • LIDOCAINE HCL 4 % MT SOLN            • HEPARIN (PORCINE) IN NACL 2-0.9 UNIT/ML-% INJ SOLN                Physical Exam:  Vitals:    11/27/18 0640   Weight: 120.7 kg (266 lb)   Height: 1.753 m (5' 9\")     General appearance: NAD, conversant   Eyes: anicteric sclerae, moist conjunctivae; no lid-lag; PERRLA  HENT: Atraumatic; oropharynx clear with moist mucous membranes and no mucosal ulcerations; normal hard and soft palate  Neck: Trachea midline; FROM, supple, no thyromegaly or lymphadenopathy  Lungs: CTA, with normal " respiratory effort and no intercostal retractions  CV: irregularly irregular, no MRGs, no JVD  Abdomen: Soft, non-tender; no masses or HSM  Extremities: No peripheral edema or extremity lymphadenopathy  Skin: Normal temperature, turgor and texture; no rash, ulcers or subcutaneous nodules  Psych: Appropriate affect, alert and oriented to person, place and time    Data:  Labs reviewed    Prior echo/stress reviewed:  LVEF 55%    EKG interpreted by me:  AF    Impression/Plan:  1)Persistent AF    -Risks/benefits/alternatives discussed, all questions answered  -proceed with AF ablation    Alexi Faith MD

## 2018-11-27 NOTE — PROGRESS NOTES
Received report from PPU Nurse, Hayder  Received patient to room via bed with staff. No distress noted. Family at bedside. Bedrest until 1545. Call bell and personal items in reach.

## 2018-11-28 LAB
ACT BLD: 252 SEC (ref 74–137)
ACT BLD: 296 SEC (ref 74–137)
ACT BLD: 307 SEC (ref 74–137)
ACT BLD: 307 SEC (ref 74–137)
ANION GAP SERPL CALC-SCNC: 6 MMOL/L (ref 0–11.9)
BUN SERPL-MCNC: 22 MG/DL (ref 8–22)
CALCIUM SERPL-MCNC: 8.5 MG/DL (ref 8.5–10.5)
CHLORIDE SERPL-SCNC: 107 MMOL/L (ref 96–112)
CO2 SERPL-SCNC: 26 MMOL/L (ref 20–33)
CREAT SERPL-MCNC: 1.18 MG/DL (ref 0.5–1.4)
EKG IMPRESSION: NORMAL
GLUCOSE SERPL-MCNC: 118 MG/DL (ref 65–99)
MAGNESIUM SERPL-MCNC: 2.6 MG/DL (ref 1.5–2.5)
POTASSIUM SERPL-SCNC: 4.7 MMOL/L (ref 3.6–5.5)
SODIUM SERPL-SCNC: 139 MMOL/L (ref 135–145)

## 2018-11-28 PROCEDURE — 700111 HCHG RX REV CODE 636 W/ 250 OVERRIDE (IP): Performed by: INTERNAL MEDICINE

## 2018-11-28 PROCEDURE — 99233 SBSQ HOSP IP/OBS HIGH 50: CPT | Performed by: NURSE PRACTITIONER

## 2018-11-28 PROCEDURE — 83735 ASSAY OF MAGNESIUM: CPT

## 2018-11-28 PROCEDURE — A9270 NON-COVERED ITEM OR SERVICE: HCPCS | Performed by: INTERNAL MEDICINE

## 2018-11-28 PROCEDURE — 80048 BASIC METABOLIC PNL TOTAL CA: CPT

## 2018-11-28 PROCEDURE — 93010 ELECTROCARDIOGRAM REPORT: CPT | Performed by: INTERNAL MEDICINE

## 2018-11-28 PROCEDURE — 93010 ELECTROCARDIOGRAM REPORT: CPT | Mod: 77 | Performed by: INTERNAL MEDICINE

## 2018-11-28 PROCEDURE — 700102 HCHG RX REV CODE 250 W/ 637 OVERRIDE(OP): Performed by: INTERNAL MEDICINE

## 2018-11-28 PROCEDURE — 93005 ELECTROCARDIOGRAM TRACING: CPT | Performed by: INTERNAL MEDICINE

## 2018-11-28 PROCEDURE — 770020 HCHG ROOM/CARE - TELE (206)

## 2018-11-28 PROCEDURE — 36415 COLL VENOUS BLD VENIPUNCTURE: CPT

## 2018-11-28 RX ORDER — DOFETILIDE 0.5 MG/1
500 CAPSULE ORAL EVERY 12 HOURS
Qty: 60 CAP | Refills: 3 | Status: SHIPPED | OUTPATIENT
Start: 2018-11-28 | End: 2018-11-30 | Stop reason: SDUPTHER

## 2018-11-28 RX ORDER — DOFETILIDE 0.5 MG/1
500 CAPSULE ORAL EVERY 12 HOURS
Qty: 60 CAP | Refills: 3 | Status: SHIPPED | OUTPATIENT
Start: 2018-11-28 | End: 2018-11-28

## 2018-11-28 RX ADMIN — COLCHICINE 0.6 MG: 0.6 TABLET, FILM COATED ORAL at 17:43

## 2018-11-28 RX ADMIN — ALLOPURINOL 300 MG: 300 TABLET ORAL at 05:07

## 2018-11-28 RX ADMIN — KETOROLAC TROMETHAMINE 30 MG: 30 INJECTION, SOLUTION INTRAMUSCULAR at 17:43

## 2018-11-28 RX ADMIN — OMEPRAZOLE 20 MG: 20 CAPSULE, DELAYED RELEASE ORAL at 09:12

## 2018-11-28 RX ADMIN — SUCRALFATE 1 G: 1 TABLET ORAL at 17:43

## 2018-11-28 RX ADMIN — SUCRALFATE 1 G: 1 TABLET ORAL at 09:11

## 2018-11-28 RX ADMIN — RIVAROXABAN 20 MG: 20 TABLET, FILM COATED ORAL at 17:43

## 2018-11-28 RX ADMIN — SUCRALFATE 1 G: 1 TABLET ORAL at 20:18

## 2018-11-28 RX ADMIN — DOFETILIDE 500 MCG: 0.5 CAPSULE ORAL at 05:08

## 2018-11-28 RX ADMIN — KETOROLAC TROMETHAMINE 30 MG: 30 INJECTION, SOLUTION INTRAMUSCULAR at 12:39

## 2018-11-28 RX ADMIN — METOPROLOL SUCCINATE 25 MG: 25 TABLET, EXTENDED RELEASE ORAL at 05:07

## 2018-11-28 RX ADMIN — COLCHICINE 0.6 MG: 0.6 TABLET, FILM COATED ORAL at 05:07

## 2018-11-28 RX ADMIN — SUCRALFATE 1 G: 1 TABLET ORAL at 12:39

## 2018-11-28 RX ADMIN — KETOROLAC TROMETHAMINE 30 MG: 30 INJECTION, SOLUTION INTRAMUSCULAR at 05:07

## 2018-11-28 RX ADMIN — DOFETILIDE 500 MCG: 0.5 CAPSULE ORAL at 17:43

## 2018-11-28 ASSESSMENT — ENCOUNTER SYMPTOMS
FEVER: 0
HEADACHES: 0
MUSCULOSKELETAL NEGATIVE: 1
ABDOMINAL PAIN: 0
DIZZINESS: 0
VOMITING: 0
PSYCHIATRIC NEGATIVE: 1
WHEEZING: 0
SORE THROAT: 0
PALPITATIONS: 0
COUGH: 0
FLANK PAIN: 0
SEIZURES: 0
CHILLS: 0
NAUSEA: 0
SHORTNESS OF BREATH: 0

## 2018-11-28 ASSESSMENT — PATIENT HEALTH QUESTIONNAIRE - PHQ9
SUM OF ALL RESPONSES TO PHQ9 QUESTIONS 1 AND 2: 0
2. FEELING DOWN, DEPRESSED, IRRITABLE, OR HOPELESS: NOT AT ALL
1. LITTLE INTEREST OR PLEASURE IN DOING THINGS: NOT AT ALL

## 2018-11-28 ASSESSMENT — PAIN SCALES - GENERAL
PAINLEVEL_OUTOF10: 0

## 2018-11-28 NOTE — PROGRESS NOTES
Bedside report with GIGI King, pt is alert and oriented on room air, home CPAP at bedside for overnight. Pt has no c/o pain, tolerating PO diet, call bell within reach, up independently. Will continue to monitor.

## 2018-11-28 NOTE — PROGRESS NOTES
Cardiology Follow Up Progress Note    Date of Service  11/28/2018    Attending Physician  Alexi Faith M.D.    Chief Complaint   AF    HPI  Juan Martel is a 58 y.o. male admitted 11/27/2018 with recurrent persistent symptomatic AF scheduled for OP atrial fib ablation.  Patient is a 57 yo M with history of PPM, AF s/p ablation of said AF almost 2 years ago with Ry. Subsequently weaned off OAC and his antiarrhythmic (dofetilide). Prior to ablation his AAD options including Tikosyn and Multaq were not effective he says. He noticed palpitations recently and on device check he has recently gone back into persistent AF.    Interim Events  Electrophysiologic Findings:    1. Sinus  694 ms, HV 42 ms. AVBCL = 280 ms. AERP 600/220 msec. AVNERP 600/320/220.     Impressions:    1. Persistent atrial fibrillation.    2. Successful RF ablation pulmonary vein isolation procedure.    3. Successful RF roof and floor line of LA  4. Additional RF ablation of anterior wall CFAEs     Recommendations:  1. Resume anticoagulation and restart Tikosyn.  2. Start colchicine 0.6 BID x 2 weeks, carafate x 2 week and daily PPI x 1 month.  3. Admit to monitored bedrest.    Review of Systems  Review of Systems   Constitutional: Negative for chills and fever.   HENT: Negative for congestion and sore throat.    Respiratory: Negative for cough, shortness of breath and wheezing.    Cardiovascular: Negative for chest pain, palpitations and leg swelling.   Gastrointestinal: Negative for abdominal pain, nausea and vomiting.   Genitourinary: Negative for dysuria, flank pain and frequency.   Musculoskeletal: Negative.    Skin: Negative.    Neurological: Negative for dizziness, seizures and headaches.   Psychiatric/Behavioral: Negative.        Vital signs in last 24 hours  Temp:  [36.2 °C (97.2 °F)-36.6 °C (97.9 °F)] 36.2 °C (97.2 °F)  Pulse:  [60-98] 60  Resp:  [12-20] 20  BP: (95-98)/(63-70) 98/68    Physical Exam  Physical Exam   Constitutional: He  is oriented to person, place, and time. He appears well-developed and well-nourished.   HENT:   Head: Normocephalic and atraumatic.   Eyes: Pupils are equal, round, and reactive to light.   Neck: Normal range of motion. Neck supple. No thyromegaly present.   Cardiovascular: Normal rate and regular rhythm.  Exam reveals no gallop and no friction rub.    No murmur heard.  Right arm arterial site and groin sites uncomplicated. Dressings in place CDI.   Pulmonary/Chest: Effort normal and breath sounds normal. No respiratory distress. He has no wheezes. He has no rales.   Abdominal: Soft. Bowel sounds are normal. He exhibits no distension. There is no tenderness. There is no guarding.   Musculoskeletal: Normal range of motion. He exhibits no edema.   Neurological: He is alert and oriented to person, place, and time.   Skin: Skin is warm and dry.   Psychiatric: He has a normal mood and affect.       Lab Review  Lab Results   Component Value Date/Time    WBC 9.1 11/26/2018 02:00 PM    RBC 5.23 11/26/2018 02:00 PM    HEMOGLOBIN 16.2 11/26/2018 02:00 PM    HEMATOCRIT 48.6 11/26/2018 02:00 PM    MCV 92.9 11/26/2018 02:00 PM    MCH 31.0 11/26/2018 02:00 PM    MCHC 33.3 (L) 11/26/2018 02:00 PM    MPV 11.3 11/26/2018 02:00 PM      Lab Results   Component Value Date/Time    SODIUM 139 11/28/2018 08:09 AM    POTASSIUM 4.7 11/28/2018 08:09 AM    CHLORIDE 107 11/28/2018 08:09 AM    CO2 26 11/28/2018 08:09 AM    GLUCOSE 118 (H) 11/28/2018 08:09 AM    BUN 22 11/28/2018 08:09 AM    CREATININE 1.18 11/28/2018 08:09 AM      Lab Results   Component Value Date/Time    ASTSGOT 18 11/27/2018 12:30 PM    ALTSGPT 16 11/27/2018 12:30 PM     Lab Results   Component Value Date/Time    CHOLSTRLTOT 125 04/13/2016 03:53 AM    LDL 76 04/13/2016 03:53 AM    HDL 20 (A) 04/13/2016 03:53 AM    TRIGLYCERIDE 144 04/13/2016 03:53 AM    TROPONINI <0.02 11/27/2017 03:36 PM             Cardiac Imaging and Procedures Review  EKG:  My personal interpretation of  the EKG dated 11/28/18 @ 6:52 AM:  ATRIAL-PACED COMPLEXES   FIRST DEGREE AV BLOCK   PROBABLE LEFT ATRIAL ABNORMALITY   LEFT AXIS DEVIATION   ABNORMAL T, CONSIDER ISCHEMIA, INFERIOR LEADS   Compared to ECG 11/27/2018 17:53:57   First degree AV block now present   Left-axis deviation now present   QTc 472ms not ventricularly paced.  Paced QTc 505ms.    Echocardiogram:    CONCLUSIONS  .Normal left ventricular systolic function.  Left ventricular ejection fraction is visually estimated to be 55%.  Normal diastolic function.  Mildly dilated right ventricle.  The right atrium is normal in size.  The left atrium is normal in size.  Structurally normal mitral valve without significant stenosis or   regurgitation.  Structurally normal aortic valve without significant stenosis or   regurgitation.  Estimated right ventricular systolic pressure  is 30 mmHg.  Compared to the prior echo of 4/14/ 2014, the EF is mildly improved    Assessment/Plan  1. Atrial fibrillation persistent  Underwent PVI 11/27/18 with Dr Faith.  2. Tikosyn load  QTc stable unpaced 472ms Paced 505ms.  Anticipate home Friday AM .  Rx to   Thank you for allowing me to participate in the care of this patient.      Please contact me with any questions.    CHIDI Grhaam.   Cardiologist, [facility]  [phone]

## 2018-11-28 NOTE — CARE PLAN
Problem: Infection  Goal: Will remain free from infection    Intervention: Assess signs and symptoms of infection  No s/s of infection

## 2018-11-28 NOTE — PROGRESS NOTES
Christopher groin dressings C,D & I. No bleeding, hematoma noted and areas soft. Right wrist CD&I.

## 2018-11-28 NOTE — DISCHARGE PLANNING
Anticipated Discharge Disposition: Home    Action: [facility] has [facility] stock and copay is $10.  Pt notified.     Barriers to Discharge: medical clearance    Plan: Home

## 2018-11-28 NOTE — PROGRESS NOTES
This RN spoke with pharmacist regarding QTc 505 from prior tikosyn administration, per pharmacist notes MD aware and said pt previously stable on this dose, OK to give AM dose and follow up with EKG per orders.

## 2018-11-28 NOTE — DIETARY
NUTRITION SERVICES: BMI - Pt with BMI >40 (= 40.7). Weight loss counseling not appropriate in acute care setting.     RECOMMEND - Referral to outpatient nutrition services for weight management after D/C.

## 2018-11-28 NOTE — CARE PLAN
Problem: Safety  Goal: Will remain free from injury    Intervention: Provide assistance with mobility  No assistance required when mobilizing

## 2018-11-29 LAB
ANION GAP SERPL CALC-SCNC: 5 MMOL/L (ref 0–11.9)
BUN SERPL-MCNC: 30 MG/DL (ref 8–22)
CALCIUM SERPL-MCNC: 8.3 MG/DL (ref 8.5–10.5)
CHLORIDE SERPL-SCNC: 105 MMOL/L (ref 96–112)
CO2 SERPL-SCNC: 26 MMOL/L (ref 20–33)
CREAT SERPL-MCNC: 1.24 MG/DL (ref 0.5–1.4)
EKG IMPRESSION: NORMAL
EKG IMPRESSION: NORMAL
GLUCOSE SERPL-MCNC: 96 MG/DL (ref 65–99)
MAGNESIUM SERPL-MCNC: 2.4 MG/DL (ref 1.5–2.5)
POTASSIUM SERPL-SCNC: 4.4 MMOL/L (ref 3.6–5.5)
SODIUM SERPL-SCNC: 136 MMOL/L (ref 135–145)

## 2018-11-29 PROCEDURE — 700102 HCHG RX REV CODE 250 W/ 637 OVERRIDE(OP): Performed by: NURSE PRACTITIONER

## 2018-11-29 PROCEDURE — 700102 HCHG RX REV CODE 250 W/ 637 OVERRIDE(OP): Performed by: INTERNAL MEDICINE

## 2018-11-29 PROCEDURE — A9270 NON-COVERED ITEM OR SERVICE: HCPCS | Performed by: NURSE PRACTITIONER

## 2018-11-29 PROCEDURE — 93005 ELECTROCARDIOGRAM TRACING: CPT | Performed by: INTERNAL MEDICINE

## 2018-11-29 PROCEDURE — 83735 ASSAY OF MAGNESIUM: CPT

## 2018-11-29 PROCEDURE — 80048 BASIC METABOLIC PNL TOTAL CA: CPT

## 2018-11-29 PROCEDURE — 93010 ELECTROCARDIOGRAM REPORT: CPT | Mod: 77 | Performed by: INTERNAL MEDICINE

## 2018-11-29 PROCEDURE — 93010 ELECTROCARDIOGRAM REPORT: CPT | Performed by: INTERNAL MEDICINE

## 2018-11-29 PROCEDURE — 700111 HCHG RX REV CODE 636 W/ 250 OVERRIDE (IP): Performed by: INTERNAL MEDICINE

## 2018-11-29 PROCEDURE — 770020 HCHG ROOM/CARE - TELE (206)

## 2018-11-29 PROCEDURE — 36415 COLL VENOUS BLD VENIPUNCTURE: CPT

## 2018-11-29 PROCEDURE — A9270 NON-COVERED ITEM OR SERVICE: HCPCS | Performed by: INTERNAL MEDICINE

## 2018-11-29 PROCEDURE — 99231 SBSQ HOSP IP/OBS SF/LOW 25: CPT | Performed by: NURSE PRACTITIONER

## 2018-11-29 RX ADMIN — DOFETILIDE 500 MCG: 0.5 CAPSULE ORAL at 05:50

## 2018-11-29 RX ADMIN — DOFETILIDE 500 MCG: 0.5 CAPSULE ORAL at 17:36

## 2018-11-29 RX ADMIN — METOPROLOL SUCCINATE 25 MG: 25 TABLET, EXTENDED RELEASE ORAL at 17:36

## 2018-11-29 RX ADMIN — ALLOPURINOL 300 MG: 300 TABLET ORAL at 05:50

## 2018-11-29 RX ADMIN — SUCRALFATE 1 G: 1 TABLET ORAL at 17:36

## 2018-11-29 RX ADMIN — SUCRALFATE 1 G: 1 TABLET ORAL at 05:50

## 2018-11-29 RX ADMIN — KETOROLAC TROMETHAMINE 30 MG: 30 INJECTION, SOLUTION INTRAMUSCULAR at 12:16

## 2018-11-29 RX ADMIN — SUCRALFATE 1 G: 1 TABLET ORAL at 21:14

## 2018-11-29 RX ADMIN — OMEPRAZOLE 20 MG: 20 CAPSULE, DELAYED RELEASE ORAL at 05:50

## 2018-11-29 RX ADMIN — RIVAROXABAN 20 MG: 20 TABLET, FILM COATED ORAL at 17:36

## 2018-11-29 RX ADMIN — KETOROLAC TROMETHAMINE 30 MG: 30 INJECTION, SOLUTION INTRAMUSCULAR at 23:26

## 2018-11-29 RX ADMIN — KETOROLAC TROMETHAMINE 30 MG: 30 INJECTION, SOLUTION INTRAMUSCULAR at 17:36

## 2018-11-29 RX ADMIN — KETOROLAC TROMETHAMINE 30 MG: 30 INJECTION, SOLUTION INTRAMUSCULAR at 00:26

## 2018-11-29 RX ADMIN — COLCHICINE 0.6 MG: 0.6 TABLET, FILM COATED ORAL at 17:36

## 2018-11-29 RX ADMIN — SUCRALFATE 1 G: 1 TABLET ORAL at 12:16

## 2018-11-29 RX ADMIN — METOPROLOL SUCCINATE 25 MG: 25 TABLET, EXTENDED RELEASE ORAL at 09:25

## 2018-11-29 RX ADMIN — KETOROLAC TROMETHAMINE 30 MG: 30 INJECTION, SOLUTION INTRAMUSCULAR at 05:50

## 2018-11-29 RX ADMIN — COLCHICINE 0.6 MG: 0.6 TABLET, FILM COATED ORAL at 05:51

## 2018-11-29 ASSESSMENT — ENCOUNTER SYMPTOMS
COUGH: 0
PSYCHIATRIC NEGATIVE: 1
SORE THROAT: 0
VOMITING: 0
SHORTNESS OF BREATH: 0
ABDOMINAL PAIN: 0
FEVER: 0
HEADACHES: 0
NAUSEA: 0
WHEEZING: 0
FLANK PAIN: 0
MUSCULOSKELETAL NEGATIVE: 1
CHILLS: 0
PALPITATIONS: 0
DIZZINESS: 0
SEIZURES: 0

## 2018-11-29 ASSESSMENT — PAIN SCALES - GENERAL
PAINLEVEL_OUTOF10: 0

## 2018-11-29 NOTE — PROGRESS NOTES
Beside report received. Pt a/o eating dinner at edge of bed. Tele box on. Bed locked, low position. Call light within reach. White board updated.

## 2018-11-29 NOTE — CARE PLAN
Problem: Communication  Goal: The ability to communicate needs accurately and effectively will improve  Outcome: PROGRESSING AS EXPECTED      Problem: Safety  Goal: Will remain free from injury  Outcome: PROGRESSING AS EXPECTED      Problem: Bowel/Gastric:  Goal: Normal bowel function is maintained or improved  Outcome: PROGRESSING AS EXPECTED      Problem: Knowledge Deficit  Goal: Knowledge of disease process/condition, treatment plan, diagnostic tests, and medications will improve  Outcome: PROGRESSING AS EXPECTED

## 2018-11-29 NOTE — PROGRESS NOTES
Cardiology Follow Up Progress Note    Date of Service  11/29/2018    Attending Physician  Alexi Faith M.D.    Chief Complaint   AF    HPI  Juan Martel is a 58 y.o. male admitted 11/27/2018 with recurrent persistent symptomatic AF scheduled for OP atrial fib ablation.  Patient is a 57 yo M with history of PPM, AF s/p ablation of said AF almost 2 years ago with Ry. Subsequently weaned off OAC and his antiarrhythmic (dofetilide). Prior to ablation his AAD options including Tikosyn and Multaq were not effective he says. He noticed palpitations recently and on device check he has recently gone back into persistent AF.     Interim Events  Electrophysiologic Findings:    1. Sinus  694 ms, HV 42 ms. AVBCL = 280 ms. AERP 600/220 msec. AVNERP 600/320/220.     Impressions:    1. Persistent atrial fibrillation.    2. Successful RF ablation pulmonary vein isolation procedure.    3. Successful RF roof and floor line of LA  4. Additional RF ablation of anterior wall CFAEs     Recommendations:  1. Resume anticoagulation and restart Tikosyn.  2. Start colchicine 0.6 BID x 2 weeks, carafate x 2 week and daily PPI x 1 month.  3. Admit to monitored bedrest.     Interim Events  Maintaining sinus rhythm  Appropriate pacing.    Review of Systems  Review of Systems   Constitutional: Negative for chills and fever.   HENT: Negative for congestion and sore throat.    Respiratory: Negative for cough, shortness of breath and wheezing.    Cardiovascular: Negative for chest pain, palpitations and leg swelling.   Gastrointestinal: Negative for abdominal pain, nausea and vomiting.   Genitourinary: Negative for dysuria, flank pain and frequency.   Musculoskeletal: Negative.    Skin: Negative.    Neurological: Negative for dizziness, seizures and headaches.   Psychiatric/Behavioral: Negative.        Vital signs in last 24 hours  Temp:  [36.1 °C (97 °F)-36.6 °C (97.8 °F)] 36.1 °C (97 °F)  Pulse:  [58-65] 64  Resp:  [16-18] 16  BP:  ()/(56-72) 107/71    Physical Exam  Physical Exam   Constitutional: He is oriented to person, place, and time. He appears well-developed and well-nourished.   HENT:   Head: Normocephalic and atraumatic.   Eyes: Pupils are equal, round, and reactive to light.   Neck: Normal range of motion. Neck supple. No thyromegaly present.   Cardiovascular: Normal rate and regular rhythm.  Exam reveals no gallop and no friction rub.    No murmur heard.  Pulmonary/Chest: Effort normal and breath sounds normal. No respiratory distress. He has no wheezes. He has no rales.   Pacemaker site uncomplicated.   Abdominal: Soft. Bowel sounds are normal. He exhibits no distension. There is no tenderness. There is no guarding.   Musculoskeletal: Normal range of motion. He exhibits no edema.   Neurological: He is alert and oriented to person, place, and time.   Skin: Skin is warm and dry.   Psychiatric: He has a normal mood and affect.       Lab Review  Lab Results   Component Value Date/Time    WBC 9.1 11/26/2018 02:00 PM    RBC 5.23 11/26/2018 02:00 PM    HEMOGLOBIN 16.2 11/26/2018 02:00 PM    HEMATOCRIT 48.6 11/26/2018 02:00 PM    MCV 92.9 11/26/2018 02:00 PM    MCH 31.0 11/26/2018 02:00 PM    MCHC 33.3 (L) 11/26/2018 02:00 PM    MPV 11.3 11/26/2018 02:00 PM      Lab Results   Component Value Date/Time    SODIUM 136 11/29/2018 02:31 AM    POTASSIUM 4.4 11/29/2018 02:31 AM    CHLORIDE 105 11/29/2018 02:31 AM    CO2 26 11/29/2018 02:31 AM    GLUCOSE 96 11/29/2018 02:31 AM    BUN 30 (H) 11/29/2018 02:31 AM    CREATININE 1.24 11/29/2018 02:31 AM      Lab Results   Component Value Date/Time    ASTSGOT 18 11/27/2018 12:30 PM    ALTSGPT 16 11/27/2018 12:30 PM     Lab Results   Component Value Date/Time    CHOLSTRLTOT 125 04/13/2016 03:53 AM    LDL 76 04/13/2016 03:53 AM    HDL 20 (A) 04/13/2016 03:53 AM    TRIGLYCERIDE 144 04/13/2016 03:53 AM    TROPONINI <0.02 11/27/2017 03:36 PM             Cardiac Imaging and Procedures Review  EKG:  My  personal interpretation of the EKG dated 11/28/18 @ 6:52 AM:  ATRIAL-PACED COMPLEXES   FIRST DEGREE AV BLOCK   PROBABLE LEFT ATRIAL ABNORMALITY   LEFT AXIS DEVIATION   ABNORMAL T, CONSIDER ISCHEMIA, INFERIOR LEADS   Compared to ECG 11/27/2018 17:53:57   First degree AV block now present   Left-axis deviation now present   QTc 449ms not ventricularly paced.  Paced QTc 505ms.     Echocardiogram:    CONCLUSIONS  .Normal left ventricular systolic function.  Left ventricular ejection fraction is visually estimated to be 55%.  Normal diastolic function.  Mildly dilated right ventricle.  The right atrium is normal in size.  The left atrium is normal in size.  Structurally normal mitral valve without significant stenosis or   regurgitation.  Structurally normal aortic valve without significant stenosis or   regurgitation.  Estimated right ventricular systolic pressure  is 30 mmHg.  Compared to the prior echo of 4/14/ 2014, the EF is mildly improved     Assessment/Plan  1. Atrial fibrillation persistent  Underwent PVI 11/27/18 with Dr Faith.  2. Tikosyn load  QTc stable unpaced 449ms Paced 505ms.  Anticipate home Friday AM after 5th dose and subsequent EKG.  Weight up today does not appear fluid long.  Continue to monitor.  [facility].  Thank you for allowing me to participate in the care of this patient.      Please contact me with any questions.    CHIDI Graham.   Cardiologist, [facility]  [phone]

## 2018-11-30 VITALS
HEART RATE: 62 BPM | RESPIRATION RATE: 18 BRPM | DIASTOLIC BLOOD PRESSURE: 85 MMHG | WEIGHT: 285.5 LBS | BODY MASS INDEX: 42.29 KG/M2 | HEIGHT: 69 IN | SYSTOLIC BLOOD PRESSURE: 131 MMHG | TEMPERATURE: 97.3 F | OXYGEN SATURATION: 99 %

## 2018-11-30 DIAGNOSIS — I48.19 PERSISTENT ATRIAL FIBRILLATION (HCC): ICD-10-CM

## 2018-11-30 LAB
EKG IMPRESSION: NORMAL
EKG IMPRESSION: NORMAL

## 2018-11-30 PROCEDURE — 93010 ELECTROCARDIOGRAM REPORT: CPT | Performed by: INTERNAL MEDICINE

## 2018-11-30 PROCEDURE — 700102 HCHG RX REV CODE 250 W/ 637 OVERRIDE(OP): Performed by: INTERNAL MEDICINE

## 2018-11-30 PROCEDURE — A9270 NON-COVERED ITEM OR SERVICE: HCPCS | Performed by: INTERNAL MEDICINE

## 2018-11-30 PROCEDURE — 99238 HOSP IP/OBS DSCHRG MGMT 30/<: CPT | Performed by: NURSE PRACTITIONER

## 2018-11-30 PROCEDURE — 700111 HCHG RX REV CODE 636 W/ 250 OVERRIDE (IP): Performed by: INTERNAL MEDICINE

## 2018-11-30 PROCEDURE — A9270 NON-COVERED ITEM OR SERVICE: HCPCS | Performed by: NURSE PRACTITIONER

## 2018-11-30 PROCEDURE — 93005 ELECTROCARDIOGRAM TRACING: CPT | Performed by: INTERNAL MEDICINE

## 2018-11-30 PROCEDURE — 700102 HCHG RX REV CODE 250 W/ 637 OVERRIDE(OP): Performed by: NURSE PRACTITIONER

## 2018-11-30 RX ORDER — DOFETILIDE 0.5 MG/1
500 CAPSULE ORAL EVERY 12 HOURS
Qty: 60 CAP | Refills: 3 | Status: SHIPPED | OUTPATIENT
Start: 2018-11-30 | End: 2019-04-24 | Stop reason: SDUPTHER

## 2018-11-30 RX ORDER — COLCHICINE 0.6 MG/1
0.6 TABLET ORAL 2 TIMES DAILY
Qty: 10 TAB | Refills: 0 | Status: SHIPPED | OUTPATIENT
Start: 2018-11-30 | End: 2019-01-09

## 2018-11-30 RX ORDER — OMEPRAZOLE 20 MG/1
20 CAPSULE, DELAYED RELEASE ORAL
Qty: 30 CAP | Refills: 0 | Status: SHIPPED | OUTPATIENT
Start: 2018-12-01 | End: 2019-03-13

## 2018-11-30 RX ORDER — SUCRALFATE 1 G/1
1 TABLET ORAL
Qty: 60 TAB | Refills: 0 | Status: SHIPPED | OUTPATIENT
Start: 2018-11-30 | End: 2019-01-09

## 2018-11-30 RX ADMIN — ALLOPURINOL 300 MG: 300 TABLET ORAL at 05:59

## 2018-11-30 RX ADMIN — METOPROLOL SUCCINATE 25 MG: 25 TABLET, EXTENDED RELEASE ORAL at 06:01

## 2018-11-30 RX ADMIN — SUCRALFATE 1 G: 1 TABLET ORAL at 05:59

## 2018-11-30 RX ADMIN — COLCHICINE 0.6 MG: 0.6 TABLET, FILM COATED ORAL at 05:59

## 2018-11-30 RX ADMIN — KETOROLAC TROMETHAMINE 30 MG: 30 INJECTION, SOLUTION INTRAMUSCULAR at 05:59

## 2018-11-30 RX ADMIN — OMEPRAZOLE 20 MG: 20 CAPSULE, DELAYED RELEASE ORAL at 05:59

## 2018-11-30 RX ADMIN — DOFETILIDE 500 MCG: 0.5 CAPSULE ORAL at 05:59

## 2018-11-30 ASSESSMENT — PAIN SCALES - GENERAL
PAINLEVEL_OUTOF10: 0
PAINLEVEL_OUTOF10: 0

## 2018-11-30 NOTE — DISCHARGE SUMMARY
DISCHARGE DIAGNOSES:  1.  Persistent atrial fibrillation.  2.  Chronic anticoagulation.  3.  Obstructive sleep apnea.  4.  High risk medication use.  5.  Cardiac pacemaker in situ.  6.  History of prior atrial fibrillation ablation.    HISTORY OF PRESENT HOSPITALIZATION:  The patient is a 58-year-old male with a   history of pacemaker, prior atrial fib ablation approximately 2 years ago with   recurrence of persistent atrial fibrillation.  Patient has previously been on   Tikosyn and Multaq, which were ineffective.  He was admitted electively by   Dr. Faith on 11/27 for repeat atrial fib ablation.  Please see procedure report.    Complications of the procedure were none.    RESULTS OF THE PROCEDURE WERE AS FOLLOWS:  1.  Persistent atrial fibrillation.  2.  Successful RF ablation, pulmonary vein isolation procedure.  3.  Successful RF roof and floor line of the left atrium.  4.  Additional RF ablation of anterior wall with CFAE.    The patient was restarted on Tikosyn.  He was kept for the 5 doses.  His QTCs   have remained very stable.  He is continued in sinus rhythm.  He has a paced   rhythm.  His QTCs have been at 468 msec.  The patient has felt well throughout   the hospital stay.  He has been afebrile at 36.3, blood pressure 127/83,   weight standing were of late 126.6.    INITIAL LABORATORY DATA:  H and H 16.2 and 48.6.  Other labs, sodium 136,   potassium 4.4, chloride 105, CO2 26, BUN 30, and creatinine 1.24 with EGFR 59.    Magnesium at 2.4.    IMPRESSION:  1.  History of recurrent atrial fibrillation.  2.  Status post pulmonary vein isolation by Dr. Faith on 11/27/2018.  3.  High risk medication reinstitution of Tikosyn, which was tolerated well   throughout the hospital stay.  4.  History of obstructive sleep apnea.  5.  History of chronic anticoagulation.  6.  History of cardiac pacemaker in situ.    DISCHARGE MEDICATIONS:  Will include allopurinol 300 mg daily, colchicine 0.6   twice daily for another 10  days, Tikosyn 500 mcg twice daily, metoprolol XL 25   mg twice daily, omeprazole 20 mg daily, Carafate 1 g a.c. and at bedtime for   another 2 weeks, and Xarelto 20 mg daily.    PLAN:  The patient will be seen in followup in our office for review   post-atrial fibrillation ablation and resumption of Tikosyn therapy.  The   patient will report to the emergency room for increasing chest pain, shortness   of breath, focal neurological changes, problems with the catheter access   sites with drainage or redness, or fever of 101 or greater.  Patient will   notify our office for recurrence of atrial fibrillation of greater than 2   hours.  He will call for other questions as well.       ____________________________________     MADINA Olivas / JANNA    DD:  11/30/2018 08:37:42  DT:  11/30/2018 11:13:50    D#:  [id number]  Job#:  [id number]

## 2018-11-30 NOTE — PROGRESS NOTES
Pt discharged from hospital today. His home pharmacy did not have Tikosyn available for him to  today. New rx sent to [facility] at request of JAMIL. Verified pharmacy does have it in stock. Called pt; apologized and requested he  at [facility] TODAY as he cannot miss a dose.

## 2018-11-30 NOTE — CARE PLAN
Problem: Safety  Goal: Will remain free from injury  Outcome: PROGRESSING AS EXPECTED      Problem: Bowel/Gastric:  Goal: Normal bowel function is maintained or improved  Outcome: PROGRESSING AS EXPECTED      Problem: Knowledge Deficit  Goal: Knowledge of disease process/condition, treatment plan, diagnostic tests, and medications will improve  Outcome: PROGRESSING AS EXPECTED

## 2018-11-30 NOTE — CARE PLAN
Problem: Safety  Goal: Will remain free from injury  Outcome: PROGRESSING AS EXPECTED  Pt educated to utilize call light when needing assistance to decrease chances of falling. Pt has not fallen this shift. Will continue to monitor.    Problem: Knowledge Deficit  Goal: Knowledge of disease process/condition, treatment plan, diagnostic tests, and medications will improve  Outcome: PROGRESSING AS EXPECTED  Pt educated on care plan, medications, treatment plan, diagnostic tests, and medications prescribed for the day. Pt verbalizes teaching and understanding of education. Will continue to monitor throughout the shift.

## 2018-11-30 NOTE — PROGRESS NOTES
Discharge orders received. All lines and monitors discontinued. Reviewed discharge paperwork with Allen. Discussed diet, activity, medications, follow up care and worsening symptoms. No questions at this time. Pt to be discharged to home via car/relative.

## 2018-11-30 NOTE — PROGRESS NOTES
Report received, assumed pt care. Pt sitting at edge of bed visiting with family. Denies pain/sob. Call light within reach, bed locked, low position, white board updated.

## 2018-11-30 NOTE — DISCHARGE INSTRUCTIONS
No lifting > 10 lbs x 1 week.  No baths or hot tubs x 1 week.  May shower today after going home.  Continue to monitor sites daily for warmth, redness, discolored drainage     Please take the esophageal protection medication that is prescribed to you for one month post procedure without missed doses.  Please do not miss any doses of your blood thinner.       Please walk and take deep breaths after discharge.  After discharge, if  experiences chest pain, shortness of breath, hemoptysis, neurological changes, high fever 101, pre syncope/syncope, trouble with catheter sites needs to be seen in the emergency dept.   Recurrence of AF please call our office.    Discharge Instructions    Discharged to home by car with relative. Discharged via walking, hospital escort: Refused.  Special equipment needed: Not Applicable    Be sure to schedule a follow-up appointment with your primary care doctor or any specialists as instructed.     Discharge Plan:   Influenza Vaccine Indication: Patient Refuses    I understand that a diet low in cholesterol, fat, and sodium is recommended for good health. Unless I have been given specific instructions below for another diet, I accept this instruction as my diet prescription.   Other diet: N/A    Special Instructions:   No lifting > 10 lbs x 1 week.  No baths or hot tubs x 1 week.  May shower today after going home.  Continue to monitor sites daily for warmth, redness, discolored drainage     Please take the esophageal protection medication that is prescribed to you for one month post procedure without missed doses.  Please do not miss any doses of your blood thinner.       Please walk and take deep breaths after discharge.  After discharge, if  experiences chest pain, shortness of breath, hemoptysis, neurological changes, high fever 101, pre syncope/syncope, trouble with catheter sites needs to be seen in the emergency dept.   Recurrence of AF please call our office.    · Is patient  discharged on Warfarin / Coumadin?   No       Atrial Fibrillation  Introduction  Atrial fibrillation is a type of heartbeat that is irregular or fast (rapid). If you have this condition, your heart keeps quivering in a weird (chaotic) way. This condition can make it so your heart cannot pump blood normally. Having this condition gives a person more risk for stroke, heart failure, and other heart problems. There are different types of atrial fibrillation. Talk with your doctor to learn about the type that you have.  Follow these instructions at home:  · Take over-the-counter and prescription medicines only as told by your doctor.  · If your doctor prescribed a blood-thinning medicine, take it exactly as told. Taking too much of it can cause bleeding. If you do not take enough of it, you will not have the protection that you need against stroke and other problems.  · Do not use any tobacco products. These include cigarettes, chewing tobacco, and e-cigarettes. If you need help quitting, ask your doctor.  · If you have apnea (obstructive sleep apnea), manage it as told by your doctor.  · Do not drink alcohol.  · Do not drink beverages that have caffeine. These include coffee, soda, and tea.  · Maintain a healthy weight. Do not use diet pills unless your doctor says they are safe for you. Diet pills may make heart problems worse.  · Follow diet instructions as told by your doctor.  · Exercise regularly as told by your doctor.  · Keep all follow-up visits as told by your doctor. This is important.  Contact a doctor if:  · You notice a change in the speed, rhythm, or strength of your heartbeat.  · You are taking a blood-thinning medicine and you notice more bruising.  · You get tired more easily when you move or exercise.  Get help right away if:  · You have pain in your chest or your belly (abdomen).  · You have sweating or weakness.  · You feel sick to your stomach (nauseous).  · You notice blood in your throw up  (vomit), poop (stool), or pee (urine).  · You are short of breath.  · You suddenly have swollen feet and ankles.  · You feel dizzy.  · Your suddenly get weak or numb in your face, arms, or legs, especially if it happens on one side of your body.  · You have trouble talking, trouble understanding, or both.  · Your face or your eyelid droops on one side.  These symptoms may be an emergency. Do not wait to see if the symptoms will go away. Get medical help right away. Call your local emergency services (911 in the [place] Do not drive yourself to the hospital.   This information is not intended to replace advice given to you by your health care provider. Make sure you discuss any questions you have with your health care provider.  Document Released: 09/26/2009 Document Revised: 05/25/2017 Document Reviewed: 04/13/2016  © 2017 Elsevier    Cardiac Ablation  Cardiac ablation is a procedure to stop some heart tissue from causing problems. The heart has many electrical connections. Sometimes these connections cause the heart to beat very fast or irregularly. Removing some of the problem areas can improve heart rhythm or make it normal. Ablation is done for people who:  · Have Corinne-Parkinson-White syndrome.  · Have other fast heart rhythms (tachycardia).  · Have taken medicines for an abnormal heart rhythm (arrhythmia) and the medicines had:  ¨ No success.  ¨ Side effects.  · May have a type of heartbeat that could cause death.  What happens before the procedure?  · Follow instructions from your doctor about eating and drinking before the procedure.  · Take your medicines as told by your doctor. Take them at regular times with water unless told differently by your doctor.  · If you are taking diabetes medicine, ask your doctor how to take it. Ask if there are any special instructions you should follow. Your doctor may change how much insulin you take the day of the procedure.  What happens during the procedure?  · A special  type of X-ray will be used. The X-ray helps your doctor see images of your heart during the procedure.  · A small cut (incision) will be made in your neck or groin.  · An IV tube will be started before the procedure begins.  · You will be given a numbing medicine (anesthetic) or a medicine to help you relax (sedative).  · The skin on your neck or groin will be numbed.  · A needle will be put into a large vein in your neck or groin.  · A thin, flexible tube (catheter) will be put in to reach your heart.  · A dye will be put in the tube. The dye will show up on X-rays. It will help your doctor see the area of the heart that needs treatment.  · When the heart tissue that is causing problems is found, the tip of the tube will send an electrical current to it. This will stop it from causing problems.  · The tube will be taken out.  · Pressure will be put on the area where the tube was. This will keep it from bleeding. A bandage will be placed over the area.  What happens after the procedure?  · You will be taken to a recovery area. Your blood pressure, heart rate, and breathing will be watched. The area where the tube was will also be watched for bleeding.  · You will need to lie still for 4-6 hours. This keeps the area where the tube was from bleeding.  This information is not intended to replace advice given to you by your health care provider. Make sure you discuss any questions you have with your health care provider.  Document Released: 08/20/2014 Document Revised: 05/25/2017 Document Reviewed: 05/15/2014  Elsevier Interactive Patient Education © 2017 Elsevier Inc.    Depression / Suicide Risk    As you are discharged from this [facility] facility, it is important to learn how to keep safe from harming yourself.    Recognize the warning signs:  · Abrupt changes in personality, positive or negative- including increase in energy   · Giving away possessions  · Change in eating patterns- significant weight changes-   positive or negative  · Change in sleeping patterns- unable to sleep or sleeping all the time   · Unwillingness or inability to communicate  · Depression  · Unusual sadness, discouragement and loneliness  · Talk of wanting to die  · Neglect of personal appearance   · Rebelliousness- reckless behavior  · Withdrawal from people/activities they love  · Confusion- inability to concentrate     If you or a loved one observes any of these behaviors or has concerns about self-harm, here's what you can do:  · Talk about it- your feelings and reasons for harming yourself  · Remove any means that you might use to hurt yourself (examples: pills, rope, extension cords, firearm)  · Get professional help from the community (Mental Health, Substance Abuse, psychological counseling)  · Do not be alone:Call your Safe Contact- someone whom you trust who will be there for you.  · Call your local CRISIS HOTLINE 226-7354 or 990-896-5876  · Call your local Children's Mobile Crisis Response Team [place] (587) 609-1100 or www.[employer]  · Call the toll free National Suicide Prevention Hotlines   · National Suicide Prevention Lifeline 360-600-FRKS (3197)  · National Hope Line Network 800-SUICIDE (441-7822)

## 2018-12-11 ENCOUNTER — NON-PROVIDER VISIT (OUTPATIENT)
Dept: CARDIOLOGY | Facility: MEDICAL CENTER | Age: 58
End: 2018-12-11
Payer: MEDICARE

## 2018-12-11 DIAGNOSIS — Z95.0 CARDIAC PACEMAKER IN SITU: ICD-10-CM

## 2018-12-11 DIAGNOSIS — I49.5 SICK SINUS SYNDROME (HCC): ICD-10-CM

## 2018-12-11 PROCEDURE — 93280 PM DEVICE PROGR EVAL DUAL: CPT | Performed by: INTERNAL MEDICINE

## 2019-01-09 ENCOUNTER — OFFICE VISIT (OUTPATIENT)
Dept: CARDIOLOGY | Facility: MEDICAL CENTER | Age: 59
End: 2019-01-09
Payer: MEDICARE

## 2019-01-09 VITALS
HEART RATE: 61 BPM | WEIGHT: 276 LBS | SYSTOLIC BLOOD PRESSURE: 122 MMHG | BODY MASS INDEX: 40.88 KG/M2 | DIASTOLIC BLOOD PRESSURE: 74 MMHG | HEIGHT: 69 IN | OXYGEN SATURATION: 94 %

## 2019-01-09 DIAGNOSIS — Z95.0 PACEMAKER: ICD-10-CM

## 2019-01-09 DIAGNOSIS — Z79.899 HIGH RISK MEDICATION USE: ICD-10-CM

## 2019-01-09 DIAGNOSIS — I48.0 PAF (PAROXYSMAL ATRIAL FIBRILLATION) (HCC): ICD-10-CM

## 2019-01-09 DIAGNOSIS — Z98.890 S/P ABLATION OF ATRIAL FIBRILLATION: ICD-10-CM

## 2019-01-09 DIAGNOSIS — Z86.79 S/P ABLATION OF ATRIAL FIBRILLATION: ICD-10-CM

## 2019-01-09 DIAGNOSIS — I49.5 SSS (SICK SINUS SYNDROME) (HCC): ICD-10-CM

## 2019-01-09 DIAGNOSIS — Z79.01 CHRONIC ANTICOAGULATION: ICD-10-CM

## 2019-01-09 PROCEDURE — 99214 OFFICE O/P EST MOD 30 MIN: CPT | Mod: 25 | Performed by: NURSE PRACTITIONER

## 2019-01-09 PROCEDURE — 93288 INTERROG EVL PM/LDLS PM IP: CPT | Performed by: NURSE PRACTITIONER

## 2019-01-09 PROCEDURE — 93000 ELECTROCARDIOGRAM COMPLETE: CPT | Mod: 59 | Performed by: NURSE PRACTITIONER

## 2019-01-09 ASSESSMENT — ENCOUNTER SYMPTOMS
PALPITATIONS: 0
DIZZINESS: 0
FEVER: 0
WEAKNESS: 0
HEMOPTYSIS: 0
SPUTUM PRODUCTION: 0
SHORTNESS OF BREATH: 0
BLURRED VISION: 0
VOMITING: 0
PND: 0
ORTHOPNEA: 0
CHILLS: 0
BLOOD IN STOOL: 0
SORE THROAT: 0
NAUSEA: 0
LOSS OF CONSCIOUSNESS: 0
HEARTBURN: 0
TINGLING: 0
WEIGHT LOSS: 0
SPEECH CHANGE: 0
DIARRHEA: 0
DOUBLE VISION: 0
STRIDOR: 0
COUGH: 0
TREMORS: 0
WHEEZING: 0
ABDOMINAL PAIN: 0
FOCAL WEAKNESS: 0
HEADACHES: 0
SENSORY CHANGE: 0

## 2019-01-10 NOTE — PROGRESS NOTES
Cardiology/Electrophysiology Follow-up Note      Subjective:   Chief Complaint:   Chief Complaint   Patient presents with   • Atrial Fibrillation     HFU DX:PAF       Juan Martel is a 58 y.o. male who presents today for follow up atrial fibrillation post repeat Afib ablation by Dr. Faith (11/27/18) and reloading on Tikosyn for AAD.     He is a previous patient of Dr. Raza, now followed by Dr. Faith.  Past medical history also significant for SSS S/P [place] dual chamber PPM, Atrial fibrillation S/ P ablation, ARCHANA.    Procedural conclusions per Dr. Faith's op note:  Procedure(s) Performed:   1) Atrial fibrillation ablation  2) Additional atrial fibrillation ablation (CFAEs along anterior wall)  3) Ablation of secondary mechanism of tachycardia (LA roof line and floor line)  4) 3D mapping  5) ICE during diagnostics and intervention  6) Arrhythmia induction with IV drug infusion  7) Periprocedural device programing  Indication(s):  Persistent AF  Complications:  None  Total ablation time: 2545 seconds  Electrophysiologic Findings:    1. Sinus  694 ms, HV 42 ms. AVBCL = 280 ms. AERP 600/220 msec. AVNERP 600/320/220.  Impressions:    1. Persistent atrial fibrillation.    2. Successful RF ablation pulmonary vein isolation procedure.    3. Successful RF roof and floor line of LA  4. Additional RF ablation of anterior wall CFAEs        Today in follow up he tells me that he has been doing well since his ablation.  He has been feeling well and is not aware of any significant return of afib.  He has been taking it easy in terms of activity but has started to walk his dog again and getting about 400 steps a day in.  He denies any side effects of Tikosyn but states that it is about $140 a month copay with his new insurance.     He denies chest pain, dizziness, palpitations, pre syncope or syncope, dyspnea, PND, orthopnea, or lower extremity edema.      Patient endorses medication compliance and compliance with his CPAP.   He has  yet to establish with a new PCP.         Past Medical History:   Diagnosis Date   • A-fib (HCC)    • At risk for sleep apnea    • Cardiac asystole (HCC) 12/20/16    possible vaso-vagal   • Cardiac pacemaker in situ 12/20/2016   • Gout    • Pneumonia 2000   • Sleep apnea     CPAP     Past Surgical History:   Procedure Laterality Date   • OTHER CARDIAC SURGERY  12/20/16    pacemaker   • OTHER ORTHOPEDIC SURGERY  6/1/1990    achilles tendon repair left foot     Family History   Problem Relation Age of Onset   • Other Mother    • Heart Disease Mother         atrial fib   • Stroke Father 65        CVa   • Diabetes Father    • Cancer Sister    • Stroke Paternal Grandmother      Social History     Social History   • Marital status:      Spouse name: N/A   • Number of children: N/A   • Years of education: N/A     Occupational History   • Not on file.     Social History Main Topics   • Smoking status: Never Smoker   • Smokeless tobacco: Never Used   • Alcohol use No      Comment: occasional   • Drug use: No   • Sexual activity: Not on file     Other Topics Concern   • Not on file     Social History Narrative   • No narrative on file     Allergies   Allergen Reactions   • Bloodless    • Codeine Vomiting   • Tramadol Vomiting and Nausea       Current Outpatient Prescriptions   Medication Sig Dispense Refill   • dofetilide (TIKOSYN) 500 MCG Cap Take 1 Cap by mouth every 12 hours. 60 Cap 3   • metoprolol SR (TOPROL XL) 25 MG TABLET SR 24 HR Take 1 Tab by mouth 2 Times a Day. 60 Tab 6   • rivaroxaban (XARELTO) 20 MG Tab tablet Take 1 Tab by mouth with dinner. 30 Tab 6   • allopurinol (ZYLOPRIM) 300 MG Tab Take 1 Tab by mouth every day. 30 Tab 3   • Magnesium 500 MG Tab Take 1 Cap by mouth every day.     • omeprazole (PRILOSEC) 20 MG delayed-release capsule Take 1 Cap by mouth every morning before breakfast. (Patient not taking: Reported on 1/9/2019) 30 Cap 0     No current facility-administered medications for this visit.   "      Review of Systems   Constitutional: Negative for chills, fever, malaise/fatigue and weight loss.   HENT: Negative for congestion, sore throat and tinnitus.    Eyes: Negative for blurred vision and double vision.   Respiratory: Negative for cough, hemoptysis, sputum production, shortness of breath, wheezing and stridor.    Cardiovascular: Negative for chest pain, palpitations, orthopnea, leg swelling and PND.   Gastrointestinal: Negative for abdominal pain, blood in stool, diarrhea, heartburn, nausea and vomiting.   Skin: Negative for rash.   Neurological: Negative for dizziness, tingling, tremors, sensory change, speech change, focal weakness, loss of consciousness, weakness and headaches.     All others systems reviewed and negative.     Objective:     Blood pressure 122/74, pulse 61, height 1.753 m (5' 9.02\"), weight (!) 125.2 kg (276 lb), SpO2 94 %. Body mass index is 40.73 kg/m².    Physical Exam   Constitutional: He is oriented to person, place, and time and well-developed, well-nourished, and in no distress.   HENT:   Head: Normocephalic and atraumatic.   Eyes: Pupils are equal, round, and reactive to light. Conjunctivae and EOM are normal.   Neck: Normal range of motion. Neck supple. No JVD present. No tracheal deviation present.   Cardiovascular: Normal rate, regular rhythm, normal heart sounds and intact distal pulses.  Exam reveals no gallop and no friction rub.    No murmur heard.  Pulses:       Radial pulses are 2+ on the right side, and 2+ on the left side.        Dorsalis pedis pulses are 2+ on the right side, and 2+ on the left side.        Posterior tibial pulses are 2+ on the right side, and 2+ on the left side.   No carotid bruits bilaterally.    Pulmonary/Chest: Effort normal and breath sounds normal. No respiratory distress. He has no wheezes. He has no rales. He exhibits no tenderness.   Left chest PPM site without erythema or induration   Abdominal: Soft. Bowel sounds are normal. "   Musculoskeletal: Normal range of motion. He exhibits no edema.   Neurological: He is alert and oriented to person, place, and time.   Skin: Skin is warm and dry.   Psychiatric: Mood, memory, affect and judgment normal.         Cardiac Imaging and Procedures Review:    Echo dated 8/23/17:   CONCLUSIONS  Normal left ventricular systolic function.  Left ventricular ejection fraction is visually estimated to be 55%.  Normal diastolic function.  Mildly dilated right ventricle.  The right atrium is normal in size.  The left atrium is normal in size.  Structurally normal mitral valve without significant stenosis or   regurgitation.  Structurally normal aortic valve without significant stenosis or   regurgitation.  Estimated right ventricular systolic pressure  is 30 mmHg.  Compared to the prior echo of 4/14/ 2014, the EF is mildly improved        Assessment:     1. PAF (paroxysmal atrial fibrillation) ([facility])  EKG    BASIC METABOLIC PANEL    MAGNESIUM   2. High risk medication use  BASIC METABOLIC PANEL    MAGNESIUM   3. Pacemaker     4. Chronic anticoagulation     5. SSS (sick sinus syndrome) ([facility])     6. S/P ablation of atrial fibrillation         Medical Decision Making:  Today's Assessment / Status / Plan:   1. PAF S/P ablation:  - He is Clinically doing well post ablation.  His pacemaker was interrogated today and reveals low burden of AF (<1 %, total time 8 min).  - I have re reviewed expected course of healing post ablation with him today.  I have discussed activity and exercise with him and he will start to gradually increase back his activities.    - He will continue Tikosyn, Toprol, and Xarelto.     2. High Risk Medication Use (Tiksoyn):  - QTc in office today 432 msec and stable.  He will get labs completed prior to his next office visit for routine renal function and electrolyte monitoring.     3. [place] PPM for SSS:  - Device is working normally on interrogation today.  - Lead thresholds and sensing are  stable.   - Battery longevity is 6.5 years.     4. Anticoagulation:  - He is tolerating well.  He denies evidence of internal bleeding.      Plan reviewed in detail with the patient and He verbalizes understanding and is in agreement.   RTC in 8 weeks for review, sooner if clinical condition changes  Collaborating MD/ADD: MIGUEL Lindsey.     SUE Rodriguez.

## 2019-01-17 LAB — EKG IMPRESSION: NORMAL

## 2019-01-28 DIAGNOSIS — I48.91 ATRIAL FIBRILLATION, UNSPECIFIED TYPE (HCC): ICD-10-CM

## 2019-02-21 ENCOUNTER — TELEPHONE (OUTPATIENT)
Dept: CARDIOLOGY | Facility: MEDICAL CENTER | Age: 59
End: 2019-02-21

## 2019-02-27 ENCOUNTER — TELEPHONE (OUTPATIENT)
Dept: CARDIOLOGY | Facility: MEDICAL CENTER | Age: 59
End: 2019-02-27

## 2019-02-27 NOTE — TELEPHONE ENCOUNTER
SUE Rodriguez.   You 6 hours ago (7:27 AM)      Last time I saw him I told him that he needed to get a PCP and could only fill once for him.   THank you,   Roberta  (Routing comment)       You   SMITH RodriguezPMaryuriRCOLE 8 days ago      Not sure if you intended to fill long term or get from PCP?  (Routing comment)       Stephanie Sorto, Med Ass't   You 8 days ago      Continue to fill under EA? (Routing comment)      rx denied, get from PCP

## 2019-03-12 RX ORDER — ALLOPURINOL 300 MG/1
300 TABLET ORAL
Qty: 30 TAB | Refills: 0 | Status: CANCELLED | OUTPATIENT
Start: 2019-03-12

## 2019-03-12 NOTE — TELEPHONE ENCOUNTER
Pt has has May 2 (changed to 3/13 @ 9:40) to establish with you. His dr (Kavin) was rx'ing it and he has no one to RF. Please advise.       Allopurinol    Was the patient seen in the last year in this department? No     Does patient have an active prescription for medications requested? No     Received Request Via: Patient

## 2019-03-13 ENCOUNTER — OFFICE VISIT (OUTPATIENT)
Dept: CARDIOLOGY | Facility: MEDICAL CENTER | Age: 59
End: 2019-03-13
Payer: MEDICARE

## 2019-03-13 ENCOUNTER — OFFICE VISIT (OUTPATIENT)
Dept: MEDICAL GROUP | Facility: MEDICAL CENTER | Age: 59
End: 2019-03-13
Payer: MEDICARE

## 2019-03-13 VITALS
TEMPERATURE: 97.8 F | WEIGHT: 271 LBS | HEIGHT: 69 IN | OXYGEN SATURATION: 100 % | HEART RATE: 63 BPM | SYSTOLIC BLOOD PRESSURE: 120 MMHG | RESPIRATION RATE: 16 BRPM | DIASTOLIC BLOOD PRESSURE: 76 MMHG | BODY MASS INDEX: 40.14 KG/M2

## 2019-03-13 VITALS
BODY MASS INDEX: 41.47 KG/M2 | HEART RATE: 62 BPM | HEIGHT: 69 IN | SYSTOLIC BLOOD PRESSURE: 110 MMHG | DIASTOLIC BLOOD PRESSURE: 80 MMHG | OXYGEN SATURATION: 97 % | WEIGHT: 280 LBS

## 2019-03-13 DIAGNOSIS — Z12.12 SCREENING FOR COLORECTAL CANCER: ICD-10-CM

## 2019-03-13 DIAGNOSIS — I48.0 PAF (PAROXYSMAL ATRIAL FIBRILLATION) (HCC): ICD-10-CM

## 2019-03-13 DIAGNOSIS — Z23 NEED FOR VACCINATION: ICD-10-CM

## 2019-03-13 DIAGNOSIS — Z95.0 PACEMAKER: ICD-10-CM

## 2019-03-13 DIAGNOSIS — Z98.890 S/P ABLATION OF ATRIAL FIBRILLATION: ICD-10-CM

## 2019-03-13 DIAGNOSIS — M1A.0710 IDIOPATHIC CHRONIC GOUT OF RIGHT FOOT WITHOUT TOPHUS: ICD-10-CM

## 2019-03-13 DIAGNOSIS — Z12.11 SCREENING FOR COLORECTAL CANCER: ICD-10-CM

## 2019-03-13 DIAGNOSIS — G47.33 OSA (OBSTRUCTIVE SLEEP APNEA): ICD-10-CM

## 2019-03-13 DIAGNOSIS — Z79.01 CHRONIC ANTICOAGULATION: ICD-10-CM

## 2019-03-13 DIAGNOSIS — I48.19 PERSISTENT ATRIAL FIBRILLATION (HCC): ICD-10-CM

## 2019-03-13 DIAGNOSIS — Z13.220 SCREENING FOR LIPID DISORDERS: ICD-10-CM

## 2019-03-13 DIAGNOSIS — Z51.81 VISIT FOR MONITORING TIKOSYN THERAPY: ICD-10-CM

## 2019-03-13 DIAGNOSIS — Z83.3 FAMILY HISTORY OF DIABETES MELLITUS (DM): ICD-10-CM

## 2019-03-13 DIAGNOSIS — Z79.899 VISIT FOR MONITORING TIKOSYN THERAPY: ICD-10-CM

## 2019-03-13 DIAGNOSIS — Z86.79 S/P ABLATION OF ATRIAL FIBRILLATION: ICD-10-CM

## 2019-03-13 DIAGNOSIS — Z12.5 SCREENING FOR PROSTATE CANCER: ICD-10-CM

## 2019-03-13 LAB — EKG IMPRESSION: NORMAL

## 2019-03-13 PROCEDURE — 99214 OFFICE O/P EST MOD 30 MIN: CPT | Mod: 25 | Performed by: NURSE PRACTITIONER

## 2019-03-13 PROCEDURE — 90732 PPSV23 VACC 2 YRS+ SUBQ/IM: CPT | Performed by: NURSE PRACTITIONER

## 2019-03-13 PROCEDURE — G0009 ADMIN PNEUMOCOCCAL VACCINE: HCPCS | Performed by: NURSE PRACTITIONER

## 2019-03-13 PROCEDURE — 93288 INTERROG EVL PM/LDLS PM IP: CPT | Performed by: NURSE PRACTITIONER

## 2019-03-13 PROCEDURE — 93000 ELECTROCARDIOGRAM COMPLETE: CPT | Performed by: INTERNAL MEDICINE

## 2019-03-13 RX ORDER — ALLOPURINOL 300 MG/1
300 TABLET ORAL DAILY
Qty: 90 TAB | Refills: 3 | Status: SHIPPED | OUTPATIENT
Start: 2019-03-13 | End: 2020-03-04

## 2019-03-13 ASSESSMENT — ENCOUNTER SYMPTOMS
BLOOD IN STOOL: 0
HEARTBURN: 0
FOCAL WEAKNESS: 0
SPEECH CHANGE: 0
VOMITING: 0
CONSTIPATION: 0
PND: 0
CHILLS: 0
HEMOPTYSIS: 0
COUGH: 0
WEIGHT LOSS: 0
BLURRED VISION: 0
HEADACHES: 0
DIZZINESS: 0
DIARRHEA: 0
DOUBLE VISION: 0
ORTHOPNEA: 0
WEAKNESS: 0
ABDOMINAL PAIN: 0
FEVER: 0
PALPITATIONS: 0
SORE THROAT: 0
LOSS OF CONSCIOUSNESS: 0
TREMORS: 0
TINGLING: 0
STRIDOR: 0
WHEEZING: 0
SPUTUM PRODUCTION: 0
SENSORY CHANGE: 0
SHORTNESS OF BREATH: 0
NAUSEA: 0

## 2019-03-13 NOTE — PROGRESS NOTES
Juan Martel is a 58 y.o. male here to establish care:    HPI:    ARCHANA (obstructive sleep apnea)  Using CPAP nightly for the past 2 years.     Atrial fibrillation (CMS-HCC)  Established with cardiology. Currently on Metoprolol, xarelto, and Tikosyn. Pacer in place. No chest pain, shortness of breath, exercise intolerance unless he is overdoing exercise.     Idiopathic chronic gout of right foot without tophus  Chronic.  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  He is requesting a refill for this today.  He also has a history of uric acid kidney stones.    Current medicines (including changes today)  Current Outpatient Prescriptions   Medication Sig Dispense Refill   • allopurinol (ZYLOPRIM) 300 MG Tab Take 1 Tab by mouth every day. 90 Tab 3   • rivaroxaban (XARELTO) 20 MG Tab tablet Take 1 Tab by mouth with dinner. 30 Tab 6   • dofetilide (TIKOSYN) 500 MCG Cap Take 1 Cap by mouth every 12 hours. 60 Cap 3   • metoprolol SR (TOPROL XL) 25 MG TABLET SR 24 HR Take 1 Tab by mouth 2 Times a Day. 60 Tab 6   • Magnesium 500 MG Tab Take 1 Cap by mouth every day.       No current facility-administered medications for this visit.      He  has a past medical history of A-fib (HCC); At risk for sleep apnea; Atrial fibrillation (HCC); Cardiac asystole (HCC) (12/20/16); Cardiac pacemaker in situ (12/20/2016); Gout; Idiopathic chronic gout of right foot without tophus (3/13/2019); Pneumonia (2000); and Sleep apnea. He also has no past medical history of Asthma; Disorder of thyroid; or Heart valve disease.  He  has a past surgical history that includes other orthopedic surgery (6/1/1990) and other cardiac surgery (12/20/16).  Social History   Substance Use Topics   • Smoking status: Never Smoker   • Smokeless tobacco: Never Used   • Alcohol use 0.0 oz/week      Comment: occasional     Social History     Social History  "Narrative   • No narrative on file     Family History   Problem Relation Age of Onset   • Heart Disease Mother         atrial fib   • Stroke Father 65        CVa   • Diabetes Father    • Cancer Sister         skin   • No Known Problems Brother    • Stroke Paternal Grandmother      Family Status   Relation Status   • Mo Alive   • Fa  at age 67        dibetes   • Sis Alive   • Bro Alive   • PGMo (Not Specified)         ROS  No chest pain, no abdominal pain, no rash.  Positive ROS as per HPI.  All other systems reviewed and are negative      Objective:     Blood pressure 120/76, pulse 63, temperature 36.6 °C (97.8 °F), temperature source Temporal, resp. rate 16, height 1.753 m (5' 9.02\"), weight 122.9 kg (271 lb), SpO2 100 %. Body mass index is 40 kg/m².     Physical Exam:    Constitutional: Alert, no distress.  Skin: Warm, dry, good turgor, no rashes in visible areas.  Eye: Equal, round, conjunctiva clear, lids normal.  ENMT: Lips without lesions, good dentition  Neck: Trachea midline  Respiratory: Unlabored respiratory effort, lungs clear to auscultation, no wheezes, no ronchi.  Cardiovascular: Normal S1, S2, no murmur, no edema.  Psych: Alert and oriented x3, normal affect and mood.      Assessment and Plan:   The following treatment plan was discussed    1. Persistent atrial fibrillation (HCC)  Stable  Continue follow-up with cardiology  Continue dofetilide 500 mcg twice daily  Continue metoprolol 25 mg twice daily  Continue Xarelto 20 mg nightly  Check TSH  - TSH WITH REFLEX TO FT4; Future    2. Idiopathic chronic gout of right foot without tophus  Stable  Check uric acid  Refilled allopurinol  - URIC ACID; Future  - allopurinol (ZYLOPRIM) 300 MG Tab; Take 1 Tab by mouth every day.  Dispense: 90 Tab; Refill: 3    3. ARCHANA (obstructive sleep apnea)  Stable  Continue CPAP use nightly    4. Family history of diabetes mellitus (DM)  Check fasting glucose    5. Screening for prostate cancer  - PSA TOTAL + %FREE; " Future    6. Screening for lipid disorders  - Lipid Profile; Future    7. Screening for colorectal cancer  Patient declines colonoscopy due to blood thinner use  Berenice guard ordered  - COLOGUARD (FIT DNA)    8. Need for vaccination  - Pneumococal Polysaccharide Vaccine 23-Valent =>1YO SQ/IM      Followup: Return in about 3 months (around 6/13/2019) for Review Labs.    I have placed the below orders and discussed them with an approved delegating provider. The MA is performing the below orders under the direction of Dr. Miller

## 2019-03-13 NOTE — ASSESSMENT & PLAN NOTE
Established with cardiology. Currently on Metoprolol, xarelto, and Tikosyn. Pacer in place. No chest pain, shortness of breath, exercise intolerance unless he is overdoing exercise.

## 2019-03-13 NOTE — PROGRESS NOTES
Cardiology/Electrophysiology Follow-up Note      Subjective:   Chief Complaint:   Chief Complaint   Patient presents with   • Atrial Fibrillation     F/V DX:PAF       Juan Martel is a 58 y.o. male who presents today for follow up and review of his cardiac status and  atrial fibrillation.     He is a previous patient of Dr. Raza, now followed by Dr. Faith.  Past medical history also significant for SSS S/P [place] dual chamber PPM, Atrial fibrillation S/ P ablation x2, ARCHANA.     Today in follow up he tells me that he has done well and is not aware of any arrhythmias since our last office visit.  He has been feleing well.  Has not gone back to the gym yet- but would like to.  He has been walking the dog and getting about 6000 steps a day that way.  He has also been active volunteering.    He denies chest pain, dizziness, palpitations, pre syncope or syncope, dyspnea, PND, orthopnea, or lower extremity edema.      Patient endorses medication compliance and compliance with his CPAP.  He met with new PCP today, states was a positive experience.      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/13/2019   • Pneumonia 2000   • Sleep apnea     CPAP     Past Surgical History:   Procedure Laterality Date   • OTHER CARDIAC SURGERY  12/20/16    pacemaker   • OTHER ORTHOPEDIC SURGERY  6/1/1990    achilles tendon repair left foot     Family History   Problem Relation Age of Onset   • Heart Disease Mother         atrial fib   • Stroke Father 65        CVa   • Diabetes Father    • Cancer Sister         skin   • No Known Problems Brother    • Stroke Paternal Grandmother      Social History     Social History   • Marital status:      Spouse name: N/A   • Number of children: N/A   • Years of  "education: N/A     Occupational History   • Not on file.     Social History Main Topics   • Smoking status: Never Smoker   • Smokeless tobacco: Never Used   • Alcohol use 0.0 oz/week      Comment: occasional   • Drug use: No   • Sexual activity: Not on file     Other Topics Concern   • Not on file     Social History Narrative   • No narrative on file     Allergies   Allergen Reactions   • Bloodless    • Codeine Vomiting   • Tramadol Vomiting and Nausea       Current Outpatient Prescriptions   Medication Sig Dispense Refill   • allopurinol (ZYLOPRIM) 300 MG Tab Take 1 Tab by mouth every day. 90 Tab 3   • rivaroxaban (XARELTO) 20 MG Tab tablet Take 1 Tab by mouth with dinner. 30 Tab 6   • dofetilide (TIKOSYN) 500 MCG Cap Take 1 Cap by mouth every 12 hours. 60 Cap 3   • metoprolol SR (TOPROL XL) 25 MG TABLET SR 24 HR Take 1 Tab by mouth 2 Times a Day. 60 Tab 6   • Magnesium 500 MG Tab Take 1 Cap by mouth every day.       No current facility-administered medications for this visit.        Review of Systems   Constitutional: Negative for chills, fever, malaise/fatigue and weight loss.   HENT: Negative for congestion, nosebleeds, sore throat and tinnitus.    Eyes: Negative for blurred vision and double vision.   Respiratory: Negative for cough, hemoptysis, sputum production, shortness of breath, wheezing and stridor.    Cardiovascular: Negative for chest pain, palpitations, orthopnea, leg swelling and PND.   Gastrointestinal: Negative for abdominal pain, blood in stool, constipation, diarrhea, heartburn, melena, nausea and vomiting.   Genitourinary: Negative for hematuria.   Skin: Negative for rash.   Neurological: Negative for dizziness, tingling, tremors, sensory change, speech change, focal weakness, loss of consciousness, weakness and headaches.     All others systems reviewed and negative.     Objective:     Blood pressure 110/80, pulse 62, height 1.753 m (5' 9\"), weight (!) 127 kg (280 lb), SpO2 97 %. Body mass " index is 41.35 kg/m².    Physical Exam   Constitutional: He is oriented to person, place, and time and well-developed, well-nourished, and in no distress.   HENT:   Head: Normocephalic and atraumatic.   Eyes: Pupils are equal, round, and reactive to light. Conjunctivae and EOM are normal.   Neck: Normal range of motion. Neck supple. No JVD present. No tracheal deviation present.   Cardiovascular: Normal rate, regular rhythm, normal heart sounds and intact distal pulses.  Exam reveals no gallop and no friction rub.    No murmur heard.  Pulses:       Radial pulses are 2+ on the right side, and 2+ on the left side.        Dorsalis pedis pulses are 2+ on the right side, and 2+ on the left side.        Posterior tibial pulses are 2+ on the right side, and 2+ on the left side.       Pulmonary/Chest: Effort normal and breath sounds normal. No respiratory distress. He has no wheezes. He has no rales. He exhibits no tenderness.   Left chest PPM site without erythema or evidence of erosion.    Abdominal: Soft. Bowel sounds are normal.   Musculoskeletal: Normal range of motion. He exhibits no edema.   Neurological: He is alert and oriented to person, place, and time.   Skin: Skin is warm and dry.   Psychiatric: Mood, memory, affect and judgment normal.         Cardiac Imaging and Procedures Review:    Echo dated 8/23/17:   CONCLUSIONS  Normal left ventricular systolic function.  Left ventricular ejection fraction is visually estimated to be 55%.  Normal diastolic function.  Mildly dilated right ventricle.  The right atrium is normal in size.  The left atrium is normal in size.  Structurally normal mitral valve without significant stenosis or   regurgitation.  Structurally normal aortic valve without significant stenosis or   regurgitation.  Estimated right ventricular systolic pressure  is 30 mmHg.  Compared to the prior echo of 4/14/ 2014, the EF is mildly improved    EPS/ABLATION dated:  rocedural conclusions per Dr. Faith's  op note:  Procedure(s) Performed:   1) Atrial fibrillation ablation  2) Additional atrial fibrillation ablation (CFAEs along anterior wall)  3) Ablation of secondary mechanism of tachycardia (LA roof line and floor line)  4) 3D mapping  5) ICE during diagnostics and intervention  6) Arrhythmia induction with IV drug infusion  7) Periprocedural device programing  Indication(s):  Persistent AF  Complications:  None  Total ablation time: 2545 seconds  Electrophysiologic Findings:    1. Sinus  694 ms, HV 42 ms. AVBCL = 280 ms. AERP 600/220 msec. AVNERP 600/320/220.  Impressions:    1. Persistent atrial fibrillation.    2. Successful RF ablation pulmonary vein isolation procedure.    3. Successful RF roof and floor line of LA  4. Additional RF ablation of anterior wall CFAEs           Assessment:     1. PAF (paroxysmal atrial fibrillation) ([facility])  EKG   2. Visit for monitoring Tikosyn therapy     3. S/P ablation of atrial fibrillation     4. Pacemaker     5. Chronic anticoagulation         Medical Decision Making:  Today's Assessment / Status / Plan:   1. PAF S/P ablation:  - Clinically continues to do well post ablation.  He has no AF episodes logged in his device today on interrogation.  - He will continue Tikosyn, Metoprolol and Xarelto.  - Advised to start going back to the gym- gradually build up activity/length of time exercising.     2. High Risk Medication Use (Tiksoyn):  - QTc in office today 430 msec and stable.  He has not gotten labs done, but will do.     3. [place] PPM for SSS:  - device interrogated in office today.  Normal RA and RV  sensing and threshold.   - No episodes in device as above.   - Battery longevity 6.5 yrs.     4. Anticoagulation:  - He is tolerating well.  He denies evidence of internal bleeding.      Plan reviewed in detail with the patient and He verbalizes understanding and is in agreement.   RTC in 3-4 months for reivew, sooner if clinical condition changes  Collaborating MD/ADD:  Mik.     ALEX Rodriguez

## 2019-03-13 NOTE — LETTER
[facility]   [address], [address]  [place] [address]  Phone: [phone]  Fax: [phone]              Juan Martel  1960    Encounter Date: 3/13/2019    ALEX Rodriguez          PROGRESS NOTE:  Cardiology/Electrophysiology Follow-up Note      Subjective:   Chief Complaint:   Chief Complaint   Patient presents with   • Atrial Fibrillation     F/V DX:PAF       Juan Martel is a 58 y.o. male who presents today for follow up and review of his cardiac status and  atrial fibrillation.     He is a previous patient of Dr. Raza, now followed by Dr. Faith.  Past medical history also significant for SSS S/P [place] dual chamber PPM, Atrial fibrillation S/ P ablation x2, ARCHANA.     Today in follow up he tells me that he has done well and is not aware of any arrhythmias since our last office visit.  He has been feleing well.  Has not gone back to the gym yet- but would like to.  He has been walking the dog and getting about 6000 steps a day that way.  He has also been active volunteering.    He denies chest pain, dizziness, palpitations, pre syncope or syncope, dyspnea, PND, orthopnea, or lower extremity edema.      Patient endorses medication compliance and compliance with his CPAP.  He met with new PCP today, states was a positive experience.      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/13/2019   • Pneumonia 2000   • Sleep apnea     CPAP     Past Surgical History:   Procedure Laterality Date   • OTHER CARDIAC SURGERY  12/20/16    pacemaker   • OTHER ORTHOPEDIC SURGERY  6/1/1990    achilles tendon repair left foot     Family History   Problem Relation Age of Onset   • Heart Disease Mother         atrial fib   •  Stroke Father 65        CVa   • Diabetes Father    • Cancer Sister         skin   • No Known Problems Brother    • Stroke Paternal Grandmother      Social History     Social History   • Marital status:      Spouse name: N/A   • Number of children: N/A   • Years of education: N/A     Occupational History   • Not on file.     Social History Main Topics   • Smoking status: Never Smoker   • Smokeless tobacco: Never Used   • Alcohol use 0.0 oz/week      Comment: occasional   • Drug use: No   • Sexual activity: Not on file     Other Topics Concern   • Not on file     Social History Narrative   • No narrative on file     Allergies   Allergen Reactions   • Bloodless    • Codeine Vomiting   • Tramadol Vomiting and Nausea       Current Outpatient Prescriptions   Medication Sig Dispense Refill   • allopurinol (ZYLOPRIM) 300 MG Tab Take 1 Tab by mouth every day. 90 Tab 3   • rivaroxaban (XARELTO) 20 MG Tab tablet Take 1 Tab by mouth with dinner. 30 Tab 6   • dofetilide (TIKOSYN) 500 MCG Cap Take 1 Cap by mouth every 12 hours. 60 Cap 3   • metoprolol SR (TOPROL XL) 25 MG TABLET SR 24 HR Take 1 Tab by mouth 2 Times a Day. 60 Tab 6   • Magnesium 500 MG Tab Take 1 Cap by mouth every day.       No current facility-administered medications for this visit.        Review of Systems   Constitutional: Negative for chills, fever, malaise/fatigue and weight loss.   HENT: Negative for congestion, nosebleeds, sore throat and tinnitus.    Eyes: Negative for blurred vision and double vision.   Respiratory: Negative for cough, hemoptysis, sputum production, shortness of breath, wheezing and stridor.    Cardiovascular: Negative for chest pain, palpitations, orthopnea, leg swelling and PND.   Gastrointestinal: Negative for abdominal pain, blood in stool, constipation, diarrhea, heartburn, melena, nausea and vomiting.   Genitourinary: Negative for hematuria.   Skin: Negative for rash.   Neurological: Negative for dizziness, tingling,  "tremors, sensory change, speech change, focal weakness, loss of consciousness, weakness and headaches.     All others systems reviewed and negative.     Objective:     Blood pressure 110/80, pulse 62, height 1.753 m (5' 9\"), weight (!) 127 kg (280 lb), SpO2 97 %. Body mass index is 41.35 kg/m².    Physical Exam   Constitutional: He is oriented to person, place, and time and well-developed, well-nourished, and in no distress.   HENT:   Head: Normocephalic and atraumatic.   Eyes: Pupils are equal, round, and reactive to light. Conjunctivae and EOM are normal.   Neck: Normal range of motion. Neck supple. No JVD present. No tracheal deviation present.   Cardiovascular: Normal rate, regular rhythm, normal heart sounds and intact distal pulses.  Exam reveals no gallop and no friction rub.    No murmur heard.  Pulses:       Radial pulses are 2+ on the right side, and 2+ on the left side.        Dorsalis pedis pulses are 2+ on the right side, and 2+ on the left side.        Posterior tibial pulses are 2+ on the right side, and 2+ on the left side.       Pulmonary/Chest: Effort normal and breath sounds normal. No respiratory distress. He has no wheezes. He has no rales. He exhibits no tenderness.   Left chest PPM site without erythema or evidence of erosion.    Abdominal: Soft. Bowel sounds are normal.   Musculoskeletal: Normal range of motion. He exhibits no edema.   Neurological: He is alert and oriented to person, place, and time.   Skin: Skin is warm and dry.   Psychiatric: Mood, memory, affect and judgment normal.         Cardiac Imaging and Procedures Review:    Echo dated 8/23/17:   CONCLUSIONS  Normal left ventricular systolic function.  Left ventricular ejection fraction is visually estimated to be 55%.  Normal diastolic function.  Mildly dilated right ventricle.  The right atrium is normal in size.  The left atrium is normal in size.  Structurally normal mitral valve without significant stenosis or "   regurgitation.  Structurally normal aortic valve without significant stenosis or   regurgitation.  Estimated right ventricular systolic pressure  is 30 mmHg.  Compared to the prior echo of 4/14/ 2014, the EF is mildly improved    EPS/ABLATION dated:  rocedural conclusions per Dr. Faith's op note:  Procedure(s) Performed:   1) Atrial fibrillation ablation  2) Additional atrial fibrillation ablation (CFAEs along anterior wall)  3) Ablation of secondary mechanism of tachycardia (LA roof line and floor line)  4) 3D mapping  5) ICE during diagnostics and intervention  6) Arrhythmia induction with IV drug infusion  7) Periprocedural device programing  Indication(s):  Persistent AF  Complications:  None  Total ablation time: 2545 seconds  Electrophysiologic Findings:    1. Sinus  694 ms, HV 42 ms. AVBCL = 280 ms. AERP 600/220 msec. AVNERP 600/320/220.  Impressions:    1. Persistent atrial fibrillation.    2. Successful RF ablation pulmonary vein isolation procedure.    3. Successful RF roof and floor line of LA  4. Additional RF ablation of anterior wall CFAEs           Assessment:     1. PAF (paroxysmal atrial fibrillation) ([facility])  EKG   2. Visit for monitoring Tikosyn therapy     3. S/P ablation of atrial fibrillation     4. Pacemaker     5. Chronic anticoagulation         Medical Decision Making:  Today's Assessment / Status / Plan:   1. PAF S/P ablation:  - Clinically continues to do well post ablation.  He has no AF episodes logged in his device today on interrogation.  - He will continue Tikosyn, Metoprolol and Xarelto.  - Advised to start going back to the gym- gradually build up activity/length of time exercising.     2. High Risk Medication Use (Tiksoyn):  - QTc in office today 430 msec and stable.  He has not gotten labs done, but will do.     3. [place] PPM for SSS:  - device interrogated in office today.  Normal RA and RV  sensing and threshold.   - No episodes in device as above.   - Battery longevity 6.5 yrs.      4. Anticoagulation:  - He is tolerating well.  He denies evidence of internal bleeding.      Plan reviewed in detail with the patient and He verbalizes understanding and is in agreement.   RTC in 3-4 months for reivew, sooner if clinical condition changes  Collaborating MD/ADD: Mik.     ALEX Rodriguez A.P.R.N.  [id number] [facility]  [address]  [place] [address]  VIA In Basket

## 2019-03-13 NOTE — ASSESSMENT & PLAN NOTE
Chronic.  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  He is requesting a refill for this today.  He also has a history of uric acid kidney stones.

## 2019-04-24 DIAGNOSIS — I48.19 PERSISTENT ATRIAL FIBRILLATION (HCC): ICD-10-CM

## 2019-04-24 RX ORDER — DOFETILIDE 0.5 MG/1
500 CAPSULE ORAL EVERY 12 HOURS
Qty: 180 CAP | Refills: 3 | Status: SHIPPED | OUTPATIENT
Start: 2019-04-24 | End: 2020-05-26

## 2019-06-17 ENCOUNTER — HOSPITAL ENCOUNTER (OUTPATIENT)
Dept: LAB | Facility: MEDICAL CENTER | Age: 59
End: 2019-06-17
Attending: NURSE PRACTITIONER
Payer: MEDICARE

## 2019-06-17 DIAGNOSIS — Z12.5 SCREENING FOR PROSTATE CANCER: ICD-10-CM

## 2019-06-17 DIAGNOSIS — I48.19 PERSISTENT ATRIAL FIBRILLATION (HCC): ICD-10-CM

## 2019-06-17 DIAGNOSIS — Z13.220 SCREENING FOR LIPID DISORDERS: ICD-10-CM

## 2019-06-17 DIAGNOSIS — M1A.0710 IDIOPATHIC CHRONIC GOUT OF RIGHT FOOT WITHOUT TOPHUS: ICD-10-CM

## 2019-06-17 LAB
CHOLEST SERPL-MCNC: 192 MG/DL (ref 100–199)
FASTING STATUS PATIENT QL REPORTED: NORMAL
HDLC SERPL-MCNC: 34 MG/DL
LDLC SERPL CALC-MCNC: 141 MG/DL
TRIGL SERPL-MCNC: 87 MG/DL (ref 0–149)
TSH SERPL DL<=0.005 MIU/L-ACNC: 2.15 UIU/ML (ref 0.38–5.33)
URATE SERPL-MCNC: 6.4 MG/DL (ref 2.5–8.3)

## 2019-06-17 PROCEDURE — 84154 ASSAY OF PSA FREE: CPT

## 2019-06-17 PROCEDURE — 84550 ASSAY OF BLOOD/URIC ACID: CPT

## 2019-06-17 PROCEDURE — 84153 ASSAY OF PSA TOTAL: CPT | Mod: GA

## 2019-06-17 PROCEDURE — 36415 COLL VENOUS BLD VENIPUNCTURE: CPT

## 2019-06-17 PROCEDURE — 80061 LIPID PANEL: CPT | Mod: GA

## 2019-06-17 PROCEDURE — 84443 ASSAY THYROID STIM HORMONE: CPT

## 2019-06-18 LAB
PSA FREE MFR SERPL: 50 %
PSA FREE SERPL-MCNC: 0.2 NG/ML
PSA SERPL-MCNC: 0.4 NG/ML (ref 0–4)

## 2019-06-20 ENCOUNTER — TELEPHONE (OUTPATIENT)
Dept: MEDICAL GROUP | Facility: MEDICAL CENTER | Age: 59
End: 2019-06-20

## 2019-06-20 NOTE — TELEPHONE ENCOUNTER
----- Message from ALEX Fisher sent at 6/19/2019  9:21 PM PDT -----  Please schedule patient for follow up appointment to discuss lab results. This is not urgent.   ALEX Fisher

## 2019-06-24 DIAGNOSIS — I48.91 ATRIAL FIBRILLATION, UNSPECIFIED TYPE (HCC): ICD-10-CM

## 2019-06-24 RX ORDER — METOPROLOL SUCCINATE 25 MG/1
TABLET, EXTENDED RELEASE ORAL
Qty: 180 TAB | Refills: 3 | Status: SHIPPED | OUTPATIENT
Start: 2019-06-24 | End: 2020-06-18

## 2019-06-25 ENCOUNTER — OFFICE VISIT (OUTPATIENT)
Dept: CARDIOLOGY | Facility: MEDICAL CENTER | Age: 59
End: 2019-06-25
Payer: MEDICARE

## 2019-06-25 VITALS
HEART RATE: 64 BPM | SYSTOLIC BLOOD PRESSURE: 116 MMHG | WEIGHT: 286 LBS | BODY MASS INDEX: 42.36 KG/M2 | DIASTOLIC BLOOD PRESSURE: 74 MMHG | OXYGEN SATURATION: 95 % | HEIGHT: 69 IN

## 2019-06-25 DIAGNOSIS — I49.5 SSS (SICK SINUS SYNDROME) (HCC): ICD-10-CM

## 2019-06-25 DIAGNOSIS — I48.0 PAF (PAROXYSMAL ATRIAL FIBRILLATION) (HCC): ICD-10-CM

## 2019-06-25 DIAGNOSIS — Z98.890 H/O CARDIAC RADIOFREQUENCY ABLATION: ICD-10-CM

## 2019-06-25 DIAGNOSIS — Z79.01 CHRONIC ANTICOAGULATION: ICD-10-CM

## 2019-06-25 DIAGNOSIS — Z79.899 VISIT FOR MONITORING TIKOSYN THERAPY: ICD-10-CM

## 2019-06-25 DIAGNOSIS — Z51.81 VISIT FOR MONITORING TIKOSYN THERAPY: ICD-10-CM

## 2019-06-25 DIAGNOSIS — Z95.0 PACEMAKER: ICD-10-CM

## 2019-06-25 PROCEDURE — 93288 INTERROG EVL PM/LDLS PM IP: CPT | Performed by: NURSE PRACTITIONER

## 2019-06-25 PROCEDURE — 99214 OFFICE O/P EST MOD 30 MIN: CPT | Mod: 25 | Performed by: NURSE PRACTITIONER

## 2019-06-25 PROCEDURE — 93000 ELECTROCARDIOGRAM COMPLETE: CPT | Performed by: INTERNAL MEDICINE

## 2019-06-25 ASSESSMENT — ENCOUNTER SYMPTOMS
WEIGHT LOSS: 0
WHEEZING: 0
ABDOMINAL PAIN: 0
SORE THROAT: 0
HEADACHES: 0
NAUSEA: 0
SPEECH CHANGE: 0
BLURRED VISION: 0
SPUTUM PRODUCTION: 0
PALPITATIONS: 0
STRIDOR: 0
TREMORS: 0
VOMITING: 0
CHILLS: 0
DIARRHEA: 0
PND: 0
CONSTIPATION: 0
SHORTNESS OF BREATH: 0
DOUBLE VISION: 0
FOCAL WEAKNESS: 0
DIZZINESS: 0
FEVER: 0
ORTHOPNEA: 0
SENSORY CHANGE: 0
WEAKNESS: 0
TINGLING: 0
BLOOD IN STOOL: 0
LOSS OF CONSCIOUSNESS: 0
HEARTBURN: 0
COUGH: 0
HEMOPTYSIS: 0

## 2019-06-25 NOTE — LETTER
[facility]   [address] [facility], [facility] [address]  [place] [address]  Phone: [phone]  Fax: [phone]              Juan Martel  1960    Encounter Date: 6/25/2019    ALEX Rodriguez          PROGRESS NOTE:  Cardiology/Electrophysiology Follow-up Note      Subjective:   Chief Complaint:   Chief Complaint   Patient presents with   • Atrial Fibrillation     F/V DX:PAF       Juan Martel is a 58 y.o. male who presents today for follow up and review of his cardiac status and  atrial fibrillation and high risk medication.     He is a previous patient Dr. Faith.  Past medical history also significant for SSS S/P [place] dual chamber PPM, Atrial fibrillation, S/ P ablation x2, anticoagulation, and ARCHANA.     Today in follow up he tells me that he has done well and is not aware of any arrhythmias since our last office visit.  He has continued to feel well.  He is back at the gym 5 days a week doing weights and cardio.  He was doing Keto diet but off for a bit, getting ready to restart.      He denies chest pain, dizziness, palpitations, pre syncope or syncope, dyspnea, PND, orthopnea, or lower extremity edema.      Patient endorses medication compliance.     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/13/2019   • Pneumonia 2000   • Sleep apnea     CPAP     Past Surgical History:   Procedure Laterality Date   • OTHER CARDIAC SURGERY  12/20/16    pacemaker   • OTHER ORTHOPEDIC SURGERY  6/1/1990    achilles tendon repair left foot     Family History   Problem Relation Age of Onset   • Heart Disease Mother         atrial fib   • Stroke Father 65        CVa   • Diabetes Father    • Cancer Sister         skin   • No Known Problems  Brother    • Stroke Paternal Grandmother      Social History     Social History   • Marital status:      Spouse name: N/A   • Number of children: N/A   • Years of education: N/A     Occupational History   • Not on file.     Social History Main Topics   • Smoking status: Never Smoker   • Smokeless tobacco: Never Used   • Alcohol use 0.0 oz/week      Comment: occasional   • Drug use: No   • Sexual activity: Not on file     Other Topics Concern   • Not on file     Social History Narrative   • No narrative on file     Allergies   Allergen Reactions   • Bloodless    • Codeine Vomiting   • Tramadol Vomiting and Nausea       Current Outpatient Prescriptions   Medication Sig Dispense Refill   • metoprolol SR (TOPROL XL) 25 MG TABLET SR 24 HR TAKE 1 TABLET BY MOUTH TWICE A  Tab 3   • dofetilide (TIKOSYN) 500 MCG Cap Take 1 Cap by mouth every 12 hours. 180 Cap 3   • allopurinol (ZYLOPRIM) 300 MG Tab Take 1 Tab by mouth every day. 90 Tab 3   • rivaroxaban (XARELTO) 20 MG Tab tablet Take 1 Tab by mouth with dinner. 30 Tab 6   • Magnesium 500 MG Tab Take 1 Cap by mouth every day.       No current facility-administered medications for this visit.        Review of Systems   Constitutional: Negative for chills, fever, malaise/fatigue and weight loss.   HENT: Negative for congestion, nosebleeds, sore throat and tinnitus.    Eyes: Negative for blurred vision and double vision.   Respiratory: Negative for cough, hemoptysis, sputum production, shortness of breath, wheezing and stridor.    Cardiovascular: Negative for chest pain, palpitations, orthopnea, leg swelling and PND.   Gastrointestinal: Negative for abdominal pain, blood in stool, constipation, diarrhea, heartburn, melena, nausea and vomiting.   Genitourinary: Negative for hematuria.   Skin: Negative for rash.   Neurological: Negative for dizziness, tingling, tremors, sensory change, speech change, focal weakness, loss of consciousness, weakness and headaches.      "    All others systems reviewed and negative.     Objective:     /74 (BP Location: Left arm, Patient Position: Sitting, BP Cuff Size: Large adult)   Pulse 64   Ht 1.753 m (5' 9.02\")   Wt (!) 129.7 kg (286 lb)   SpO2 95%  Body mass index is 42.21 kg/m².    Physical Exam   Constitutional: He is oriented to person, place, and time and well-developed, well-nourished, and in no distress.   HENT:   Head: Normocephalic and atraumatic.   Eyes: Pupils are equal, round, and reactive to light. Conjunctivae and EOM are normal.   Neck: Normal range of motion. Neck supple. No JVD present. No tracheal deviation present.   Cardiovascular: Normal rate, regular rhythm, normal heart sounds and intact distal pulses.  Exam reveals no gallop and no friction rub.    No murmur heard.  Pulses:       Radial pulses are 2+ on the right side, and 2+ on the left side.        Dorsalis pedis pulses are 2+ on the right side, and 2+ on the left side.        Posterior tibial pulses are 2+ on the right side, and 2+ on the left side.       Pulmonary/Chest: Effort normal and breath sounds normal. No respiratory distress. He has no wheezes. He has no rales. He exhibits no tenderness.   Left chest PPM site without erythema or evidence of erosion.    Abdominal: Soft. Bowel sounds are normal.   Musculoskeletal: Normal range of motion. He exhibits no edema.   Neurological: He is alert and oriented to person, place, and time.   Skin: Skin is warm and dry.   Psychiatric: Mood, memory, affect and judgment normal.         Cardiac Imaging and Procedures Review:    EKG dated: 6/25/19  Sinus rhythm, rate 61.     Echo dated 8/23/17:   CONCLUSIONS  Normal left ventricular systolic function.  Left ventricular ejection fraction is visually estimated to be 55%.  Normal diastolic function.  Mildly dilated right ventricle.  The right atrium is normal in size.  The left atrium is normal in size.  Structurally normal mitral valve without significant stenosis or "   regurgitation.  Structurally normal aortic valve without significant stenosis or   regurgitation.  Estimated right ventricular systolic pressure  is 30 mmHg.  Compared to the prior echo of 4/14/ 2014, the EF is mildly improved    EPS/ABLATION dated: 11/2018  rocedural conclusions per Dr. Faith's op note:  Procedure(s) Performed:   1) Atrial fibrillation ablation  2) Additional atrial fibrillation ablation (CFAEs along anterior wall)  3) Ablation of secondary mechanism of tachycardia (LA roof line and floor line)  4) 3D mapping  5) ICE during diagnostics and intervention  6) Arrhythmia induction with IV drug infusion  7) Periprocedural device programing  Indication(s):  Persistent AF  Complications:  None  Total ablation time: 2545 seconds  Electrophysiologic Findings:    1. Sinus  694 ms, HV 42 ms. AVBCL = 280 ms. AERP 600/220 msec. AVNERP 600/320/220.  Impressions:    1. Persistent atrial fibrillation.    2. Successful RF ablation pulmonary vein isolation procedure.    3. Successful RF roof and floor line of LA  4. Additional RF ablation of anterior wall CFAEs           Assessment:     1. PAF (paroxysmal atrial fibrillation) ([facility])  EKG   2. Visit for monitoring Tikosyn therapy     3. Pacemaker     4. Chronic anticoagulation     5. SSS (sick sinus syndrome) ([facility])     6. H/O cardiac radiofrequency ablation         Medical Decision Making:  Today's Assessment / Status / Plan:   1. PAF S/P ablation:  - Continued to do well overall after second ablation.  He does not report any significant arrhythmia evens.  BSI PPM with brief arrhythmia (longest 17 seconds).    - He will continue Tikosyn, Metoprolol and Xarelto.  I have discussed with him that I will ask Dr. Faith about weaning plan for Tikosyn and if/when to start.   - Recent TSH normal.     2. High Risk Medication Use (Tiksoyn):  - QTc in office today 440 msec and stable.      3. [place] PPM for SSS:  - device interrogated in office today.  Normal RA and RV  Lead  function.   - Brief episodes in device logged as above.  - Battery longevity 6 yrs.     4. Anticoagulation:  - He is tolerating well.  He denies evidence of internal bleeding.      He will continue to work out and restart diet.  Advised to try to consider heart healthy choices with Keto.  LDL up to 141, discussed diet changes.     Plan reviewed in detail with the patient and He verbalizes understanding and is in agreement.   RTC in 6 months for reivew, sooner if clinical condition changes  Collaborating MD/ADD: CLAUDIO Irving.P.[email or web address].          SMITH FisherP.R.N.  [address]  [address]  [facility] [address]  VIA In Basket

## 2019-06-25 NOTE — PROGRESS NOTES
Cardiology/Electrophysiology Follow-up Note      Subjective:   Chief Complaint:   Chief Complaint   Patient presents with   • Atrial Fibrillation     F/V DX:PAF       Juan Martel is a 58 y.o. male who presents today for follow up and review of his cardiac status and  atrial fibrillation and high risk medication.     He is a previous patient Dr. Faith.  Past medical history also significant for SSS S/P [place] dual chamber PPM, Atrial fibrillation, S/ P ablation x2, anticoagulation, and ARCHANA.     Today in follow up he tells me that he has done well and is not aware of any arrhythmias since our last office visit.  He has continued to feel well.  He is back at the gym 5 days a week doing weights and cardio.  He was doing Keto diet but off for a bit, getting ready to restart.      He denies chest pain, dizziness, palpitations, pre syncope or syncope, dyspnea, PND, orthopnea, or lower extremity edema.      Patient endorses medication compliance.     Past Medical History:   Diagnosis Date   • A-fib (HCC)    • At risk for sleep apnea    • Atrial fibrillation (HCC)    • Cardiac asystole ([facility]) 12/20/16    possible vaso-vagal   • Cardiac pacemaker in situ 12/20/2016   • Gout    • Idiopathic chronic gout of right foot without tophus 3/13/2019   • Pneumonia 2000   • Sleep apnea     CPAP     Past Surgical History:   Procedure Laterality Date   • OTHER CARDIAC SURGERY  12/20/16    pacemaker   • OTHER ORTHOPEDIC SURGERY  6/1/1990    achilles tendon repair left foot     Family History   Problem Relation Age of Onset   • Heart Disease Mother         atrial fib   • Stroke Father 65        CVa   • Diabetes Father    • Cancer Sister         skin   • No Known Problems Brother    • Stroke Paternal Grandmother      Social History     Social History   • Marital status:      Spouse name: N/A   • Number of children: N/A   • Years of education: N/A     Occupational History   • Not on file.     Social History Main Topics   • Smoking  "status: Never Smoker   • Smokeless tobacco: Never Used   • Alcohol use 0.0 oz/week      Comment: occasional   • Drug use: No   • Sexual activity: Not on file     Other Topics Concern   • Not on file     Social History Narrative   • No narrative on file     Allergies   Allergen Reactions   • Bloodless    • Codeine Vomiting   • Tramadol Vomiting and Nausea       Current Outpatient Prescriptions   Medication Sig Dispense Refill   • metoprolol SR (TOPROL XL) 25 MG TABLET SR 24 HR TAKE 1 TABLET BY MOUTH TWICE A  Tab 3   • dofetilide (TIKOSYN) 500 MCG Cap Take 1 Cap by mouth every 12 hours. 180 Cap 3   • allopurinol (ZYLOPRIM) 300 MG Tab Take 1 Tab by mouth every day. 90 Tab 3   • rivaroxaban (XARELTO) 20 MG Tab tablet Take 1 Tab by mouth with dinner. 30 Tab 6   • Magnesium 500 MG Tab Take 1 Cap by mouth every day.       No current facility-administered medications for this visit.        Review of Systems   Constitutional: Negative for chills, fever, malaise/fatigue and weight loss.   HENT: Negative for congestion, nosebleeds, sore throat and tinnitus.    Eyes: Negative for blurred vision and double vision.   Respiratory: Negative for cough, hemoptysis, sputum production, shortness of breath, wheezing and stridor.    Cardiovascular: Negative for chest pain, palpitations, orthopnea, leg swelling and PND.   Gastrointestinal: Negative for abdominal pain, blood in stool, constipation, diarrhea, heartburn, melena, nausea and vomiting.   Genitourinary: Negative for hematuria.   Skin: Negative for rash.   Neurological: Negative for dizziness, tingling, tremors, sensory change, speech change, focal weakness, loss of consciousness, weakness and headaches.     All others systems reviewed and negative.     Objective:     /74 (BP Location: Left arm, Patient Position: Sitting, BP Cuff Size: Large adult)   Pulse 64   Ht 1.753 m (5' 9.02\")   Wt (!) 129.7 kg (286 lb)   SpO2 95%  Body mass index is 42.21 " kg/m².    Physical Exam   Constitutional: He is oriented to person, place, and time and well-developed, well-nourished, and in no distress.   HENT:   Head: Normocephalic and atraumatic.   Eyes: Pupils are equal, round, and reactive to light. Conjunctivae and EOM are normal.   Neck: Normal range of motion. Neck supple. No JVD present. No tracheal deviation present.   Cardiovascular: Normal rate, regular rhythm, normal heart sounds and intact distal pulses.  Exam reveals no gallop and no friction rub.    No murmur heard.  Pulses:       Radial pulses are 2+ on the right side, and 2+ on the left side.        Dorsalis pedis pulses are 2+ on the right side, and 2+ on the left side.        Posterior tibial pulses are 2+ on the right side, and 2+ on the left side.       Pulmonary/Chest: Effort normal and breath sounds normal. No respiratory distress. He has no wheezes. He has no rales. He exhibits no tenderness.   Left chest PPM site without erythema or evidence of erosion.    Abdominal: Soft. Bowel sounds are normal.   Musculoskeletal: Normal range of motion. He exhibits no edema.   Neurological: He is alert and oriented to person, place, and time.   Skin: Skin is warm and dry.   Psychiatric: Mood, memory, affect and judgment normal.         Cardiac Imaging and Procedures Review:    EKG dated: 6/25/19  Sinus rhythm, rate 61.     Echo dated 8/23/17:   CONCLUSIONS  Normal left ventricular systolic function.  Left ventricular ejection fraction is visually estimated to be 55%.  Normal diastolic function.  Mildly dilated right ventricle.  The right atrium is normal in size.  The left atrium is normal in size.  Structurally normal mitral valve without significant stenosis or   regurgitation.  Structurally normal aortic valve without significant stenosis or   regurgitation.  Estimated right ventricular systolic pressure  is 30 mmHg.  Compared to the prior echo of 4/14/ 2014, the EF is mildly improved    EPS/ABLATION dated:  11/2018  rocedural conclusions per Dr. Faith's op note:  Procedure(s) Performed:   1) Atrial fibrillation ablation  2) Additional atrial fibrillation ablation (CFAEs along anterior wall)  3) Ablation of secondary mechanism of tachycardia (LA roof line and floor line)  4) 3D mapping  5) ICE during diagnostics and intervention  6) Arrhythmia induction with IV drug infusion  7) Periprocedural device programing  Indication(s):  Persistent AF  Complications:  None  Total ablation time: 2545 seconds  Electrophysiologic Findings:    1. Sinus  694 ms, HV 42 ms. AVBCL = 280 ms. AERP 600/220 msec. AVNERP 600/320/220.  Impressions:    1. Persistent atrial fibrillation.    2. Successful RF ablation pulmonary vein isolation procedure.    3. Successful RF roof and floor line of LA  4. Additional RF ablation of anterior wall CFAEs           Assessment:     1. PAF (paroxysmal atrial fibrillation) ([facility])  EKG   2. Visit for monitoring Tikosyn therapy     3. Pacemaker     4. Chronic anticoagulation     5. SSS (sick sinus syndrome) ([facility])     6. H/O cardiac radiofrequency ablation         Medical Decision Making:  Today's Assessment / Status / Plan:   1. PAF S/P ablation:  - Continued to do well overall after second ablation.  He does not report any significant arrhythmia evens.  BSI PPM with brief arrhythmia (longest 17 seconds).    - He will continue Tikosyn, Metoprolol and Xarelto.  I have discussed with him that I will ask Dr. Faith about weaning plan for Tikosyn and if/when to start.   - Recent TSH normal.     2. High Risk Medication Use (Tiksoyn):  - QTc in office today 440 msec and stable.      3. [place] PPM for SSS:  - device interrogated in office today.  Normal RA and RV  Lead function.   - Brief episodes in device logged as above.  - Battery longevity 6 yrs.     4. Anticoagulation:  - He is tolerating well.  He denies evidence of internal bleeding.      He will continue to work out and restart diet.  Advised to try to consider  heart healthy choices with Keto.  LDL up to 141, discussed diet changes.     Plan reviewed in detail with the patient and He verbalizes understanding and is in agreement.   RTC in 6 months for reivew, sooner if clinical condition changes  Collaborating MD/ADD: Raissa .     SUE Rodriguez.

## 2019-07-02 LAB — EKG IMPRESSION: NORMAL

## 2019-12-17 ENCOUNTER — OFFICE VISIT (OUTPATIENT)
Dept: CARDIOLOGY | Facility: MEDICAL CENTER | Age: 59
End: 2019-12-17
Payer: MEDICARE

## 2019-12-17 VITALS
OXYGEN SATURATION: 96 % | DIASTOLIC BLOOD PRESSURE: 74 MMHG | WEIGHT: 286 LBS | HEIGHT: 69 IN | SYSTOLIC BLOOD PRESSURE: 134 MMHG | BODY MASS INDEX: 42.36 KG/M2 | HEART RATE: 74 BPM

## 2019-12-17 DIAGNOSIS — Z79.01 CHRONIC ANTICOAGULATION: ICD-10-CM

## 2019-12-17 DIAGNOSIS — Z95.0 CARDIAC PACEMAKER IN SITU: ICD-10-CM

## 2019-12-17 DIAGNOSIS — Z86.79 S/P ABLATION OF ATRIAL FIBRILLATION: ICD-10-CM

## 2019-12-17 DIAGNOSIS — Z79.899 ENCOUNTER FOR MONITORING DOFETILIDE THERAPY: ICD-10-CM

## 2019-12-17 DIAGNOSIS — Z98.890 S/P ABLATION OF ATRIAL FIBRILLATION: ICD-10-CM

## 2019-12-17 DIAGNOSIS — Z51.81 ENCOUNTER FOR MONITORING DOFETILIDE THERAPY: ICD-10-CM

## 2019-12-17 DIAGNOSIS — G47.33 OSA (OBSTRUCTIVE SLEEP APNEA): ICD-10-CM

## 2019-12-17 DIAGNOSIS — I48.19 PERSISTENT ATRIAL FIBRILLATION (HCC): ICD-10-CM

## 2019-12-17 DIAGNOSIS — E66.01 MORBID OBESITY (HCC): ICD-10-CM

## 2019-12-17 PROCEDURE — 99214 OFFICE O/P EST MOD 30 MIN: CPT | Mod: 25 | Performed by: INTERNAL MEDICINE

## 2019-12-17 PROCEDURE — 93280 PM DEVICE PROGR EVAL DUAL: CPT | Performed by: INTERNAL MEDICINE

## 2019-12-17 NOTE — PROGRESS NOTES
Arrhythmia Clinic Note (Established patient)    DOS: 12/17/2019    Chief complaint/Reason for consult: F/u AF    Interval History:  Patient is a 58 yo M. History of persistent AF s/p ablation then off AAD with recurrence. We took him for a second ablation and put him back on Tikosyn. Has been doing well without recurrence since. Has ppm in place. Here for check and monitoring of Tikosyn therapy. No complaints other than some fatigue coming back to [place] recently.     ROS (+ highlighted in BOLD):  General--Fatigue, weight loss or weight gain  Cardiovascular--CP, orthopnea, PND    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/13/2019   • Pneumonia 2000   • Sleep apnea     CPAP       Past Surgical History:   Procedure Laterality Date   • OTHER CARDIAC SURGERY  12/20/16    pacemaker   • OTHER ORTHOPEDIC SURGERY  6/1/1990    achilles tendon repair left foot       Social History     Socioeconomic History   • Marital status:      Spouse name: Not on file   • Number of children: Not on file   • Years of education: Not on file   • Highest education level: Not on file   Occupational History   • Not on file   Social Needs   • Financial resource strain: Not on file   • Food insecurity:     Worry: Not on file     Inability: Not on file   • Transportation needs:     Medical: Not on file     Non-medical: Not on file   Tobacco Use   • Smoking status: Never Smoker   • Smokeless tobacco: Never Used   Substance and Sexual Activity   • Alcohol use: Yes     Alcohol/week: 0.0 oz     Comment: occasional   • Drug use: No   • Sexual activity: Not on file   Lifestyle   • Physical activity:     Days per week: Not on file     Minutes per session: Not on file   • Stress: Not on  "file   Relationships   • Social connections:     Talks on phone: Not on file     Gets together: Not on file     Attends [employer] service: Not on file     Active member of club or organization: Not on file     Attends meetings of clubs or organizations: Not on file     Relationship status: Not on file   • Intimate partner violence:     Fear of current or ex partner: Not on file     Emotionally abused: Not on file     Physically abused: Not on file     Forced sexual activity: Not on file   Other Topics Concern   • Not on file   Social History Narrative   • Not on file       Family History   Problem Relation Age of Onset   • Heart Disease Mother         atrial fib   • Stroke Father 65        CVa   • Diabetes Father    • Cancer Sister         skin   • No Known Problems Brother    • Stroke Paternal Grandmother        Allergies   Allergen Reactions   • Bloodless    • Codeine Vomiting   • Tramadol Vomiting and Nausea       Current Outpatient Medications   Medication Sig Dispense Refill   • metoprolol SR (TOPROL XL) 25 MG TABLET SR 24 HR TAKE 1 TABLET BY MOUTH TWICE A  Tab 3   • dofetilide (TIKOSYN) 500 MCG Cap Take 1 Cap by mouth every 12 hours. 180 Cap 3   • allopurinol (ZYLOPRIM) 300 MG Tab Take 1 Tab by mouth every day. 90 Tab 3   • rivaroxaban (XARELTO) 20 MG Tab tablet Take 1 Tab by mouth with dinner. 30 Tab 6   • Magnesium 500 MG Tab Take 1 Cap by mouth every day.       No current facility-administered medications for this visit.        Physical Exam:  Vitals:    12/17/19 1422   BP: 134/74   BP Location: Left arm   Patient Position: Sitting   BP Cuff Size: Adult   Pulse: 74   SpO2: 96%   Weight: (!) 129.7 kg (286 lb)   Height: 1.753 m (5' 9\")     General appearance: morbidly obese, NAD, conversant  HEENT: PERRL, neck is supple with FROM  Lungs: Clear to auscultation, normal respiratory effort  CV: RRR, no murmurs/rubs/gallops, no JVD  Abdomen: Soft, non-tender with normal bowel sounds  Extremities: No " peripheral edema, no clubbing or cyanosis  Skin: No rash, lesions, or ulcers  Psych: Alert and oriented to person, place and time    Data:  Labs reviewed    Prior echo/stress reviewed:    Device check reviewed    Impression/Plan:  1. Encounter for monitoring dofetilide therapy     2. Persistent atrial fibrillation     3. S/P ablation of atrial fibrillation     4. ARCHANA (obstructive sleep apnea)     5. Morbid obesity (HCC)     6. Chronic anticoagulation     7. Cardiac pacemaker in situ       -Pacemaker check reviewed, device working appropriately, 0% AF  -I will stop the Tikosyn today to see how he does  -Continue OAC for now  -I challenged him to lose 20 lbs by next visit    Alexi Faith MD

## 2020-03-04 DIAGNOSIS — M1A.0710 IDIOPATHIC CHRONIC GOUT OF RIGHT FOOT WITHOUT TOPHUS: ICD-10-CM

## 2020-03-04 RX ORDER — ALLOPURINOL 300 MG/1
TABLET ORAL
Qty: 90 TAB | Refills: 0 | Status: SHIPPED | OUTPATIENT
Start: 2020-03-04 | End: 2020-05-26 | Stop reason: SDUPTHER

## 2020-03-04 NOTE — LETTER
March 5, 2020        Juan Martel  [address] [place] [address]        Dear Juan:    We have received a request from your pharmacy to refill your prescription(s). After careful review of your chart, we have noted you are due for and anannual appointment.  We request you call our office at [phone] at your earliest convenience and make an appointment.    We look forward to scheduling an appointment for you, so that we may provide you with the safest and most complete medical care.      If you have any questions or concerns, please don't hesitate to call.        Sincerely,        SUE Fisher.    Electronically Signed

## 2020-03-05 NOTE — TELEPHONE ENCOUNTER
Refill done. Patient is due for annual appointment. Please have patient schedule.  SUE Fisher.

## 2020-03-27 ENCOUNTER — TELEPHONE (OUTPATIENT)
Dept: MEDICAL GROUP | Facility: MEDICAL CENTER | Age: 60
End: 2020-03-27

## 2020-03-27 DIAGNOSIS — Z13.228 SCREENING FOR METABOLIC DISORDER: ICD-10-CM

## 2020-03-27 DIAGNOSIS — Z13.21 ENCOUNTER FOR VITAMIN DEFICIENCY SCREENING: ICD-10-CM

## 2020-03-27 DIAGNOSIS — E78.00 PURE HYPERCHOLESTEROLEMIA: ICD-10-CM

## 2020-03-27 DIAGNOSIS — Z12.5 SCREENING PSA (PROSTATE SPECIFIC ANTIGEN): ICD-10-CM

## 2020-03-27 DIAGNOSIS — Z13.0 ENCOUNTER FOR SCREENING FOR HEMATOLOGIC DISORDER: ICD-10-CM

## 2020-03-27 NOTE — TELEPHONE ENCOUNTER
Annual labs ordered. He can get these done closer to June and follow up at that time or in 6 months.     SUE Fisher.

## 2020-03-30 ENCOUNTER — APPOINTMENT (OUTPATIENT)
Dept: MEDICAL GROUP | Facility: MEDICAL CENTER | Age: 60
End: 2020-03-30
Payer: MEDICARE

## 2020-04-10 DIAGNOSIS — I48.91 ATRIAL FIBRILLATION, UNSPECIFIED TYPE (HCC): ICD-10-CM

## 2020-05-26 ENCOUNTER — OFFICE VISIT (OUTPATIENT)
Dept: MEDICAL GROUP | Facility: MEDICAL CENTER | Age: 60
End: 2020-05-26
Payer: MEDICARE

## 2020-05-26 VITALS
OXYGEN SATURATION: 98 % | BODY MASS INDEX: 42.36 KG/M2 | HEART RATE: 77 BPM | HEIGHT: 69 IN | SYSTOLIC BLOOD PRESSURE: 124 MMHG | WEIGHT: 286 LBS | DIASTOLIC BLOOD PRESSURE: 82 MMHG | TEMPERATURE: 97.5 F

## 2020-05-26 DIAGNOSIS — Z11.59 NEED FOR HEPATITIS C SCREENING TEST: ICD-10-CM

## 2020-05-26 DIAGNOSIS — E66.01 MORBID OBESITY WITH BMI OF 40.0-44.9, ADULT (HCC): ICD-10-CM

## 2020-05-26 DIAGNOSIS — M1A.0710 IDIOPATHIC CHRONIC GOUT OF RIGHT FOOT WITHOUT TOPHUS: ICD-10-CM

## 2020-05-26 DIAGNOSIS — G47.33 OSA (OBSTRUCTIVE SLEEP APNEA): ICD-10-CM

## 2020-05-26 DIAGNOSIS — Z12.5 PROSTATE CANCER SCREENING: ICD-10-CM

## 2020-05-26 DIAGNOSIS — E78.5 DYSLIPIDEMIA: ICD-10-CM

## 2020-05-26 DIAGNOSIS — Z12.11 COLON CANCER SCREENING: ICD-10-CM

## 2020-05-26 DIAGNOSIS — Z00.00 MEDICARE ANNUAL WELLNESS VISIT, INITIAL: ICD-10-CM

## 2020-05-26 DIAGNOSIS — I48.19 PERSISTENT ATRIAL FIBRILLATION (HCC): ICD-10-CM

## 2020-05-26 PROCEDURE — G0438 PPPS, INITIAL VISIT: HCPCS | Performed by: FAMILY MEDICINE

## 2020-05-26 RX ORDER — ALLOPURINOL 300 MG/1
300 TABLET ORAL
Qty: 90 TAB | Refills: 3 | Status: SHIPPED | OUTPATIENT
Start: 2020-05-26 | End: 2021-06-14 | Stop reason: SDUPTHER

## 2020-05-26 ASSESSMENT — ACTIVITIES OF DAILY LIVING (ADL): BATHING_REQUIRES_ASSISTANCE: 0

## 2020-05-26 ASSESSMENT — ENCOUNTER SYMPTOMS: GENERAL WELL-BEING: GOOD

## 2020-05-26 ASSESSMENT — FIBROSIS 4 INDEX: FIB4 SCORE: 1.28

## 2020-05-26 ASSESSMENT — PATIENT HEALTH QUESTIONNAIRE - PHQ9: CLINICAL INTERPRETATION OF PHQ2 SCORE: 0

## 2020-05-26 NOTE — PROGRESS NOTES
HPI:  Juan Martel is a 60 y.o. here for Medicare Annual Wellness Visit     Patient Active Problem List    Diagnosis Date Noted   • Morbid obesity with BMI of 40.0-44.9, adult (HCC) 05/26/2020   • Family history of diabetes mellitus (DM) 03/13/2019   • 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   • S/P ablation of atrial fibrillation 02/02/2017   • Cardiac pacemaker in situ 12/20/2016   • High risk medication use 11/29/2016   • ARCHANA (obstructive sleep apnea) 06/15/2016   • Atrial fibrillation (HCC) 04/18/2016   • Chronic anticoagulation 04/18/2016   • Nausea and vomiting 04/17/2016   • Near syncope 04/12/2016       Current Outpatient Medications   Medication Sig Dispense Refill   • allopurinol (ZYLOPRIM) 300 MG Tab Take 1 Tab by mouth every day. 90 Tab 3   • rivaroxaban (XARELTO) 20 MG Tab tablet Take 1 Tab by mouth with dinner. 90 Tab 1   • metoprolol SR (TOPROL XL) 25 MG TABLET SR 24 HR TAKE 1 TABLET BY MOUTH TWICE A  Tab 3   • Magnesium 500 MG Tab Take 1 Cap by mouth every day.       No current facility-administered medications for this visit.             Current supplements as per medication list.       Allergies: Bloodless; Codeine; and Tramadol    Current social contact/activities: Living with wife. 3 children, 2 in [place], 1 in [place]. Retired.     He  reports that he has never smoked. He has never used smokeless tobacco. He reports current alcohol use. He reports that he does not use drugs.  Counseling given: Not Answered      DPA/Advanced Directive:  Patient has Advanced Directive on file.     ROS:    Gait: Uses no assistive device  Ostomy: No  Other tubes: No  Amputations: No  Chronic oxygen use: No  Last eye exam: 3 months ago.  Wears hearing aids: No   : Denies any urinary leakage during the last 6 months      Depression Screening    Little interest or pleasure in doing things?  0 - not at all  Feeling down, depressed , or hopeless? 0 - not at all  Patient Health  Questionnaire Score: 0     If depressive symptoms identified deferred to follow up visit unless specifically addressed in assessment and plan.    Interpretation of PHQ-9 Total Score   Score Severity   1-4 No Depression   5-9 Mild Depression   10-14 Moderate Depression   15-19 Moderately Severe Depression   20-27 Severe Depression    Screening for Cognitive Impairment    Three Minute Recall (village, kitchen, baby) 2/3    Joel clock face with all 12 numbers and set the hands to show 10 past 10.  No    Cognitive concerns identified deferred for follow up unless specifically addressed in assessment and plan.    Fall Risk Assessment    Has the patient had two or more falls in the last year or any fall with injury in the last year?  No    Safety Assessment    Throw rugs on floor.  Yes  Handrails on all stairs.  Yes  Good lighting in all hallways.  Yes  Difficulty hearing.  No  Patient counseled about all safety risks that were identified.    Functional Assessment ADLs    Are there any barriers preventing you from cooking for yourself or meeting nutritional needs?  No.    Are there any barriers preventing you from driving safely or obtaining transportation?  No.    Are there any barriers preventing you from using a telephone or calling for help?  No.    Are there any barriers preventing you from shopping?  No.    Are there any barriers preventing you from taking care of your own finances?  No.    Are there any barriers preventing you from managing your medications?  No.    Are there any barriers preventing you from showering, bathing or dressing yourself?  No.    Are you currently engaging in any exercise or physical activity?  Yes.     What is your perception of your health?  Good.      Health Maintenance Summary                HEPATITIS C SCREENING Overdue 1958     IMM DTaP/Tdap/Td Vaccine Overdue 3/14/1977     COLOGUARD STOOL DNA Overdue 3/14/2008     IMM ZOSTER VACCINES Overdue 3/14/2008     IMM INFLUENZA Next  "Due 9/1/2020           Patient Care Team:  ALEX Fisher as PCP - General (Family Medicine)  [facility] as Res[facility]        Social History     Tobacco Use   • Smoking status: Never Smoker   • Smokeless tobacco: Never Used   Substance Use Topics   • Alcohol use: Yes     Alcohol/week: 0.0 oz     Comment: occasional   • Drug use: No     Family History   Problem Relation Age of Onset   • Heart Disease Mother         atrial fib   • Stroke Father 65        CVa   • Diabetes Father    • Cancer Sister         skin   • No Known Problems Brother    • Stroke Paternal Grandmother      He  has a past medical history of A-fib (HCC), At risk for sleep apnea, Atrial fibrillation (HCC), Cardiac asystole (HCC) (12/20/16), Cardiac pacemaker in situ (12/20/2016), Gout, Idiopathic chronic gout of right foot without tophus (3/13/2019), Pneumonia (2000), and Sleep apnea. He also has no past medical history of Asthma, Disorder of thyroid, or Heart valve disease.   Past Surgical History:   Procedure Laterality Date   • OTHER CARDIAC SURGERY  12/20/16    pacemaker   • OTHER ORTHOPEDIC SURGERY  6/1/1990    achilles tendon repair left foot       Exam:   /82 (BP Location: Right arm, Patient Position: Sitting)   Pulse 77   Temp 36.4 °C (97.5 °F) (Temporal)   Ht 1.753 m (5' 9\")   Wt (!) 129.7 kg (286 lb)   SpO2 98%  Body mass index is 42.23 kg/m².    Constitutional: Alert, no distress.  Skin: Warm, dry, good turgor, no rashes in visible areas.  Eye: Equal, round and reactive, conjunctiva clear, lids normal.  Respiratory: Unlabored respiratory effort, lungs clear to auscultation, no wheezes, no ronchi.  Cardiovascular: Normal S1, S2, no murmur, no edema.  Psych: Alert and oriented x3, normal affect and mood.      Assessment and Plan. The following treatment and monitoring plan is recommended:    1. Medicare annual wellness visit, initial  Reviewed preventative health topics.    2. Persistent atrial fibrillation " (HCC)  Clinically feeling very well after last ablation.  Check labs and call with results.  Continue current medications per cardiology recommendation.  - CBC WITH DIFFERENTIAL; Future  - Comp Metabolic Panel; Future  - TSH WITH REFLEX TO FT4; Future    3. Morbid obesity with BMI of 40.0-44.9, adult (HCC)  - Patient identified as having weight management issue.  Appropriate orders and counseling given.    4. ARCHANA (obstructive sleep apnea)  Stable.  Continue CPAP.    5. Dyslipidemia  Check labs and call with results.  - Comp Metabolic Panel; Future  - Lipid Profile; Future    6. Idiopathic chronic gout of right foot without tophus  Controlled with no attacks while on allopurinol.  Continue allopurinol.  - allopurinol (ZYLOPRIM) 300 MG Tab; Take 1 Tab by mouth every day.  Dispense: 90 Tab; Refill: 3  - URIC ACID; Future    7. Need for hepatitis C screening test  Check labs and call with result.  - HCV Scrn ( [id number] 1xLife); Future    8. Prostate cancer screening  Check labs and call with result.  - PROSTATE SPECIFIC AG SCREENING; Future    9. Colon cancer screening  Patient prefers Cologuard over colonoscopy.  Cologuard ordered.  - COLOGUARD (FIT DNA)        Services suggested: No services needed at this time  Health Care Screening: Age-appropriate preventive services recommended by USPTF and ACIP covered by Medicare were discussed today. Services ordered if indicated and agreed upon by the patient.  Referrals offered: Community-based lifestyle interventions to reduce health risks and promote self-management and wellness, fall prevention, nutrition, physical activity, tobacco-use cessation, weight loss, and mental health services as per orders if indicated.    Discussion today about general wellness and lifestyle habits:    · Prevent falls and reduce trip hazards; Cautioned about securing or removing rugs.  · Have a working fire alarm and carbon monoxide detector;   · Engage in regular physical activity and  social activities     Follow-up: Return in about 1 year (around 5/26/2021) for Annual.

## 2020-06-18 DIAGNOSIS — I48.91 ATRIAL FIBRILLATION, UNSPECIFIED TYPE (HCC): ICD-10-CM

## 2020-06-19 RX ORDER — METOPROLOL SUCCINATE 25 MG/1
TABLET, EXTENDED RELEASE ORAL
Qty: 180 TAB | Refills: 0 | Status: SHIPPED | OUTPATIENT
Start: 2020-06-19 | End: 2020-09-21

## 2020-07-28 ENCOUNTER — OFFICE VISIT (OUTPATIENT)
Dept: CARDIOLOGY | Facility: MEDICAL CENTER | Age: 60
End: 2020-07-28
Payer: MEDICARE

## 2020-07-28 VITALS
BODY MASS INDEX: 40.73 KG/M2 | OXYGEN SATURATION: 94 % | WEIGHT: 275 LBS | HEART RATE: 70 BPM | DIASTOLIC BLOOD PRESSURE: 82 MMHG | HEIGHT: 69 IN | RESPIRATION RATE: 14 BRPM | SYSTOLIC BLOOD PRESSURE: 128 MMHG

## 2020-07-28 DIAGNOSIS — Z95.0 CARDIAC PACEMAKER IN SITU: ICD-10-CM

## 2020-07-28 DIAGNOSIS — Z98.890 S/P ABLATION OF ATRIAL FIBRILLATION: ICD-10-CM

## 2020-07-28 DIAGNOSIS — Z86.79 S/P ABLATION OF ATRIAL FIBRILLATION: ICD-10-CM

## 2020-07-28 DIAGNOSIS — Z79.01 CHRONIC ANTICOAGULATION: ICD-10-CM

## 2020-07-28 PROCEDURE — 99214 OFFICE O/P EST MOD 30 MIN: CPT | Mod: 25 | Performed by: INTERNAL MEDICINE

## 2020-07-28 PROCEDURE — 93280 PM DEVICE PROGR EVAL DUAL: CPT | Performed by: INTERNAL MEDICINE

## 2020-07-28 ASSESSMENT — FIBROSIS 4 INDEX: FIB4 SCORE: 1.28

## 2020-07-28 NOTE — PROGRESS NOTES
Arrhythmia Clinic Note (Established patient)    DOS: 7/28/2020    Chief complaint/Reason for consult: F/u AF    Interval History:  Patient is a 61 yo M. History of PPM, ARCHANA on CPAP, morbid obesity. Has history of persistent AF. S/p prior AF ablation x 2. Off Tikosyn since our last visit about 8 months ago. Has not had any more AF recurrences. He had lost some weight but then gyms closed and then gained some of it back. Back at the gym at the [employer].    ROS (+ highlighted in red):  General--Negative for fatigue, weight loss or weight gain  Cardiovascular--Negative for CP, orthopnea, PND    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/13/2019   • Pneumonia 2000   • Sleep apnea     CPAP       Past Surgical History:   Procedure Laterality Date   • OTHER CARDIAC SURGERY  12/20/16    pacemaker   • OTHER ORTHOPEDIC SURGERY  6/1/1990    achilles tendon repair left foot       Social History     Socioeconomic History   • Marital status:      Spouse name: Not on file   • Number of children: Not on file   • Years of education: Not on file   • Highest education level: Not on file   Occupational History   • Not on file   Social Needs   • Financial resource strain: Not on file   • Food insecurity     Worry: Not on file     Inability: Not on file   • Transportation needs     Medical: Not on file     Non-medical: Not on file   Tobacco Use   • Smoking status: Never Smoker   • Smokeless tobacco: Never Used   Substance and Sexual Activity   • Alcohol use: Yes     Alcohol/week: 0.0 oz     Comment: occasional   • Drug use: No   • Sexual activity: Not on file   Lifestyle   • Physical activity     Days per week: Not on file     Minutes per session: Not on file   • Stress: Not on file  "  Relationships   • Social connections     Talks on phone: Not on file     Gets together: Not on file     Attends Roman [employer] service: Not on file     Active member of club or organization: Not on file     Attends meetings of clubs or organizations: Not on file     Relationship status: Not on file   • Intimate partner violence     Fear of current or ex partner: Not on file     Emotionally abused: Not on file     Physically abused: Not on file     Forced sexual activity: Not on file   Other Topics Concern   • Not on file   Social History Narrative   • Not on file       Family History   Problem Relation Age of Onset   • Heart Disease Mother         atrial fib   • Stroke Father 65        CVa   • Diabetes Father    • Cancer Sister         skin   • No Known Problems Brother    • Stroke Paternal Grandmother        Allergies   Allergen Reactions   • Bloodless    • Codeine Vomiting   • Tramadol Vomiting and Nausea       Current Outpatient Medications   Medication Sig Dispense Refill   • metoprolol SR (TOPROL XL) 25 MG TABLET SR 24 HR TAKE 1 TABLET BY MOUTH TWICE A  Tab 0   • allopurinol (ZYLOPRIM) 300 MG Tab Take 1 Tab by mouth every day. 90 Tab 3   • rivaroxaban (XARELTO) 20 MG Tab tablet Take 1 Tab by mouth with dinner. 90 Tab 1   • Magnesium 500 MG Tab Take 1 Cap by mouth every day.       No current facility-administered medications for this visit.        Physical Exam:  Vitals:    07/28/20 1420   BP: 128/82   BP Location: Right arm   Patient Position: Sitting   BP Cuff Size: Adult   Pulse: 70   Resp: 14   SpO2: 94%   Weight: 124.7 kg (275 lb)   Height: 1.753 m (5' 9\")     General appearance: NAD, conversant  HEENT: PERRL, neck is supple with FROM  Lungs: Clear to auscultation, normal respiratory effort  CV: RRR, no murmurs/rubs/gallops, no JVD  Abdomen: Soft, non-tender with normal bowel sounds  Extremities: No peripheral edema, no clubbing or cyanosis  Skin: No rash, lesions, or ulcers  Psych: Alert and oriented " to person, place and time    Data:  Labs reviewed    Prior echo/stress reviewed:  Preserved LV function    Device interrogation reviewed    Impression/Plan:  1. S/P ablation of atrial fibrillation     2. Chronic anticoagulation     3. Cardiac pacemaker in situ       -No AF on device, stopped Tikosyn 8 months ago  -Doing well  -Will stop OAC for now as overall low CHADSVasc and no AF  -Otherwise PPM working appropriately  -F/u in 6 mo    Alexi Faith MD

## 2020-09-17 DIAGNOSIS — I48.91 ATRIAL FIBRILLATION, UNSPECIFIED TYPE (HCC): ICD-10-CM

## 2020-09-21 RX ORDER — METOPROLOL SUCCINATE 25 MG/1
TABLET, EXTENDED RELEASE ORAL
Qty: 180 TAB | Refills: 3 | Status: SHIPPED | OUTPATIENT
Start: 2020-09-21 | End: 2021-09-13

## 2021-02-16 ENCOUNTER — OFFICE VISIT (OUTPATIENT)
Dept: CARDIOLOGY | Facility: MEDICAL CENTER | Age: 61
End: 2021-02-16
Payer: MEDICARE

## 2021-02-16 VITALS
HEIGHT: 69 IN | SYSTOLIC BLOOD PRESSURE: 114 MMHG | WEIGHT: 282 LBS | OXYGEN SATURATION: 94 % | BODY MASS INDEX: 41.77 KG/M2 | DIASTOLIC BLOOD PRESSURE: 76 MMHG | HEART RATE: 73 BPM

## 2021-02-16 DIAGNOSIS — Z98.890 S/P ABLATION OF ATRIAL FIBRILLATION: ICD-10-CM

## 2021-02-16 DIAGNOSIS — Z86.79 S/P ABLATION OF ATRIAL FIBRILLATION: ICD-10-CM

## 2021-02-16 DIAGNOSIS — Z95.0 CARDIAC PACEMAKER IN SITU: ICD-10-CM

## 2021-02-16 PROCEDURE — 93280 PM DEVICE PROGR EVAL DUAL: CPT | Performed by: INTERNAL MEDICINE

## 2021-02-16 PROCEDURE — 99214 OFFICE O/P EST MOD 30 MIN: CPT | Mod: 25 | Performed by: INTERNAL MEDICINE

## 2021-02-16 NOTE — PROGRESS NOTES
Arrhythmia Clinic Note (Established patient)    DOS: 2/16/2021    Chief complaint/Reason for consult: SSS s/p PPM    Interval History:  Patient is a 61 yo M. History of AF and SSS s/p PPM. S/p AF ablation and done well since. Tikosyn was stopped. Then with no further detectable AF OAC was stopped as well. Here for device check and follow-up. [place] been [place]    ROS (+ highlighted in red):  General--Negative for fatigue, weight loss or weight gain  Cardiovascular--Negative for CP, orthopnea, PND    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/13/2019   • Pneumonia 2000   • Sleep apnea     CPAP       Past Surgical History:   Procedure Laterality Date   • OTHER CARDIAC SURGERY  12/20/16    pacemaker   • OTHER ORTHOPEDIC SURGERY  6/1/1990    achilles tendon repair left foot       Social History     Socioeconomic History   • Marital status:      Spouse name: Not on file   • Number of children: Not on file   • Years of education: Not on file   • Highest education level: Not on file   Occupational History   • Not on file   Tobacco Use   • Smoking status: Never Smoker   • Smokeless tobacco: Never Used   Substance and Sexual Activity   • Alcohol use: Yes     Alcohol/week: 0.0 oz     Comment: occasional   • Drug use: No   • Sexual activity: Not on file   Other Topics Concern   • Not on file   Social History Narrative   • Not on file     Social Determinants of Health     Financial Resource Strain:    • Difficulty of Paying Living Expenses:    Food Insecurity:    • Worried About Running Out of Food in the Last Year:    • Ran Out of Food in the Last Year:    Transportation Needs:    • Lack of Transportation (Medical):    • Lack of  "Transportation (Non-Medical):    Physical Activity:    • Days of Exercise per Week:    • Minutes of Exercise per Session:    Stress:    • Feeling of Stress :    Social Connections:    • Frequency of Communication with Friends and Family:    • Frequency of Social Gatherings with Friends and Family:    • Attends [employer] Services:    • Active Member of Clubs or Organizations:    • Attends Club or Organization Meetings:    • Marital Status:    Intimate Partner Violence:    • Fear of Current or Ex-Partner:    • Emotionally Abused:    • Physically Abused:    • Sexually Abused:        Family History   Problem Relation Age of Onset   • Heart Disease Mother         atrial fib   • Stroke Father 65        CVa   • Diabetes Father    • Cancer Sister         skin   • No Known Problems Brother    • Stroke Paternal Grandmother        Allergies   Allergen Reactions   • Bloodless    • Codeine Vomiting   • Tramadol Vomiting and Nausea       Current Outpatient Medications   Medication Sig Dispense Refill   • metoprolol SR (TOPROL XL) 25 MG TABLET SR 24 HR TAKE 1 TABLET BY MOUTH TWICE A  Tab 3   • allopurinol (ZYLOPRIM) 300 MG Tab Take 1 Tab by mouth every day. 90 Tab 3   • Magnesium 500 MG Tab Take 1 Cap by mouth every day.     • rivaroxaban (XARELTO) 20 MG Tab tablet Take 1 Tab by mouth with dinner. (Patient not taking: Reported on 2/16/2021) 90 Tab 1     No current facility-administered medications for this visit.       Physical Exam:  Vitals:    02/16/21 1428   BP: 114/76   BP Location: Left arm   Patient Position: Sitting   BP Cuff Size: Large adult   Pulse: 73   SpO2: 94%   Weight: (!) 128 kg (282 lb)   Height: 1.753 m (5' 9\")     General appearance: NAD, conversant  HEENT: PERRL, neck is supple with FROM  Lungs: Clear to auscultation, normal respiratory effort  CV: RRR, no murmurs/rubs/gallops, no JVD  Abdomen: Soft, non-tender with normal bowel sounds  Extremities: No peripheral edema, no clubbing or cyanosis  Skin: No " rash, lesions, or ulcers  Psych: Alert and oriented to person, place and time    Data:  Labs reviewed    Prior echo/stress reviewed:  Normal EF    Device check reviewed    Impression/Plan:  1. Cardiac pacemaker in situ     2. S/P ablation of atrial fibrillation       -Device check reviewed, working appropriately  -Off Tikosyn and OAC  -0% AF burden on device  -Can stay off OAC, continue small dose of Toprol  -F/u device in 12 mo    Alexi Faith MD

## 2021-05-28 ENCOUNTER — OFFICE VISIT (OUTPATIENT)
Dept: URGENT CARE | Facility: CLINIC | Age: 61
End: 2021-05-28
Payer: MEDICARE

## 2021-05-28 VITALS
HEART RATE: 63 BPM | DIASTOLIC BLOOD PRESSURE: 70 MMHG | TEMPERATURE: 97.3 F | SYSTOLIC BLOOD PRESSURE: 120 MMHG | RESPIRATION RATE: 16 BRPM | WEIGHT: 289 LBS | HEIGHT: 69 IN | OXYGEN SATURATION: 96 % | BODY MASS INDEX: 42.8 KG/M2

## 2021-05-28 DIAGNOSIS — H92.01 OTALGIA OF RIGHT EAR: ICD-10-CM

## 2021-05-28 DIAGNOSIS — H65.01 NON-RECURRENT ACUTE SEROUS OTITIS MEDIA OF RIGHT EAR: ICD-10-CM

## 2021-05-28 DIAGNOSIS — H61.21 IMPACTED CERUMEN OF RIGHT EAR: ICD-10-CM

## 2021-05-28 PROCEDURE — 99213 OFFICE O/P EST LOW 20 MIN: CPT | Performed by: PHYSICIAN ASSISTANT

## 2021-05-28 RX ORDER — AMOXICILLIN AND CLAVULANATE POTASSIUM 875; 125 MG/1; MG/1
1 TABLET, FILM COATED ORAL 2 TIMES DAILY
Qty: 14 TABLET | Refills: 0 | Status: SHIPPED | OUTPATIENT
Start: 2021-05-28 | End: 2021-06-04

## 2021-05-28 ASSESSMENT — ENCOUNTER SYMPTOMS
EYE DISCHARGE: 0
DIZZINESS: 1
HEADACHES: 1
SORE THROAT: 0
NAUSEA: 0
EYE REDNESS: 0
COUGH: 0
DIARRHEA: 0
MYALGIAS: 0
SHORTNESS OF BREATH: 0
FEVER: 0
VOMITING: 0

## 2021-05-28 NOTE — PROGRESS NOTES
Subjective:      Juan Martel is a 61 y.o. male who presents with Ear Pain ((R), can't hear, vertigo, x5 days )            Otalgia   There is pain in the right ear. This is a new problem. Episode onset: x 5 days ago. The problem occurs constantly. The problem has been unchanged. There has been no fever. The pain is mild. Associated symptoms include headaches and hearing loss (The patient reports decreased/muffled hearing of the left ear.). Pertinent negatives include no coughing, diarrhea, ear discharge, rash, sore throat or vomiting. He has tried nothing for the symptoms.     PMH:  has a past medical history of A-fib ([facility]), At risk for sleep apnea, Atrial fibrillation ([facility]), Cardiac asystole ([facility]) (12/20/16), Cardiac pacemaker in situ (12/20/2016), Gout, Idiopathic chronic gout of right foot without tophus (3/13/2019), Pneumonia (2000), and Sleep apnea. He also has no past medical history of Asthma, Disorder of thyroid, or Heart valve disease.  MEDS:   Current Outpatient Medications:   •  metoprolol SR (TOPROL XL) 25 MG TABLET SR 24 HR, TAKE 1 TABLET BY MOUTH TWICE A DAY, Disp: 180 Tab, Rfl: 3  •  allopurinol (ZYLOPRIM) 300 MG Tab, Take 1 Tab by mouth every day., Disp: 90 Tab, Rfl: 3  •  Magnesium 500 MG Tab, Take 1 Cap by mouth every day., Disp: , Rfl:   •  rivaroxaban (XARELTO) 20 MG Tab tablet, Take 1 Tab by mouth with dinner. (Patient not taking: Reported on 2/16/2021), Disp: 90 Tab, Rfl: 1  ALLERGIES:   Allergies   Allergen Reactions   • Bloodless    • Codeine Vomiting   • Tramadol Vomiting and Nausea     SURGHX:   Past Surgical History:   Procedure Laterality Date   • OTHER CARDIAC SURGERY  12/20/16    pacemaker   • OTHER ORTHOPEDIC SURGERY  6/1/1990    achilles tendon repair left foot     SOCHX:  reports that he has never smoked. He has never used smokeless tobacco. He reports current alcohol use. He reports that he does not use drugs.  FH: Family history was reviewed, no pertinent findings to  "report      Review of Systems   Constitutional: Negative for fever.   HENT: Positive for ear pain and hearing loss (The patient reports decreased/muffled hearing of the left ear.). Negative for congestion, ear discharge and sore throat.    Eyes: Negative for discharge and redness.   Respiratory: Negative for cough and shortness of breath.    Gastrointestinal: Negative for diarrhea, nausea and vomiting.   Musculoskeletal: Negative for myalgias.   Skin: Negative for rash.   Neurological: Positive for dizziness (The patient reports intermittent vertigo. The patient states he experiences room spinning dizziness with head movements.  The patient reports a history of vertigo.) and headaches.          Objective:     /70 (BP Location: Left arm, Patient Position: Sitting, BP Cuff Size: Adult long)   Pulse 63   Temp 36.3 °C (97.3 °F) (Temporal)   Resp 16   Ht 1.753 m (5' 9\")   Wt (!) 131 kg (289 lb)   SpO2 96%   BMI 42.68 kg/m²      Physical Exam  Constitutional:       General: He is not in acute distress.     Appearance: Normal appearance. He is not ill-appearing.   HENT:      Head: Normocephalic and atraumatic.      Right Ear: External ear normal. There is impacted cerumen.      Left Ear: Tympanic membrane, ear canal and external ear normal.      Nose: Nose normal.      Mouth/Throat:      Mouth: Mucous membranes are moist.      Pharynx: Oropharynx is clear. No posterior oropharyngeal erythema.   Eyes:      Extraocular Movements: Extraocular movements intact.      Conjunctiva/sclera: Conjunctivae normal.   Cardiovascular:      Rate and Rhythm: Normal rate and regular rhythm.      Heart sounds: Normal heart sounds.   Pulmonary:      Effort: Pulmonary effort is normal. No respiratory distress.      Breath sounds: Normal breath sounds. No wheezing.   Musculoskeletal:         General: Normal range of motion.      Cervical back: Normal range of motion and neck supple.   Skin:     General: Skin is warm and dry. "   Neurological:      General: No focal deficit present.      Mental Status: He is alert and oriented to person, place, and time.              Progress:   Ear Lavage:   Successful removal of cerumen from the right ear canal via lavage.  On re-examination, the patient's right TM was slightly erythematous.   The patient reports improvement of his symptoms, stating he can hear clearly.         Assessment/Plan:           1. Otalgia of right ear    2. Impacted cerumen of right ear    3. Non-recurrent acute serous otitis media of right ear  - amoxicillin-clavulanate (AUGMENTIN) 875-125 MG Tab; Take 1 tablet by mouth 2 times a day for 7 days.  Dispense: 14 tablet; Refill: 0  -- Contingent antibiotic prescription given to patient to fill upon meeting criteria of guidelines discussed.     It is unclear if the erythema to the patient's right ear canal and right TM are related to the cerumen removal that was performed today in clinic or if the patient is experiencing an acute otitis media.  Will prescribe the patient a contingent antibiotic for a possible otitis media.  Instructed the patient to take the antibiotic if he develops continued or recurrent ear pain.  The patient verbalized understanding.  Recommend OTC medications and supportive care for symptomatic management.  Recommend follow-up with PCP as needed.  Discussed return precautions with the patient, and he verbalized understanding.    Differential diagnoses, supportive care, and indications for immediate follow-up discussed with patient.   Instructed to return to clinic or nearest emergency department for any change in condition, further concerns, or worsening of symptoms.    OTC Tylenol or Motrin for fever/discomfort.  Drink plenty of fluids  Follow-up with PCP  Return to clinic or go to the ED if symptoms worsen or fail to improve, or if the patient should develop worsening/increasing ear pain, drainage from the affected ear, cough, congestion, sore throat,  fever/chills, and/or any concerning symptoms.    Discussed plan with the patient, and he agrees to the above.    I personally reviewed prior external notes and test results pertinent to today's visit.  I have independently reviewed and interpreted all diagnostics ordered during this urgent care visit.     Time spent evaluating this patient was at least 30 minutes and includes preparing for visit, obtaining history, exam and evaluation, ordering labs/tests/procedures/medications, independent interpretation, and counseling/education.    Please note that this dictation was created using voice recognition software. I have made every reasonable attempt to correct obvious errors, but I expect that there may be errors of grammar and possibly content that I did not discover before finalizing the note.     This note was electronically signed by Jacqueline Raza PA-C

## 2021-06-14 DIAGNOSIS — M1A.0710 IDIOPATHIC CHRONIC GOUT OF RIGHT FOOT WITHOUT TOPHUS: ICD-10-CM

## 2021-06-14 RX ORDER — ALLOPURINOL 300 MG/1
300 TABLET ORAL
Qty: 90 TABLET | Refills: 0 | Status: SHIPPED | OUTPATIENT
Start: 2021-06-14 | End: 2021-09-08

## 2021-06-14 NOTE — LETTER
June 14, 2021        Juan Martel  [address] [place] [address]        Dear Juan:    We have received a request from your pharmacy to refill your prescription(s). After careful review of your chart, we have noted you are due for an annual appointment.  We request you call our office at [phone] at your earliest convenience and make an appointment.     We look forward to scheduling an appointment for you, so that we may provide you with the safest and most complete medical care.        If you have any questions or concerns, please don't hesitate to call.        Sincerely,        SUE Fisher.    Electronically Signed

## 2021-06-23 ENCOUNTER — OFFICE VISIT (OUTPATIENT)
Dept: MEDICAL GROUP | Facility: MEDICAL CENTER | Age: 61
End: 2021-06-23
Payer: MEDICARE

## 2021-06-23 VITALS
BODY MASS INDEX: 42.95 KG/M2 | DIASTOLIC BLOOD PRESSURE: 82 MMHG | TEMPERATURE: 97.5 F | HEART RATE: 65 BPM | SYSTOLIC BLOOD PRESSURE: 120 MMHG | WEIGHT: 290 LBS | HEIGHT: 69 IN | OXYGEN SATURATION: 94 %

## 2021-06-23 DIAGNOSIS — Z86.79 S/P ABLATION OF ATRIAL FIBRILLATION: ICD-10-CM

## 2021-06-23 DIAGNOSIS — Z86.79 HISTORY OF ATRIAL FIBRILLATION: ICD-10-CM

## 2021-06-23 DIAGNOSIS — Z98.890 S/P ABLATION OF ATRIAL FIBRILLATION: ICD-10-CM

## 2021-06-23 DIAGNOSIS — M1A.0710 IDIOPATHIC CHRONIC GOUT OF RIGHT FOOT WITHOUT TOPHUS: ICD-10-CM

## 2021-06-23 DIAGNOSIS — Z95.0 CARDIAC PACEMAKER IN SITU: ICD-10-CM

## 2021-06-23 DIAGNOSIS — Z12.12 SCREENING FOR COLORECTAL CANCER: ICD-10-CM

## 2021-06-23 DIAGNOSIS — Z13.21 ENCOUNTER FOR VITAMIN DEFICIENCY SCREENING: ICD-10-CM

## 2021-06-23 DIAGNOSIS — Z91.89 ENCOUNTER FOR HEPATITIS C VIRUS SCREENING TEST FOR HIGH RISK PATIENT: ICD-10-CM

## 2021-06-23 DIAGNOSIS — Z13.228 SCREENING FOR METABOLIC DISORDER: ICD-10-CM

## 2021-06-23 DIAGNOSIS — R73.09 ELEVATED GLUCOSE LEVEL: ICD-10-CM

## 2021-06-23 DIAGNOSIS — G47.33 OSA (OBSTRUCTIVE SLEEP APNEA): ICD-10-CM

## 2021-06-23 DIAGNOSIS — Z13.0 ENCOUNTER FOR SCREENING FOR HEMATOLOGIC DISORDER: ICD-10-CM

## 2021-06-23 DIAGNOSIS — Z12.5 SCREENING FOR PROSTATE CANCER: ICD-10-CM

## 2021-06-23 DIAGNOSIS — I70.90 ATHEROSCLEROSIS: ICD-10-CM

## 2021-06-23 DIAGNOSIS — Z13.220 SCREENING, LIPID: ICD-10-CM

## 2021-06-23 DIAGNOSIS — Z83.3 FAMILY HISTORY OF DIABETES MELLITUS: ICD-10-CM

## 2021-06-23 DIAGNOSIS — Z91.89 OTHER SPECIFIED PERSONAL RISK FACTORS, NOT ELSEWHERE CLASSIFIED: ICD-10-CM

## 2021-06-23 DIAGNOSIS — Z11.59 ENCOUNTER FOR HEPATITIS C VIRUS SCREENING TEST FOR HIGH RISK PATIENT: ICD-10-CM

## 2021-06-23 DIAGNOSIS — E66.01 MORBID OBESITY WITH BMI OF 40.0-44.9, ADULT (HCC): ICD-10-CM

## 2021-06-23 DIAGNOSIS — Z12.11 SCREENING FOR COLORECTAL CANCER: ICD-10-CM

## 2021-06-23 DIAGNOSIS — Z00.00 MEDICARE ANNUAL WELLNESS VISIT, SUBSEQUENT: ICD-10-CM

## 2021-06-23 PROCEDURE — G0439 PPPS, SUBSEQ VISIT: HCPCS | Performed by: NURSE PRACTITIONER

## 2021-06-23 ASSESSMENT — ENCOUNTER SYMPTOMS: GENERAL WELL-BEING: GOOD

## 2021-06-23 ASSESSMENT — PATIENT HEALTH QUESTIONNAIRE - PHQ9: CLINICAL INTERPRETATION OF PHQ2 SCORE: 0

## 2021-06-23 ASSESSMENT — ACTIVITIES OF DAILY LIVING (ADL): BATHING_REQUIRES_ASSISTANCE: 0

## 2021-06-23 NOTE — PROGRESS NOTES
Chief Complaint   Patient presents with   • Annual Exam         HPI:  Juan is a 61 y.o. here for Medicare Annual Wellness Visit      Patient Active Problem List    Diagnosis Date Noted   • Morbid obesity with BMI of 40.0-44.9, adult (HCC) 05/26/2020   • Family history of diabetes mellitus (DM) 03/13/2019   • 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   • S/P ablation of atrial fibrillation 02/02/2017   • Cardiac pacemaker in situ 12/20/2016   • High risk medication use 11/29/2016   • ARCHANA (obstructive sleep apnea) 06/15/2016   • Atrial fibrillation (HCC) 04/18/2016   • Chronic anticoagulation 04/18/2016   • Nausea and vomiting 04/17/2016   • Near syncope 04/12/2016       Current Outpatient Medications   Medication Sig Dispense Refill   • allopurinol (ZYLOPRIM) 300 MG Tab Take 1 tablet by mouth every day. 90 tablet 0   • metoprolol SR (TOPROL XL) 25 MG TABLET SR 24 HR TAKE 1 TABLET BY MOUTH TWICE A  Tab 3   • Magnesium 500 MG Tab Take 1 Cap by mouth every day.     • rivaroxaban (XARELTO) 20 MG Tab tablet Take 1 Tab by mouth with dinner. (Patient not taking: Reported on 2/16/2021) 90 Tab 1     No current facility-administered medications for this visit.        Patient is taking medications as noted in medication list.  Current supplements as per medication list.     Allergies: Bloodless, Codeine, and Tramadol    Current social contact/activities: pt enjoys fishing with family, pt likes to travel with family, full time for two years     Is patient current with immunizations? Yes.    He  reports that he has never smoked. He has never used smokeless tobacco. He reports current alcohol use. He reports that he does not use drugs.  Counseling given: Not Answered        DPA/Advanced directive: Patient has Advanced Directive on file.     ROS:    Gait: Uses no assistive device   Ostomy: No   Other tubes: No   Amputations: No   Chronic oxygen use No   Last eye exam 1.5 years ago   Wears hearing  aids: No   : Denies any urinary leakage during the last 6 months      Screening:        Depression Screening    Little interest or pleasure in doing things?  0 - not at all  Feeling down, depressed, or hopeless? 0 - not at all  Patient Health Questionnaire Score: 0    If depressive symptoms identified deferred to follow up visit unless specifically addressed in assessment and plan.    Interpretation of PHQ-9 Total Score   Score Severity   1-4 No Depression   5-9 Mild Depression   10-14 Moderate Depression   15-19 Moderately Severe Depression   20-27 Severe Depression    Screening for Cognitive Impairment    Three Minute Recall (captain, garden, picture)  1/3    Joel clock face with all 12 numbers and set the hands to show 5 past 8.  Yes    If cognitive concerns identified, deferred for follow up unless specifically addressed in assessment and plan.    Fall Risk Assessment    Has the patient had two or more falls in the last year or any fall with injury in the last year?  No  If fall risk identified, deferred for follow up unless specifically addressed in assessment and plan.    Safety Assessment    Throw rugs on floor.  Yes  Handrails on all stairs.  Yes  Good lighting in all hallways.  Yes  Difficulty hearing.  No  Patient counseled about all safety risks that were identified.    Functional Assessment ADLs    Are there any barriers preventing you from cooking for yourself or meeting nutritional needs?  No.    Are there any barriers preventing you from driving safely or obtaining transportation?  No.    Are there any barriers preventing you from using a telephone or calling for help?  No.    Are there any barriers preventing you from shopping?  No.    Are there any barriers preventing you from taking care of your own finances?  No.    Are there any barriers preventing you from managing your medications?  No.    Are there any barriers preventing you from showering, bathing or dressing yourself?  No.    Are you  "currently engaging in any exercise or physical activity?  Yes.  Gym 3-4x per week, walking 1-2 times per week  What is your perception of your health?  Good.    Health Maintenance Summary                HEPATITIS C SCREENING Overdue 1958     COVID-19 Vaccine Overdue 3/14/1970     IMM DTaP/Tdap/Td Vaccine Overdue 3/14/1977     COLOGUARD STOOL DNA Overdue 3/14/2008     IMM ZOSTER VACCINES Overdue 3/14/2008     Annual Wellness Visit Overdue 5/27/2021      Done 5/26/2020 Visit Dx: Medicare annual wellness visit, initial    IMM INFLUENZA Next Due 9/1/2021           Patient Care Team:  ALEX Fisher as PCP - General (Family Medicine)  [facility]    Social History     Tobacco Use   • Smoking status: Never Smoker   • Smokeless tobacco: Never Used   Vaping Use   • Vaping Use: Never used   Substance Use Topics   • Alcohol use: Yes     Alcohol/week: 0.0 oz     Comment: occasional   • Drug use: No     Family History   Problem Relation Age of Onset   • Heart Disease Mother         atrial fib   • Stroke Father 65        CVa   • Diabetes Father    • Cancer Sister         skin   • No Known Problems Brother    • Stroke Paternal Grandmother      He  has a past medical history of A-fib (HCC), At risk for sleep apnea, Atrial fibrillation (HCC), Cardiac asystole (HCC) (12/20/16), Cardiac pacemaker in situ (12/20/2016), Gout, Idiopathic chronic gout of right foot without tophus (3/13/2019), Pneumonia (2000), and Sleep apnea. He also has no past medical history of Asthma, Disorder of thyroid, or Heart valve disease.   Past Surgical History:   Procedure Laterality Date   • OTHER CARDIAC SURGERY  12/20/16    pacemaker   • OTHER ORTHOPEDIC SURGERY  6/1/1990    achilles tendon repair left foot           Exam:     /82 (BP Location: Right arm, Patient Position: Sitting, BP Cuff Size: Adult)   Pulse 65   Temp 36.4 °C (97.5 °F) (Temporal)   Ht 1.753 m (5' 9\")   Wt (!) 132 kg (290 lb)   " SpO2 94%  Body mass index is 42.83 kg/m².    Hearing fair.    Dentition good  Alert, oriented in no acute distress.  Eye contact is good, speech goal directed, affect calm      Assessment and Plan. The following treatment and monitoring plan is recommended:    1. History of atrial fibrillation  Stable post ablation  Continue metoprolol 25 mg BID  Continue follow up with cardiology  CBC WITH DIFFERENTIAL    Comp Metabolic Panel    TSH WITH REFLEX TO FT4    Subsequent Annual Wellness Visit - Includes PPPS ()    CT-CARDIAC SCORING   2. S/P ablation of atrial fibrillation  Stable post ablation  Continue metoprolol 25 mg BID  Continue follow up with cardiology  Subsequent Annual Wellness Visit - Includes PPPS ()    CT-CARDIAC SCORING   3. Cardiac pacemaker in situ  Stable post ablation  Continue metoprolol 25 mg BID  Continue follow up with cardiology  Subsequent Annual Wellness Visit - Includes PPPS ()   4. Atherosclerosis  Unstable  Scattered atherosclerosis seen on CT urogram in 2017  Check CT cardiac scoring, patient declines statin for now  CT-CARDIAC SCORING   5. Other specified personal risk factors, not elsewhere classified  CT-CARDIAC SCORING   6. ARCHANA (obstructive sleep apnea)  Stable  Continue CPAP nightly  Due for follow up with sleep medicine  Subsequent Annual Wellness Visit - Includes PPPS ()   7. Idiopathic chronic gout of right foot without tophus  Stable  Continue low purine diet  Continue allopuninol  Subsequent Annual Wellness Visit - Includes PPPS ()   8. Elevated glucose level  HEMOGLOBIN A1C    Subsequent Annual Wellness Visit - Includes PPPS ()   9. Morbid obesity with BMI of 40.0-44.9, adult (HCC)  Subsequent Annual Wellness Visit - Includes PPPS ()   10. Family history of diabetes mellitus (DM)  Subsequent Annual Wellness Visit - Includes PPPS ()   11. Encounter for screening for hematologic disorder  CBC WITH DIFFERENTIAL    Subsequent Annual Wellness  Visit - Includes PPPS ()   12. Screening for metabolic disorder  Comp Metabolic Panel    Subsequent Annual Wellness Visit - Includes PPPS ()   13. Screening, lipid  Lipid Profile    Subsequent Annual Wellness Visit - Includes PPPS ()   14. Encounter for vitamin deficiency screening  VITAMIN D,25 HYDROXY    Subsequent Annual Wellness Visit - Includes PPPS ()   15. Screening for prostate cancer  PSA TOTAL + %FREE    Subsequent Annual Wellness Visit - Includes PPPS ()   16. Encounter for hepatitis C virus screening test for high risk patient  HEP C VIRUS ANTIBODY    Subsequent Annual Wellness Visit - Includes PPPS ()   17. Screening for colorectal cancer  COLOGUARD (FIT DNA)    Subsequent Annual Wellness Visit - Includes PPPS ()         Services suggested: No services needed at this time  Health Care Screening recommendations as per orders if indicated.  Referrals offered: PT/OT/Nutrition counseling/Behavioral Health/Smoking cessation as per orders if indicated.    Discussion today about general wellness and lifestyle habits:    · Prevent falls and reduce trip hazards; Cautioned about securing or removing rugs.  · Have a working fire alarm and carbon monoxide detector;   · Engage in regular physical activity and social activities       Follow-up: Return in about 1 year (around 6/23/2022), or if symptoms worsen or fail to improve.

## 2021-07-21 ENCOUNTER — APPOINTMENT (OUTPATIENT)
Dept: RADIOLOGY | Facility: MEDICAL CENTER | Age: 61
End: 2021-07-21
Attending: NURSE PRACTITIONER
Payer: COMMERCIAL

## 2021-09-11 DIAGNOSIS — I48.91 ATRIAL FIBRILLATION, UNSPECIFIED TYPE (HCC): ICD-10-CM

## 2021-09-13 RX ORDER — METOPROLOL SUCCINATE 25 MG/1
TABLET, EXTENDED RELEASE ORAL
Qty: 180 TABLET | Refills: 1 | Status: SHIPPED | OUTPATIENT
Start: 2021-09-13 | End: 2022-03-01

## 2021-09-28 ENCOUNTER — TELEPHONE (OUTPATIENT)
Dept: CARDIOLOGY | Facility: MEDICAL CENTER | Age: 61
End: 2021-09-28

## 2021-09-29 NOTE — TELEPHONE ENCOUNTER
Tatiana is calling back in about her husbands symptoms. Pt states still a droopy face but is functioning fine.  She is not sure if they should go into ER or not. Would like some guidance. Tried reaching the nurse, no answer. Please reach out to the pt @ [phone]

## 2021-09-29 NOTE — TELEPHONE ENCOUNTER
Drooping on right side of face, feels great, no weakness noted on any extremities, no numbness/tingling, no slurred speech.     Had an off and on ear infection and was on antibiotics. They are currently in [facility].     Has been checking heart rate, no afib as he can tell.     Sent to ER for potential stroke

## 2021-10-04 ENCOUNTER — TELEPHONE (OUTPATIENT)
Dept: CARDIOLOGY | Facility: MEDICAL CENTER | Age: 61
End: 2021-10-04

## 2021-10-04 NOTE — TELEPHONE ENCOUNTER
Pt went to ER last week for bells palsy. Pt thinks that it could have been the steriods they put him on. Pt is back on xarelto. Pt sent remote transmission for evaluation

## 2021-10-04 NOTE — TELEPHONE ENCOUNTER
SS    Pt would like a nurse to give him a call, he is out of town, had Afib yesterday, is now back in sinus rhythm now, would like like to know if he needs to quita and appt.     Juan [phone]    Thank you,   Tana CHANDLER

## 2021-10-05 NOTE — TELEPHONE ENCOUNTER
Pts monitor is not connecting to remote monitor, attempted troubleshooting unsuccessfully. Gave pt [id number] pt assistance number to troubleshoot with them.

## 2021-10-26 ENCOUNTER — OFFICE VISIT (OUTPATIENT)
Dept: CARDIOLOGY | Facility: MEDICAL CENTER | Age: 61
End: 2021-10-26
Payer: MEDICARE

## 2021-10-26 VITALS
HEIGHT: 69 IN | OXYGEN SATURATION: 97 % | HEART RATE: 69 BPM | BODY MASS INDEX: 42.06 KG/M2 | DIASTOLIC BLOOD PRESSURE: 86 MMHG | WEIGHT: 284 LBS | SYSTOLIC BLOOD PRESSURE: 132 MMHG

## 2021-10-26 DIAGNOSIS — Z95.0 CARDIAC PACEMAKER IN SITU: ICD-10-CM

## 2021-10-26 DIAGNOSIS — I48.91 ATRIAL FIBRILLATION, UNSPECIFIED TYPE (HCC): ICD-10-CM

## 2021-10-26 DIAGNOSIS — I49.5 SSS (SICK SINUS SYNDROME) (HCC): ICD-10-CM

## 2021-10-26 DIAGNOSIS — Z86.79 S/P ABLATION OF ATRIAL FIBRILLATION: ICD-10-CM

## 2021-10-26 DIAGNOSIS — Z98.890 S/P ABLATION OF ATRIAL FIBRILLATION: ICD-10-CM

## 2021-10-26 PROCEDURE — 93280 PM DEVICE PROGR EVAL DUAL: CPT | Performed by: INTERNAL MEDICINE

## 2021-10-26 PROCEDURE — 99214 OFFICE O/P EST MOD 30 MIN: CPT | Mod: 25 | Performed by: INTERNAL MEDICINE

## 2021-10-26 NOTE — PROGRESS NOTES
Arrhythmia Clinic Note (Established patient)    DOS: 10/26/2021    Chief complaint/Reason for consult: F/u AF    Interval History:  Pt is a 62 yo. History of SS s/p PPM. Persistent AF. S/p initial PV isolation and CTI ablation with Dr. Raza. Subsequent recurrence. On Tikosyn then underwent repeat ablation. PVs had reconnection which was touched up and LA roof and floor line were completed isolating LA posterior wall. Anterior CFAEs were targeted as well. Ultimately did require cardioversion. Off Tikosyn he was doing well. More recently went on trip to [place] and caught some crab. Stressful trip as he had some issues at home with his gas and then his daughter was getting a divorce. He ended up getting Bell's palsy on his R side and got started on steroids, after which went back in what looks to be flutter for a couple days. Symptomatic. Resolved after he stopped the steroids.    ROS (+ highlighted in red):  General--Negative for fatigue, weight loss or weight gain  Cardiovascular--Negative for CP, orthopnea, PND    Past Medical History:   Diagnosis Date   • A-fib ([facility])    • At risk for sleep apnea    • Atrial fibrillation (HCC)    • Cardiac asystole ([facility]) 12/20/16    possible vaso-vagal   • Cardiac pacemaker in situ 12/20/2016   • Gout    • Idiopathic chronic gout of right foot without tophus 3/13/2019   • Pneumonia 2000   • Sleep apnea     CPAP       Past Surgical History:   Procedure Laterality Date   • OTHER CARDIAC SURGERY  12/20/16    pacemaker   • OTHER ORTHOPEDIC SURGERY  6/1/1990    achilles tendon repair left foot       Social History     Socioeconomic History   • Marital status:      Spouse name: Not on file   • Number of children: Not on file   • Years of education: Not on file   • Highest education level: Not on file   Occupational History   • Not on file   Tobacco Use   • Smoking status: Never Smoker   • Smokeless tobacco: Never Used   Vaping Use   • Vaping Use: Never used   Substance and Sexual  Activity   • Alcohol use: Yes     Alcohol/week: 0.0 oz     Comment: occasional   • Drug use: No   • Sexual activity: Not on file   Other Topics Concern   • Not on file   Social History Narrative   • Not on file     Social Determinants of Health     Financial Resource Strain:    • Difficulty of Paying Living Expenses:    Food Insecurity:    • Worried About Running Out of Food in the Last Year:    • Ran Out of Food in the Last Year:    Transportation Needs:    • Lack of Transportation (Medical):    • Lack of Transportation (Non-Medical):    Physical Activity:    • Days of Exercise per Week:    • Minutes of Exercise per Session:    Stress:    • Feeling of Stress :    Social Connections:    • Frequency of Communication with Friends and Family:    • Frequency of Social Gatherings with Friends and Family:    • Attends Druze Services:    • Active Member of Clubs or Organizations:    • Attends Club or Organization Meetings:    • Marital Status:    Intimate Partner Violence:    • Fear of Current or Ex-Partner:    • Emotionally Abused:    • Physically Abused:    • Sexually Abused:        Family History   Problem Relation Age of Onset   • Heart Disease Mother         atrial fib   • Stroke Father 65        CVa   • Diabetes Father    • Cancer Sister         skin   • No Known Problems Brother    • Stroke Paternal Grandmother        Allergies   Allergen Reactions   • Bloodless    • Codeine Vomiting   • Tramadol Vomiting and Nausea       Current Outpatient Medications   Medication Sig Dispense Refill   • rivaroxaban (XARELTO) 20 MG Tab tablet Take 1 Tablet by mouth with dinner. 30 Tablet 5   • metoprolol SR (TOPROL XL) 25 MG TABLET SR 24 HR TAKE 1 TABLET BY MOUTH TWICE A  Tablet 1   • allopurinol (ZYLOPRIM) 300 MG Tab TAKE 1 TABLET BY MOUTH EVERY DAY 90 Tablet 0   • POTASSIUM CHLORIDE PO Take  by mouth.     • Magnesium 500 MG Tab Take 1 Cap by mouth every day.       No current facility-administered medications for this  "visit.       Physical Exam:  Vitals:    10/26/21 1523   BP: 132/86   BP Location: Left arm   Patient Position: Sitting   BP Cuff Size: Adult   Pulse: 69   SpO2: 97%   Weight: (!) 129 kg (284 lb)   Height: 1.753 m (5' 9\")     General appearance: NAD, conversant  HEENT: PERRL, neck is supple with FROM  Lungs: Clear to auscultation, normal respiratory effort  CV: RRR, no murmurs/rubs/gallops, no JVD  Abdomen: Soft, non-tender with normal bowel sounds  Extremities: No peripheral edema, no clubbing or cyanosis  Skin: No rash, lesions, or ulcers  Psych: Alert and oriented to person, place and time    Data:  Labs reviewed    Prior echo/stress reviewed:  Normal EF    EKG interpreted by me:  NSR    Device check reviewed    Impression/Plan:  1. S/P ablation of atrial fibrillation     2. Atrial fibrillation, unspecified type (HCC)  EKG     -Device check reviewed, working appropriately  -History consistent with Bell's palsy and not CVA  -I will put him back on xarelto for a time being though given this recurrence  -If no further recurrence in 3-6 mo and burden remains low, CHADSVasc is low will likely stop OAC  -If further recurrence, consider repeat ablation as mitral flutter is possibility    Alexi Faith MD    "

## 2021-10-27 ENCOUNTER — TELEPHONE (OUTPATIENT)
Dept: CARDIOLOGY | Facility: MEDICAL CENTER | Age: 61
End: 2021-10-27

## 2021-11-09 LAB — EKG IMPRESSION: NORMAL

## 2021-11-09 PROCEDURE — 93000 ELECTROCARDIOGRAM COMPLETE: CPT | Performed by: INTERNAL MEDICINE

## 2021-11-22 RX ORDER — RIVAROXABAN 20 MG/1
TABLET, FILM COATED ORAL
Qty: 30 TABLET | Refills: 5 | Status: SHIPPED | OUTPATIENT
Start: 2021-11-22 | End: 2021-12-30

## 2021-11-29 DIAGNOSIS — M1A.0710 IDIOPATHIC CHRONIC GOUT OF RIGHT FOOT WITHOUT TOPHUS: ICD-10-CM

## 2021-11-29 RX ORDER — ALLOPURINOL 300 MG/1
300 TABLET ORAL
Qty: 90 TABLET | Refills: 0 | Status: SHIPPED | OUTPATIENT
Start: 2021-11-29 | End: 2022-03-09

## 2021-11-29 NOTE — TELEPHONE ENCOUNTER
Patient is currently out of town, traveling in [place]. angie send script there.    Received request via: Pharmacy    Was the patient seen in the last year in this department? Yes    Does the patient have an active prescription (recently filled or refills available) for medication(s) requested? No

## 2022-02-01 ENCOUNTER — OFFICE VISIT (OUTPATIENT)
Dept: CARDIOLOGY | Facility: MEDICAL CENTER | Age: 62
End: 2022-02-01
Payer: MEDICARE

## 2022-02-01 VITALS
BODY MASS INDEX: 42.8 KG/M2 | HEART RATE: 68 BPM | HEIGHT: 69 IN | RESPIRATION RATE: 14 BRPM | WEIGHT: 289 LBS | SYSTOLIC BLOOD PRESSURE: 128 MMHG | DIASTOLIC BLOOD PRESSURE: 88 MMHG | OXYGEN SATURATION: 96 %

## 2022-02-01 DIAGNOSIS — Z98.890 S/P ABLATION OF ATRIAL FIBRILLATION: ICD-10-CM

## 2022-02-01 DIAGNOSIS — I48.91 ATRIAL FIBRILLATION, UNSPECIFIED TYPE (HCC): ICD-10-CM

## 2022-02-01 DIAGNOSIS — Z86.79 S/P ABLATION OF ATRIAL FIBRILLATION: ICD-10-CM

## 2022-02-01 DIAGNOSIS — Z86.79 HISTORY OF ATRIAL FIBRILLATION: ICD-10-CM

## 2022-02-01 PROCEDURE — 93280 PM DEVICE PROGR EVAL DUAL: CPT | Performed by: INTERNAL MEDICINE

## 2022-02-01 PROCEDURE — 99214 OFFICE O/P EST MOD 30 MIN: CPT | Mod: 25 | Performed by: INTERNAL MEDICINE

## 2022-02-02 NOTE — PROGRESS NOTES
Arrhythmia Clinic Note (Established patient)    DOS: 2/1/2022    Chief complaint/Reason for consult: F/u AF    Interval History:  Pt is a 60 yo M. Well known to me. History of SSS s/p PPM and initially PV isolation and CTI ablation with Darion Raza, then recurrence on Tikosyn, had PV reconnection that was re-isolated and LA roof and floor lines were completed along with some CFAEs. He had done well for sometime before a stressful episode during a trip to [place] and his daughter getting a divorce all while getting Bell's Palsy requiring steroids and he ended up in sustained flutter for a few days that resolved after he stopped the steroids.    We put him back on OAC briefly but snow 3 mo later he has not had another episode. He is feeling good and back to get his boat from [place] to take back to [facility] where he is spending the winter.    ROS (+ highlighted in red):  General--Negative for fatigue, weight loss or weight gain  Cardiovascular--Negative for CP, orthopnea, PND    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/13/2019   • Pneumonia 2000   • Sleep apnea     CPAP       Past Surgical History:   Procedure Laterality Date   • OTHER CARDIAC SURGERY  12/20/16    pacemaker   • OTHER ORTHOPEDIC SURGERY  6/1/1990    achilles tendon repair left foot       Social History     Socioeconomic History   • Marital status:      Spouse name: Not on file   • Number of children: Not on file   • Years of education: Not on file   • Highest education level: Not on file   Occupational History   • Not on file   Tobacco Use   • Smoking status: Never Smoker   • Smokeless tobacco: Never Used   Vaping Use   • Vaping Use: Never used   Substance and Sexual Activity   • Alcohol use: Yes      Alcohol/week: 0.0 oz     Comment: occasional   • Drug use: No   • Sexual activity: Not on file   Other Topics Concern   • Not on file   Social History Narrative   • Not on file     Social Determinants of Health     Financial Resource Strain:    • Difficulty of Paying Living Expenses: Not on file   Food Insecurity:    • Worried About Running Out of Food in the Last Year: Not on file   • Ran Out of Food in the Last Year: Not on file   Transportation Needs:    • Lack of Transportation (Medical): Not on file   • Lack of Transportation (Non-Medical): Not on file   Physical Activity:    • Days of Exercise per Week: Not on file   • Minutes of Exercise per Session: Not on file   Stress:    • Feeling of Stress : Not on file   Social Connections:    • Frequency of Communication with Friends and Family: Not on file   • Frequency of Social Gatherings with Friends and Family: Not on file   • Attends Scientologist Services: Not on file   • Active Member of Clubs or Organizations: Not on file   • Attends Club or Organization Meetings: Not on file   • Marital Status: Not on file   Intimate Partner Violence:    • Fear of Current or Ex-Partner: Not on file   • Emotionally Abused: Not on file   • Physically Abused: Not on file   • Sexually Abused: Not on file   Housing Stability:    • Unable to Pay for Housing in the Last Year: Not on file   • Number of Places Lived in the Last Year: Not on file   • Unstable Housing in the Last Year: Not on file       Family History   Problem Relation Age of Onset   • Heart Disease Mother         atrial fib   • Stroke Father 65        CVa   • Diabetes Father    • Cancer Sister         skin   • No Known Problems Brother    • Stroke Paternal Grandmother        Allergies   Allergen Reactions   • Bloodless    • Codeine Vomiting   • Tramadol Vomiting and Nausea       Current Outpatient Medications   Medication Sig Dispense Refill   • XARELTO 20 MG Tab tablet TAKE 1 TABLET BY MOUTH EVERY DAY WITH DINNER 90  "Tablet 0   • allopurinol (ZYLOPRIM) 300 MG Tab Take 1 Tablet by mouth every day. 90 Tablet 0   • metoprolol SR (TOPROL XL) 25 MG TABLET SR 24 HR TAKE 1 TABLET BY MOUTH TWICE A  Tablet 1   • POTASSIUM CHLORIDE PO Take  by mouth.     • Magnesium 500 MG Tab Take 1 Cap by mouth every day.       No current facility-administered medications for this visit.       Physical Exam:  Vitals:    02/01/22 1435   BP: 128/88   BP Location: Left arm   Patient Position: Sitting   BP Cuff Size: Large adult   Pulse: 68   Resp: 14   SpO2: 96%   Weight: (!) 131 kg (289 lb)   Height: 1.753 m (5' 9\")     General appearance: NAD, conversant  HEENT: PERRL, neck is supple with FROM  Lungs: Clear to auscultation, normal respiratory effort  CV: RRR, no murmurs/rubs/gallops, no JVD  Abdomen: Soft, non-tender with normal bowel sounds  Extremities: No peripheral edema, no clubbing or cyanosis  Skin: No rash, lesions, or ulcers  Psych: Alert and oriented to person, place and time    Data:  Labs reviewed    Prior echo/stress reviewed:  LVEF 55%    Device check reviewed    Impression/Plan:  1. S/P ablation of atrial fibrillation     2. History of atrial fibrillation     3. Atrial fibrillation, unspecified type (HCC)  EKG     -His CHADSVasc is 1, and given very low burden of atrial arrhythmia and that he wants to be off OAC we made decision to stop  -Device working appropriately  -No changes to regimen other than cessation of xarelto  -F/u in 6 mo    Alexi Faith MD    "

## 2022-02-11 LAB — EKG IMPRESSION: NORMAL

## 2022-02-11 PROCEDURE — 93000 ELECTROCARDIOGRAM COMPLETE: CPT | Performed by: INTERNAL MEDICINE

## 2022-02-14 ENCOUNTER — DEVICE CHECK (OUTPATIENT)
Dept: CARDIOLOGY | Facility: MEDICAL CENTER | Age: 62
End: 2022-02-14

## 2022-02-14 ENCOUNTER — TELEPHONE (OUTPATIENT)
Dept: CARDIOLOGY | Facility: MEDICAL CENTER | Age: 62
End: 2022-02-14

## 2022-02-14 NOTE — NON-PROVIDER
Type of monitoring: Remote Monitoring    : [employer]    Date of Transmission: 2-    Type of device: PPM    Mode: DDDR    Rate: 60 ppm    Impedances:  Atrial: 587 ohms  Right Ventricular: 463 ohm    Battery status:3 yrs    Episodes: NSVT/VT episodes (stored egm's appear as noise on RV lead)    RV lead: polarity switch to Unipolar on 2-7-2022, impedance > 3000 ohms. Last occurrence of high lead impedance on 5-2021.  Presenting rhythm: Sinus rhythm  Ap: 70 %  : 0 %      To be scanned into media

## 2022-02-28 DIAGNOSIS — I48.91 ATRIAL FIBRILLATION, UNSPECIFIED TYPE (HCC): ICD-10-CM

## 2022-03-03 RX ORDER — METOPROLOL SUCCINATE 25 MG/1
TABLET, EXTENDED RELEASE ORAL
Qty: 180 TABLET | Refills: 3 | Status: SHIPPED | OUTPATIENT
Start: 2022-03-03 | End: 2022-11-10

## 2022-03-06 DIAGNOSIS — M1A.0710 IDIOPATHIC CHRONIC GOUT OF RIGHT FOOT WITHOUT TOPHUS: ICD-10-CM

## 2022-03-07 ENCOUNTER — TELEPHONE (OUTPATIENT)
Dept: CARDIOLOGY | Facility: MEDICAL CENTER | Age: 62
End: 2022-03-07
Payer: MEDICARE

## 2022-03-08 NOTE — TELEPHONE ENCOUNTER
Left message with direct line given for patient to schedule in office check for review of RV lead impedance and polarity switch.

## 2022-03-09 RX ORDER — ALLOPURINOL 300 MG/1
300 TABLET ORAL
Qty: 90 TABLET | Refills: 0 | Status: SHIPPED | OUTPATIENT
Start: 2022-03-09 | End: 2022-06-27

## 2022-04-04 ENCOUNTER — NON-PROVIDER VISIT (OUTPATIENT)
Dept: CARDIOLOGY | Facility: MEDICAL CENTER | Age: 62
End: 2022-04-04
Payer: MEDICARE

## 2022-04-04 DIAGNOSIS — Z95.0 CARDIAC PACEMAKER IN SITU: ICD-10-CM

## 2022-04-04 DIAGNOSIS — I49.5 SICK SINUS SYNDROME (HCC): ICD-10-CM

## 2022-04-04 PROCEDURE — 93280 PM DEVICE PROGR EVAL DUAL: CPT | Performed by: INTERNAL MEDICINE

## 2022-04-04 NOTE — NON-PROVIDER
RV polarity switch noted with impedance >3000ohms at baseline today.  Programmed RV unipolar pacing/bipolar sensing

## 2022-08-08 ENCOUNTER — NON-PROVIDER VISIT (OUTPATIENT)
Dept: CARDIOLOGY | Facility: MEDICAL CENTER | Age: 62
End: 2022-08-08
Payer: MEDICARE

## 2022-08-08 PROCEDURE — 93294 REM INTERROG EVL PM/LDLS PM: CPT | Performed by: INTERNAL MEDICINE

## 2022-08-09 NOTE — CARDIAC REMOTE MONITOR - SCAN
Device transmission reviewed. Device demonstrated appropriate function.       Electronically Signed by: Yao Jj M.D.    8/14/2022  11:04 PM

## 2022-08-12 ENCOUNTER — TELEPHONE (OUTPATIENT)
Dept: HEALTH INFORMATION MANAGEMENT | Facility: OTHER | Age: 62
End: 2022-08-12

## 2022-08-12 NOTE — TELEPHONE ENCOUNTER
Outcome: Left Message for patient to schedule an AWV    Please transfer to [facility] at [phone] when patient returns call.      Attempt # 1

## 2022-08-29 ENCOUNTER — OFFICE VISIT (OUTPATIENT)
Dept: CARDIOLOGY | Facility: MEDICAL CENTER | Age: 62
End: 2022-08-29
Payer: MEDICARE

## 2022-08-29 VITALS
RESPIRATION RATE: 16 BRPM | HEIGHT: 69 IN | HEART RATE: 61 BPM | WEIGHT: 288 LBS | DIASTOLIC BLOOD PRESSURE: 74 MMHG | SYSTOLIC BLOOD PRESSURE: 114 MMHG | BODY MASS INDEX: 42.65 KG/M2 | OXYGEN SATURATION: 94 %

## 2022-08-29 DIAGNOSIS — T82.110D PACEMAKER LEAD MALFUNCTION, SUBSEQUENT ENCOUNTER: ICD-10-CM

## 2022-08-29 DIAGNOSIS — Z86.79 HISTORY OF ATRIAL FIBRILLATION: ICD-10-CM

## 2022-08-29 DIAGNOSIS — Z95.0 CARDIAC PACEMAKER IN SITU: ICD-10-CM

## 2022-08-29 DIAGNOSIS — I49.5 SSS (SICK SINUS SYNDROME) (HCC): ICD-10-CM

## 2022-08-29 DIAGNOSIS — I48.91 ATRIAL FIBRILLATION, UNSPECIFIED TYPE (HCC): ICD-10-CM

## 2022-08-29 PROBLEM — T82.110A PACEMAKER LEAD MALFUNCTION: Status: ACTIVE | Noted: 2022-08-29

## 2022-08-29 PROCEDURE — 99214 OFFICE O/P EST MOD 30 MIN: CPT | Mod: 25 | Performed by: INTERNAL MEDICINE

## 2022-08-29 PROCEDURE — 93000 ELECTROCARDIOGRAM COMPLETE: CPT | Mod: 59 | Performed by: INTERNAL MEDICINE

## 2022-08-29 PROCEDURE — 93280 PM DEVICE PROGR EVAL DUAL: CPT | Performed by: INTERNAL MEDICINE

## 2022-08-29 NOTE — PROGRESS NOTES
Arrhythmia Clinic Note (Established patient)    DOS: 8/29/2022    Chief complaint/Reason for consult: F/u PPM and AF    Interval History:  Pt is a 63 yo M. History of SSS s/p PPM. Initially PVI + CTI line with Ry. Recurrence. Placed on Tikosyn. I took him back for repeat. PV reconnection was re-isolated. LA roof and floor lines were completed. Has had one sustained recurrence during period of stress since then. Since last visit burden on device has been 0%. No complaints today. Fine lead noise detected on RV lead which has been known issue.    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    Pneumonia 2000    Sleep apnea     CPAP       Past Surgical History:   Procedure Laterality Date    OTHER CARDIAC SURGERY  12/20/16    pacemaker    OTHER ORTHOPEDIC SURGERY  6/1/1990    achilles tendon repair left foot       Social History     Socioeconomic History    Marital status:      Spouse name: Not on file    Number of children: Not on file    Years of education: Not on file    Highest education level: Not on file   Occupational History    Not on file   Tobacco Use    Smoking status: Never    Smokeless tobacco: Never   Vaping Use    Vaping Use: Never used   Substance and Sexual Activity    Alcohol use: Yes     Alcohol/week: 0.0 oz     Comment: occasional    Drug use: No    Sexual activity: Not on file   Other Topics Concern    Not on file   Social History Narrative    Not on file     Social Determinants of Health     Financial Resource Strain: Not on file   Food Insecurity: Not on file   Transportation Needs: Not on file   Physical Activity:  "Not on file   Stress: Not on file   Social Connections: Not on file   Intimate Partner Violence: Not on file   Housing Stability: Not on file       Family History   Problem Relation Age of Onset    Heart Disease Mother         atrial fib    Stroke Father 65        CVa    Diabetes Father     Cancer Sister         skin    No Known Problems Brother     Stroke Paternal Grandmother        Allergies   Allergen Reactions    Bloodless     Codeine Vomiting    Tramadol Vomiting and Nausea       Current Outpatient Medications   Medication Sig Dispense Refill    allopurinol (ZYLOPRIM) 300 MG Tab TAKE 1 TABLET BY MOUTH EVERY DAY 90 Tablet 0    metoprolol SR (TOPROL XL) 25 MG TABLET SR 24 HR TAKE 1 TABLET BY MOUTH TWICE A  Tablet 3    POTASSIUM CHLORIDE PO Take  by mouth.      Magnesium 500 MG Tab Take 1 Cap by mouth every day.       No current facility-administered medications for this visit.       Physical Exam:  Vitals:    08/29/22 1413   BP: 114/74   BP Location: Left arm   Patient Position: Sitting   BP Cuff Size: Adult   Pulse: 61   Resp: 16   SpO2: 94%   Weight: (!) 131 kg (288 lb)   Height: 1.753 m (5' 9\")     General appearance: NAD, conversant  HEENT: PERRL, neck is supple with FROM  Lungs: Clear to auscultation, normal respiratory effort  CV: RRR, no murmurs/rubs/gallops, no JVD  Abdomen: Soft, non-tender with normal bowel sounds  Extremities: No peripheral edema, no clubbing or cyanosis  Skin: No rash, lesions, or ulcers  Psych: Alert and oriented to person, place and time    Data:  Labs reviewed    Prior echo/stress reviewed:  LVEF 55%    EKG interpreted by me:  Sinus    Impression/Plan:  1. History of atrial fibrillation        2. Atrial fibrillation, unspecified type (HCC)  EKG      3. Cardiac pacemaker in situ        4. Pacemaker lead malfunction, subsequent encounter        -Doing well  -Lead noise showing up as \"VT\" on device   -Minimal RV pacing and I do not think will interfere enough to cause symptoms " from inhibition for him  -Plan to wait until closer to gen change, plan for lead replacement then  -AF burden is 0%, still off OAC    Alexi Faith MD

## 2022-09-22 DIAGNOSIS — Z13.21 ENCOUNTER FOR VITAMIN DEFICIENCY SCREENING: ICD-10-CM

## 2022-09-22 DIAGNOSIS — M1A.0710 IDIOPATHIC CHRONIC GOUT OF RIGHT FOOT WITHOUT TOPHUS: ICD-10-CM

## 2022-09-22 DIAGNOSIS — Z13.220 ENCOUNTER FOR SCREENING FOR LIPID DISORDER: ICD-10-CM

## 2022-09-22 DIAGNOSIS — Z13.0 ENCOUNTER FOR SCREENING FOR HEMATOLOGIC DISORDER: ICD-10-CM

## 2022-09-22 DIAGNOSIS — Z12.5 SCREENING PSA (PROSTATE SPECIFIC ANTIGEN): ICD-10-CM

## 2022-09-22 DIAGNOSIS — Z13.228 ENCOUNTER FOR SCREENING FOR METABOLIC DISORDER: ICD-10-CM

## 2022-09-22 DIAGNOSIS — R73.03 PREDIABETES: ICD-10-CM

## 2022-09-22 DIAGNOSIS — E78.00 PURE HYPERCHOLESTEROLEMIA: ICD-10-CM

## 2022-09-22 DIAGNOSIS — Z13.29 SCREENING FOR THYROID DISORDER: ICD-10-CM

## 2022-09-23 RX ORDER — ALLOPURINOL 300 MG/1
300 TABLET ORAL
Qty: 90 TABLET | Refills: 0 | Status: SHIPPED | OUTPATIENT
Start: 2022-09-23 | End: 2022-12-29

## 2022-09-23 NOTE — TELEPHONE ENCOUNTER
LAST refill. Patient is due for annual appointment and labs, fasting labs ordered. He MUST have labs done prior to appointment. Please call and schedule patient.    SUE Fisher.

## 2022-09-26 ENCOUNTER — TELEPHONE (OUTPATIENT)
Dept: MEDICAL GROUP | Facility: MEDICAL CENTER | Age: 62
End: 2022-09-26
Payer: MEDICARE

## 2022-09-26 NOTE — TELEPHONE ENCOUNTER
Per  Norma called patient to inform him that Mrs Green refilled his allopurinol this once he is due for his annual and labs per Mrs Green wants him to due his labs prior to his appt explained this to patient he verb understanding and schled him for 10/24/2022 no further questions asked

## 2022-10-04 LAB — EKG IMPRESSION: NORMAL

## 2022-10-20 LAB
25(OH)D3+25(OH)D2 SERPL-MCNC: 17.9 NG/ML (ref 30–100)
ALBUMIN SERPL-MCNC: 4.2 G/DL (ref 3.8–4.8)
ALBUMIN/GLOB SERPL: 1.6 {RATIO} (ref 1.2–2.2)
ALP SERPL-CCNC: 96 IU/L (ref 44–121)
ALT SERPL-CCNC: 46 IU/L (ref 0–44)
AST SERPL-CCNC: 26 IU/L (ref 0–40)
BASOPHILS # BLD AUTO: 0.1 X10E3/UL (ref 0–0.2)
BASOPHILS NFR BLD AUTO: 1 %
BILIRUB SERPL-MCNC: 0.4 MG/DL (ref 0–1.2)
BUN SERPL-MCNC: 13 MG/DL (ref 8–27)
BUN/CREAT SERPL: 14 (ref 10–24)
CALCIUM SERPL-MCNC: 9.1 MG/DL (ref 8.6–10.2)
CHLORIDE SERPL-SCNC: 104 MMOL/L (ref 96–106)
CHOLEST SERPL-MCNC: 187 MG/DL (ref 100–199)
CO2 SERPL-SCNC: 24 MMOL/L (ref 20–29)
CREAT SERPL-MCNC: 0.96 MG/DL (ref 0.76–1.27)
EGFRCR SERPLBLD CKD-EPI 2021: 88 ML/MIN/1.73
EOSINOPHIL # BLD AUTO: 0.1 X10E3/UL (ref 0–0.4)
EOSINOPHIL NFR BLD AUTO: 2 %
ERYTHROCYTE [DISTWIDTH] IN BLOOD BY AUTOMATED COUNT: 13.1 % (ref 11.6–15.4)
GLOBULIN SER CALC-MCNC: 2.6 G/DL (ref 1.5–4.5)
GLUCOSE SERPL-MCNC: 111 MG/DL (ref 70–99)
HBA1C MFR BLD: 5.8 % (ref 4.8–5.6)
HCT VFR BLD AUTO: 44.7 % (ref 37.5–51)
HDLC SERPL-MCNC: 34 MG/DL
HGB BLD-MCNC: 15.3 G/DL (ref 13–17.7)
IMM GRANULOCYTES # BLD AUTO: 0 X10E3/UL (ref 0–0.1)
IMM GRANULOCYTES NFR BLD AUTO: 0 %
IMMATURE CELLS  115398: NORMAL
LABORATORY COMMENT REPORT: ABNORMAL
LDLC SERPL CALC-MCNC: 130 MG/DL (ref 0–99)
LYMPHOCYTES # BLD AUTO: 1.6 X10E3/UL (ref 0.7–3.1)
LYMPHOCYTES NFR BLD AUTO: 25 %
MCH RBC QN AUTO: 31.5 PG (ref 26.6–33)
MCHC RBC AUTO-ENTMCNC: 34.2 G/DL (ref 31.5–35.7)
MCV RBC AUTO: 92 FL (ref 79–97)
MONOCYTES # BLD AUTO: 0.5 X10E3/UL (ref 0.1–0.9)
MONOCYTES NFR BLD AUTO: 8 %
MORPHOLOGY BLD-IMP: NORMAL
NEUTROPHILS # BLD AUTO: 4.1 X10E3/UL (ref 1.4–7)
NEUTROPHILS NFR BLD AUTO: 64 %
NRBC BLD AUTO-RTO: NORMAL %
PLATELET # BLD AUTO: 218 X10E3/UL (ref 150–450)
POTASSIUM SERPL-SCNC: 4.5 MMOL/L (ref 3.5–5.2)
PROT SERPL-MCNC: 6.8 G/DL (ref 6–8.5)
PSA FREE MFR SERPL: 28.3 %
PSA FREE SERPL-MCNC: 0.17 NG/ML
PSA SERPL-MCNC: 0.6 NG/ML (ref 0–4)
RBC # BLD AUTO: 4.86 X10E6/UL (ref 4.14–5.8)
SODIUM SERPL-SCNC: 141 MMOL/L (ref 134–144)
T4 FREE SERPL-MCNC: 0.91 NG/DL (ref 0.82–1.77)
TRIGL SERPL-MCNC: 124 MG/DL (ref 0–149)
TSH SERPL DL<=0.005 MIU/L-ACNC: 2.4 UIU/ML (ref 0.45–4.5)
URATE SERPL-MCNC: 5.5 MG/DL (ref 3.8–8.4)
VLDLC SERPL CALC-MCNC: 23 MG/DL (ref 5–40)
WBC # BLD AUTO: 6.4 X10E3/UL (ref 3.4–10.8)

## 2022-10-24 ENCOUNTER — OFFICE VISIT (OUTPATIENT)
Dept: MEDICAL GROUP | Facility: MEDICAL CENTER | Age: 62
End: 2022-10-24
Payer: MEDICARE

## 2022-10-24 VITALS
WEIGHT: 296.4 LBS | OXYGEN SATURATION: 96 % | DIASTOLIC BLOOD PRESSURE: 90 MMHG | TEMPERATURE: 97.8 F | SYSTOLIC BLOOD PRESSURE: 130 MMHG | HEIGHT: 69 IN | HEART RATE: 60 BPM | BODY MASS INDEX: 43.9 KG/M2

## 2022-10-24 DIAGNOSIS — R73.03 PREDIABETES: ICD-10-CM

## 2022-10-24 DIAGNOSIS — G47.33 OSA (OBSTRUCTIVE SLEEP APNEA): ICD-10-CM

## 2022-10-24 DIAGNOSIS — Z23 NEED FOR VACCINATION: ICD-10-CM

## 2022-10-24 DIAGNOSIS — Z95.0 CARDIAC PACEMAKER IN SITU: ICD-10-CM

## 2022-10-24 DIAGNOSIS — M1A.0710 IDIOPATHIC CHRONIC GOUT OF RIGHT FOOT WITHOUT TOPHUS: ICD-10-CM

## 2022-10-24 DIAGNOSIS — Z86.79 HISTORY OF ATRIAL FIBRILLATION: ICD-10-CM

## 2022-10-24 DIAGNOSIS — Z12.12 SCREENING FOR COLORECTAL CANCER: ICD-10-CM

## 2022-10-24 DIAGNOSIS — Z12.11 SCREENING FOR COLORECTAL CANCER: ICD-10-CM

## 2022-10-24 DIAGNOSIS — E66.01 MORBID OBESITY WITH BMI OF 40.0-44.9, ADULT (HCC): ICD-10-CM

## 2022-10-24 DIAGNOSIS — Z98.890 S/P ABLATION OF ATRIAL FIBRILLATION: ICD-10-CM

## 2022-10-24 DIAGNOSIS — Z91.89 OTHER SPECIFIED PERSONAL RISK FACTORS, NOT ELSEWHERE CLASSIFIED: ICD-10-CM

## 2022-10-24 DIAGNOSIS — E66.01 MORBID (SEVERE) OBESITY DUE TO EXCESS CALORIES (HCC): ICD-10-CM

## 2022-10-24 DIAGNOSIS — Z86.79 S/P ABLATION OF ATRIAL FIBRILLATION: ICD-10-CM

## 2022-10-24 DIAGNOSIS — E78.00 PURE HYPERCHOLESTEROLEMIA: ICD-10-CM

## 2022-10-24 DIAGNOSIS — Z00.00 MEDICARE ANNUAL WELLNESS VISIT, SUBSEQUENT: ICD-10-CM

## 2022-10-24 PROCEDURE — G0439 PPPS, SUBSEQ VISIT: HCPCS | Performed by: NURSE PRACTITIONER

## 2022-10-24 PROCEDURE — G0008 ADMIN INFLUENZA VIRUS VAC: HCPCS | Performed by: NURSE PRACTITIONER

## 2022-10-24 PROCEDURE — 90686 IIV4 VACC NO PRSV 0.5 ML IM: CPT | Performed by: NURSE PRACTITIONER

## 2022-10-24 ASSESSMENT — ENCOUNTER SYMPTOMS: GENERAL WELL-BEING: GOOD

## 2022-10-24 ASSESSMENT — ACTIVITIES OF DAILY LIVING (ADL): BATHING_REQUIRES_ASSISTANCE: 0

## 2022-10-24 ASSESSMENT — FIBROSIS 4 INDEX: FIB4 SCORE: 1.13

## 2022-10-24 NOTE — PROGRESS NOTES
No chief complaint on file.      HPI:  Juan Martel is a 62 y.o. here for Medicare Annual Wellness Visit     Patient Active Problem List    Diagnosis Date Noted    Prediabetes 10/24/2022    Pure hypercholesterolemia 10/24/2022    Pacemaker lead malfunction 08/29/2022    Morbid obesity with BMI of 40.0-44.9, adult (HCC) 05/26/2020    Family history of diabetes mellitus (DM) 03/13/2019    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    S/P ablation of atrial fibrillation 02/02/2017    Cardiac pacemaker in situ 12/20/2016    ARCHANA (obstructive sleep apnea) 06/15/2016    History of atrial fibrillation 04/18/2016       Current Outpatient Medications   Medication Sig Dispense Refill    allopurinol (ZYLOPRIM) 300 MG Tab Take 1 Tablet by mouth every day. LAST refill. Patient needs labs and follow up with PCP for further refills. 90 Tablet 0    POTASSIUM CHLORIDE PO Take  by mouth.      Magnesium 500 MG Tab Take 1 Cap by mouth every day.      metoprolol SR (TOPROL XL) 50 MG TABLET SR 24 HR Take 1 Tablet by mouth 2 times a day. 180 Tablet 1    rivaroxaban (XARELTO) 20 MG Tab tablet Take 1 Tablet by mouth with dinner. 90 Tablet 1     No current facility-administered medications for this visit.          Current supplements as per medication list.     Allergies: Bloodless, Codeine, and Tramadol    Current social contact/activities: travel with wife, time with family    He  reports that he has never smoked. He has never used smokeless tobacco. He reports current alcohol use. He reports that he does not use drugs.  Counseling given: Not Answered      ROS:    Gait: Uses no assistive device  Ostomy: No  Other tubes: No  Amputations: No  Chronic oxygen use: No  Last eye exam: 2 years ago, due, plans to schedule  Wears hearing aids: No   : Denies any urinary leakage during the last 6 months    Screening:    Depression Screening  Little interest or pleasure in doing things?     Feeling down, depressed , or  hopeless?    Patient Health Questionnaire Score:       If depressive symptoms identified deferred to follow up visit unless specifically addressed in assessment and plan.    Interpretation of PHQ-9 Total Score   Score Severity   1-4 No Depression   5-9 Mild Depression   10-14 Moderate Depression   15-19 Moderately Severe Depression   20-27 Severe Depression    Screening for Cognitive Impairment  Three Minute Recall (daughter, heaven, mountain) 3/3    Joel clock face with all 12 numbers and set the hands to show 10 past 11.       Cognitive concerns identified deferred for follow up unless specifically addressed in assessment and plan.    Fall Risk Assessment  Has the patient had two or more falls in the last year or any fall with injury in the last year?  No    Safety Assessment  Throw rugs on floor.  No  Handrails on all stairs.  No  Good lighting in all hallways.  Yes  Difficulty hearing.  Yes  Patient counseled about all safety risks that were identified.    Functional Assessment ADLs  Are there any barriers preventing you from cooking for yourself or meeting nutritional needs?  No.    Are there any barriers preventing you from driving safely or obtaining transportation?  No.    Are there any barriers preventing you from using a telephone or calling for help?  No.    Are there any barriers preventing you from shopping?  No.    Are there any barriers preventing you from taking care of your own finances?  No.    Are there any barriers preventing you from managing your medications?  No.    Are there any barriers preventing you from showering, bathing or dressing yourself?  No.    Are you currently engaging in any exercise or physical activity?  Yes.  Lift weights 3 times a week   What is your perception of your health?  Good    Advance Care Planning  Do you have an Advance Directive, Living Will, Durable Power of , or POLST? Yes  Advance Directive Living Will Durable Power of    is not on file -  instructed patient to bring in a copy to scan into their chart      Health Maintenance Summary            Overdue - IMM HEP B VACCINE (1 of 3 - 3-dose series) Overdue - never done      No completion history exists for this topic.              Overdue - HEPATITIS C SCREENING (Once) Overdue - never done      No completion history exists for this topic.              Overdue - IMM DTaP/Tdap/Td Vaccine (1 - Tdap) Overdue - never done      No completion history exists for this topic.              Ordered - COLORECTAL CANCER SCREENING (COLONOSCOPY - Every 10 Years) Ordered on 10/24/2022      No completion history exists for this topic.              Overdue - IMM ZOSTER VACCINES (1 of 2) Overdue - never done      No completion history exists for this topic.              Overdue - COVID-19 Vaccine (3 - Booster for Pfizer series) Overdue since 5/26/2021 03/31/2021  Imm Admin: PFIZER PURPLE CAP SARS-COV-2 VACCINATION (12+)    03/10/2021  Imm Admin: PFIZER PURPLE CAP SARS-COV-2 VACCINATION (12+)              Annual Wellness Visit (Every 366 Days) Next due on 10/25/2023      10/24/2022  Visit Dx: Medicare annual wellness visit, subsequent    06/23/2021  Subsequent Annual Wellness Visit - Includes PPPS ()    06/23/2021  Visit Dx: Medicare annual wellness visit, subsequent    05/26/2020  Visit Dx: Medicare annual wellness visit, initial              IMM PNEUMOCOCCAL VACCINE: 0-64 Years (Series Information) Aged Out      03/13/2019  Imm Admin: Pneumococcal polysaccharide vaccine (PPSV-23)              IMM INFLUENZA (Series Information) Completed      10/24/2022  Imm Admin: Influenza Vaccine Quad Inj (Pf)              IMM MENINGOCOCCAL ACWY VACCINE (Series Information) Aged Out      No completion history exists for this topic.                    Patient Care Team:  ALEX Fisher as PCP - General (Family Medicine)  [facility] as Res[facility] Therapist        Social History     Tobacco Use    Smoking  "status: Never    Smokeless tobacco: Never   Vaping Use    Vaping Use: Never used   Substance Use Topics    Alcohol use: Yes     Alcohol/week: 0.0 oz     Comment: occasional    Drug use: No     Family History   Problem Relation Age of Onset    Heart Disease Mother         atrial fib    Stroke Father 65        CVa    Diabetes Father     Cancer Sister         skin    No Known Problems Brother     Stroke Paternal Grandmother      He  has a past medical history of A-fib (HCC), At risk for sleep apnea, Atrial fibrillation (HCC), Cardiac asystole (HCC) (12/20/16), Cardiac pacemaker in situ (12/20/2016), Gout, Idiopathic chronic gout of right foot without tophus (3/13/2019), Pneumonia (2000), and Sleep apnea.    He has no past medical history of Asthma, Disorder of thyroid, or Heart valve disease.   Past Surgical History:   Procedure Laterality Date    OTHER CARDIAC SURGERY  12/20/16    pacemaker    OTHER ORTHOPEDIC SURGERY  6/1/1990    achilles tendon repair left foot       Exam:   BP (!) 130/90 (BP Location: Right arm, Patient Position: Sitting, BP Cuff Size: Adult)   Pulse 60   Temp 36.6 °C (97.8 °F) (Temporal)   Ht 1.753 m (5' 9\")   Wt (!) 134 kg (296 lb 6.4 oz)   SpO2 96%  Body mass index is 43.77 kg/m².    Hearing fair.    Dentition good  Alert, oriented in no acute distress.  Eye contact is good, speech goal directed, affect calm    Assessment and Plan. The following treatment and monitoring plan is recommended:      1. Medicare annual wellness visit, subsequent    2. Prediabetes  New diagnosis today    3. History of atrial fibrillation  Stable  Continue medication and follow up with cardiology    4. S/P ablation of atrial fibrillation  Stable  Continue medication and follow up with cardiology    5. Cardiac pacemaker in situ  Stable  Continue medication and follow up with cardiology    6. Idiopathic chronic gout of right foot without tophus  Stable  Continue allopurinol daily    7. ARCHANA (obstructive sleep " apnea)  Stable  Continue CPAP use nightly     8. Pure hypercholesterolemia  Unstable  The 10-year ASCVD risk score (Andrea EDMOND, et al., 2019) is: 14.5%  No family history MI.   CT renal colic in 2017 showed mild arterial atherosclerosis  Check CT cardiac score for further restratification to see if atherosclerosis burden has worsened    9. Morbid obesity with BMI of 40.0-44.9, adult (HCC)  - Patient identified as having weight management issue.  Appropriate orders and counseling given.    10. Screening for colorectal cancer  - COLOGUARD (FIT DNA)    11. Morbid (severe) obesity due to excess calories (HCC)  Diet, exercise, weight loss    12. Body mass index (BMI) 40.0-44.9, adult (HCC)  Diet, exercise, weight loss  13. Need for vaccination  - INFLUENZA VACCINE QUAD INJ (PF)      Services suggested: No services needed at this time  Health Care Screening: Age-appropriate preventive services recommended by USPTF and ACIP covered by Medicare were discussed today. Services ordered if indicated and agreed upon by the patient.  Referrals offered: Community-based lifestyle interventions to reduce health risks and promote self-management and wellness, fall prevention, nutrition, physical activity, tobacco-use cessation, weight loss, and mental health services as per orders if indicated.    Discussion today about general wellness and lifestyle habits:    Prevent falls and reduce trip hazards; Cautioned about securing or removing rugs.  Have a working fire alarm and carbon monoxide detector;   Engage in regular physical activity and social activities     Follow-up: Return in about 1 year (around 10/24/2023).

## 2022-10-24 NOTE — PROGRESS NOTES
No chief complaint on file.      HPI:  Juan Martel is a 62 y.o. here for Medicare Annual Wellness Visit     Patient Active Problem List    Diagnosis Date Noted    Prediabetes 10/24/2022    Pure hypercholesterolemia 10/24/2022    Pacemaker lead malfunction 08/29/2022    Morbid obesity with BMI of 40.0-44.9, adult (HCC) 05/26/2020    Family history of diabetes mellitus (DM) 03/13/2019    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    S/P ablation of atrial fibrillation 02/02/2017    Cardiac pacemaker in situ 12/20/2016    ARCHANA (obstructive sleep apnea) 06/15/2016    History of atrial fibrillation 04/18/2016       Current Outpatient Medications   Medication Sig Dispense Refill    allopurinol (ZYLOPRIM) 300 MG Tab Take 1 Tablet by mouth every day. LAST refill. Patient needs labs and follow up with PCP for further refills. 90 Tablet 0    metoprolol SR (TOPROL XL) 25 MG TABLET SR 24 HR TAKE 1 TABLET BY MOUTH TWICE A  Tablet 3    POTASSIUM CHLORIDE PO Take  by mouth.      Magnesium 500 MG Tab Take 1 Cap by mouth every day.       No current facility-administered medications for this visit.          Current supplements as per medication list.     Allergies: Bloodless, Codeine, and Tramadol    Current social contact/activities: ***     He  reports that he has never smoked. He has never used smokeless tobacco. He reports current alcohol use. He reports that he does not use drugs.  Counseling given: Not Answered      ROS:    Gait: Uses no assistive device  Ostomy: No  Other tubes: No  Amputations: No  Chronic oxygen use: No  Last eye exam: ***  Wears hearing aids: No   : Denies any urinary leakage during the last 6 months    Screening:  ***  Depression Screening  Little interest or pleasure in doing things?  0 - not at all  Feeling down, depressed , or hopeless? 0 - not at all  Patient Health Questionnaire Score: 0     If depressive symptoms identified deferred to follow up visit unless  specifically addressed in assessment and plan.    Interpretation of PHQ-9 Total Score   Score Severity   1-4 No Depression   5-9 Mild Depression   10-14 Moderate Depression   15-19 Moderately Severe Depression   20-27 Severe Depression    Screening for Cognitive Impairment  Three Minute Recall (daughter, heaven, mountain)  /3    Joel clock face with all 12 numbers and set the hands to show 10 past 11.       Cognitive concerns identified deferred for follow up unless specifically addressed in assessment and plan.    Fall Risk Assessment  Has the patient had two or more falls in the last year or any fall with injury in the last year?       Safety Assessment  Throw rugs on floor.     Handrails on all stairs.     Good lighting in all hallways.     Difficulty hearing.     Patient counseled about all safety risks that were identified.    Functional Assessment ADLs  Are there any barriers preventing you from cooking for yourself or meeting nutritional needs?   .    Are there any barriers preventing you from driving safely or obtaining transportation?   .    Are there any barriers preventing you from using a telephone or calling for help?   .    Are there any barriers preventing you from shopping?   .    Are there any barriers preventing you from taking care of your own finances?   .    Are there any barriers preventing you from managing your medications?   .    Are there any barriers preventing you from showering, bathing or dressing yourself?   .    Are you currently engaging in any exercise or physical activity?   .     What is your perception of your health?       Advance Care Planning  Do you have an Advance Directive, Living Will, Durable Power of , or POLST?                   Health Maintenance Summary            Overdue - IMM HEP B VACCINE (1 of 3 - 3-dose series) Overdue - never done      No completion history exists for this topic.              Overdue - HEPATITIS C SCREENING (Once) Overdue - never done       No completion history exists for this topic.              Overdue - IMM DTaP/Tdap/Td Vaccine (1 - Tdap) Overdue - never done      No completion history exists for this topic.              Ordered - COLORECTAL CANCER SCREENING (COLONOSCOPY - Every 10 Years) Ordered on 10/24/2022      No completion history exists for this topic.              Overdue - IMM ZOSTER VACCINES (1 of 2) Overdue - never done      No completion history exists for this topic.              Overdue - COVID-19 Vaccine (3 - Booster for Pfizer series) Overdue since 5/26/2021 03/31/2021  Imm Admin: PFIZER PURPLE CAP SARS-COV-2 VACCINATION (12+)    03/10/2021  Imm Admin: PFIZER PURPLE CAP SARS-COV-2 VACCINATION (12+)              IMM PNEUMOCOCCAL VACCINE: 0-64 Years (Series Information) Aged Out      03/13/2019  Imm Admin: Pneumococcal polysaccharide vaccine (PPSV-23)              IMM INFLUENZA (Series Information) Completed      10/24/2022  Imm Admin: Influenza Vaccine Quad Inj (Pf)              IMM MENINGOCOCCAL ACWY VACCINE (Series Information) Aged Out      No completion history exists for this topic.                    Patient Care Team:  ALEX Fisher as PCP - General (Family Medicine)  [facility] as [facility] Therapist        Social History     Tobacco Use    Smoking status: Never    Smokeless tobacco: Never   Vaping Use    Vaping Use: Never used   Substance Use Topics    Alcohol use: Yes     Alcohol/week: 0.0 oz     Comment: occasional    Drug use: No     Family History   Problem Relation Age of Onset    Heart Disease Mother         atrial fib    Stroke Father 65        CVa    Diabetes Father     Cancer Sister         skin    No Known Problems Brother     Stroke Paternal Grandmother      He  has a past medical history of A-fib (HCC), At risk for sleep apnea, Atrial fibrillation (HCC), Cardiac asystole (HCC) (12/20/16), Cardiac pacemaker in situ (12/20/2016), Gout, Idiopathic chronic gout of right foot without  "tophus (3/13/2019), Pneumonia (2000), and Sleep apnea.    He has no past medical history of Asthma, Disorder of thyroid, or Heart valve disease.   Past Surgical History:   Procedure Laterality Date    OTHER CARDIAC SURGERY  12/20/16    pacemaker    OTHER ORTHOPEDIC SURGERY  6/1/1990    achilles tendon repair left foot       Exam:   BP (!) 130/90 (BP Location: Right arm, Patient Position: Sitting, BP Cuff Size: Adult)   Pulse 60   Temp 36.6 °C (97.8 °F) (Temporal)   Ht 1.753 m (5' 9\")   Wt (!) 134 kg (296 lb 6.4 oz)   SpO2 96%  Body mass index is 43.77 kg/m².    Hearing fair.    Dentition fair  Alert, oriented in no acute distress.  Eye contact is good, speech goal directed, affect calm    Assessment and Plan. The following treatment and monitoring plan is recommended:  ***  1. Medicare annual wellness visit, subsequent    2. Prediabetes    3. History of atrial fibrillation    4. S/P ablation of atrial fibrillation    5. Cardiac pacemaker in situ    6. Idiopathic chronic gout of right foot without tophus    7. ARCHANA (obstructive sleep apnea)    8. Pure hypercholesterolemia  - CT-CARDIAC SCORING; Future    9. Morbid obesity with BMI of 40.0-44.9, adult (HCC)  - Patient identified as having weight management issue.  Appropriate orders and counseling given.    10. Screening for colorectal cancer  - COLOGUARD (FIT DNA)    11. Morbid (severe) obesity due to excess calories (HCC)    12. Body mass index (BMI) 40.0-44.9, adult (HCC)    13. Need for vaccination  - INFLUENZA VACCINE QUAD INJ (PF)    14. Other specified personal risk factors, not elsewhere classified  - CT-CARDIAC SCORING; Future      Services suggested: { AWV COORDINATION OF SERVICES:20405}  Health Care Screening: Age-appropriate preventive services recommended by USPTF and ACIP covered by Medicare were discussed today. Services ordered if indicated and agreed upon by the patient.  Referrals offered: Community-based lifestyle interventions to reduce health " risks and promote self-management and wellness, fall prevention, nutrition, physical activity, tobacco-use cessation, weight loss, and mental health services as per orders if indicated.    Discussion today about general wellness and lifestyle habits:    Prevent falls and reduce trip hazards; Cautioned about securing or removing rugs.  Have a working fire alarm and carbon monoxide detector;   Engage in regular physical activity and social activities     Follow-up: No follow-ups on file.

## 2022-11-04 ENCOUNTER — PATIENT MESSAGE (OUTPATIENT)
Dept: HEALTH INFORMATION MANAGEMENT | Facility: OTHER | Age: 62
End: 2022-11-04

## 2022-11-07 ENCOUNTER — NON-PROVIDER VISIT (OUTPATIENT)
Dept: CARDIOLOGY | Facility: MEDICAL CENTER | Age: 62
End: 2022-11-07
Payer: MEDICARE

## 2022-11-07 PROCEDURE — 93294 REM INTERROG EVL PM/LDLS PM: CPT | Performed by: INTERNAL MEDICINE

## 2022-11-08 NOTE — CARDIAC REMOTE MONITOR - SCAN
Device transmission reviewed. Device demonstrated appropriate function.       Electronically Signed by: Giovani Pena M.D.    11/9/2022  8:41 AM

## 2022-11-10 ENCOUNTER — TELEPHONE (OUTPATIENT)
Dept: CARDIOLOGY | Facility: MEDICAL CENTER | Age: 62
End: 2022-11-10
Payer: MEDICARE

## 2022-11-10 DIAGNOSIS — I48.91 ATRIAL FIBRILLATION, UNSPECIFIED TYPE (HCC): ICD-10-CM

## 2022-11-10 DIAGNOSIS — I48.92 ATRIAL FLUTTER, UNSPECIFIED TYPE (HCC): ICD-10-CM

## 2022-11-10 RX ORDER — METOPROLOL SUCCINATE 50 MG/1
50 TABLET, EXTENDED RELEASE ORAL 2 TIMES DAILY
Qty: 180 TABLET | Refills: 1 | Status: SHIPPED | OUTPATIENT
Start: 2022-11-10 | End: 2023-08-29

## 2022-11-10 NOTE — TELEPHONE ENCOUNTER
SS    Caller: Juan    Reported Symptoms: Pt called and stated he's been experiencing afib symptoms and would like to speak to a nurse.    Recent Blood Pressure/Heart Rate Reading: Doesn't have any reading, but stated he has a pace maker that is monitored by us to get those numbers.    Callback Number: [phone]

## 2022-11-10 NOTE — TELEPHONE ENCOUNTER
To SS - please advise on arrhythmia episode. Ok to take extra dose/half dose of metop? Back on oac?      Pt started feeling it right away.   Denies having any lightheadedness, dizziness, chest discomfort, or shortness of breath.    Should be drinking more water.     Denies anything outside of normal activities, no etoh, caffeine, stress, etc.     Pt himself back on xarelto he had some left over.     Last year, Pt went back into af for few days then back into NSR for over a year.     Pt has been our of mag for 3 weeks.     Pt taking 25mg BID metop

## 2022-11-10 NOTE — TELEPHONE ENCOUNTER
Patient transmitted via home monitor--patient in atrial flutter/atrial fib with RVR beginning 11/9 at 3:40 pm.  Ventricular rates 115-130 bpm.    Report to be scanned.

## 2022-11-10 NOTE — TELEPHONE ENCOUNTER
Alexi Faith M.D.  You 1 hour ago (2:57 PM)     Yeah let's double up on the metoprolol. Can restart Eliquis 5 BID for now.    Pt only ever been on xarelto. Pt restarted oac and will inc bb

## 2022-11-14 ENCOUNTER — TELEPHONE (OUTPATIENT)
Dept: CARDIOLOGY | Facility: MEDICAL CENTER | Age: 62
End: 2022-11-14
Payer: MEDICARE

## 2022-11-14 NOTE — TELEPHONE ENCOUNTER
SS    Caller: Juan    Reported Symptoms: Pt called and stated he's still experiencing some afib symptoms specifically his heart rate.    Heart Rate Readin-149 - heart rate      Callback Number: [phone]

## 2022-11-14 NOTE — TELEPHONE ENCOUNTER
Patient transmitted 11/13-- patient still in atrial flutter with RVR with ventricular rate @ 140-145 bpm.

## 2022-11-14 NOTE — TELEPHONE ENCOUNTER
Remote Transmission 11/13/2022:    Pt presenting in AFL w/RVR, episode began 11/13/2022.    Mode Switch @ 4%.  Longest episode 18 hours 52 mins and 53 secs.    Py on Xarelto.  Full report scanned into media.

## 2022-11-16 NOTE — TELEPHONE ENCOUNTER
Patient scheduled for flutter ablation w/ARACELI on 11-30-22 with Dr. Faith. Patient has been instructed to check in at 6:00 for 7:30 case time. Message sent to jenny Fong with [facility] notified. Emailed Lizet.

## 2022-11-16 NOTE — TELEPHONE ENCOUNTER
Patient scheduled for cardioversion w/anesthesia on 11-22-22 with Dr. Jones. Patient has been instructed to check in at 9:00 for 11:00 case time. Message sent to jenny Fong with [facility] notified. FYI to Dr. Jones.

## 2022-11-16 NOTE — TELEPHONE ENCOUNTER
Alexi Faith M.D.  You 1 hour ago (8:15 AM)     Yeah I think let's schedule cardioversion for him. If he amenable probably need additional ablation to take care of remaining flutter circuits (mitral circuit not addressed previously).    Pt is agreeable to both dccv and ablation.   Gave ED precautions for rates.     To SS any meds to hold for dccv and ablation?

## 2022-11-17 ENCOUNTER — HOSPITAL ENCOUNTER (OUTPATIENT)
Facility: MEDICAL CENTER | Age: 62
End: 2022-11-17
Attending: INTERNAL MEDICINE | Admitting: INTERNAL MEDICINE
Payer: MEDICARE

## 2022-11-18 ENCOUNTER — APPOINTMENT (OUTPATIENT)
Dept: RADIOLOGY | Facility: MEDICAL CENTER | Age: 62
End: 2022-11-18
Attending: EMERGENCY MEDICINE
Payer: MEDICARE

## 2022-11-18 ENCOUNTER — HOSPITAL ENCOUNTER (EMERGENCY)
Facility: MEDICAL CENTER | Age: 62
End: 2022-11-18
Attending: EMERGENCY MEDICINE
Payer: MEDICARE

## 2022-11-18 ENCOUNTER — APPOINTMENT (OUTPATIENT)
Dept: CARDIOLOGY | Facility: MEDICAL CENTER | Age: 62
End: 2022-11-18
Attending: INTERNAL MEDICINE
Payer: MEDICARE

## 2022-11-18 ENCOUNTER — PRE-ADMISSION TESTING (OUTPATIENT)
Dept: ADMISSIONS | Facility: MEDICAL CENTER | Age: 62
End: 2022-11-18
Attending: INTERNAL MEDICINE
Payer: MEDICARE

## 2022-11-18 VITALS
OXYGEN SATURATION: 94 % | HEART RATE: 71 BPM | SYSTOLIC BLOOD PRESSURE: 101 MMHG | DIASTOLIC BLOOD PRESSURE: 55 MMHG | TEMPERATURE: 99.1 F | RESPIRATION RATE: 18 BRPM

## 2022-11-18 VITALS — WEIGHT: 295.2 LBS | HEIGHT: 69 IN | BODY MASS INDEX: 43.72 KG/M2

## 2022-11-18 DIAGNOSIS — I48.91 ATRIAL FIBRILLATION WITH RAPID VENTRICULAR RESPONSE (HCC): ICD-10-CM

## 2022-11-18 DIAGNOSIS — I48.92 ATRIAL FLUTTER, UNSPECIFIED TYPE (HCC): ICD-10-CM

## 2022-11-18 DIAGNOSIS — Z01.812 PRE-OPERATIVE LABORATORY EXAMINATION: ICD-10-CM

## 2022-11-18 LAB
ANION GAP SERPL CALC-SCNC: 12 MMOL/L (ref 7–16)
APTT PPP: 39.7 SEC (ref 24.7–36)
BASOPHILS # BLD AUTO: 0.3 % (ref 0–1.8)
BASOPHILS # BLD: 0.03 K/UL (ref 0–0.12)
BUN SERPL-MCNC: 12 MG/DL (ref 8–22)
CALCIUM SERPL-MCNC: 9.1 MG/DL (ref 8.5–10.5)
CHLORIDE SERPL-SCNC: 98 MMOL/L (ref 96–112)
CO2 SERPL-SCNC: 26 MMOL/L (ref 20–33)
CREAT SERPL-MCNC: 0.95 MG/DL (ref 0.5–1.4)
EKG IMPRESSION: NORMAL
EKG IMPRESSION: NORMAL
EOSINOPHIL # BLD AUTO: 0.04 K/UL (ref 0–0.51)
EOSINOPHIL NFR BLD: 0.4 % (ref 0–6.9)
ERYTHROCYTE [DISTWIDTH] IN BLOOD BY AUTOMATED COUNT: 44.9 FL (ref 35.9–50)
GFR SERPLBLD CREATININE-BSD FMLA CKD-EPI: 89 ML/MIN/1.73 M 2
GLUCOSE SERPL-MCNC: 122 MG/DL (ref 65–99)
HCT VFR BLD AUTO: 47.2 % (ref 42–52)
HGB BLD-MCNC: 15.5 G/DL (ref 14–18)
IMM GRANULOCYTES # BLD AUTO: 0.03 K/UL (ref 0–0.11)
IMM GRANULOCYTES NFR BLD AUTO: 0.3 % (ref 0–0.9)
INR PPP: 1.63 (ref 0.87–1.13)
LYMPHOCYTES # BLD AUTO: 0.55 K/UL (ref 1–4.8)
LYMPHOCYTES NFR BLD: 5.9 % (ref 22–41)
MCH RBC QN AUTO: 30.3 PG (ref 27–33)
MCHC RBC AUTO-ENTMCNC: 32.8 G/DL (ref 33.7–35.3)
MCV RBC AUTO: 92.2 FL (ref 81.4–97.8)
MONOCYTES # BLD AUTO: 0.78 K/UL (ref 0–0.85)
MONOCYTES NFR BLD AUTO: 8.3 % (ref 0–13.4)
NEUTROPHILS # BLD AUTO: 7.95 K/UL (ref 1.82–7.42)
NEUTROPHILS NFR BLD: 84.8 % (ref 44–72)
NRBC # BLD AUTO: 0 K/UL
NRBC BLD-RTO: 0 /100 WBC
PLATELET # BLD AUTO: 204 K/UL (ref 164–446)
PMV BLD AUTO: 11.7 FL (ref 9–12.9)
POTASSIUM SERPL-SCNC: 4.3 MMOL/L (ref 3.6–5.5)
PROTHROMBIN TIME: 19 SEC (ref 12–14.6)
RBC # BLD AUTO: 5.12 M/UL (ref 4.7–6.1)
SODIUM SERPL-SCNC: 136 MMOL/L (ref 135–145)
TROPONIN T SERPL-MCNC: 9 NG/L (ref 6–19)
WBC # BLD AUTO: 9.4 K/UL (ref 4.8–10.8)

## 2022-11-18 PROCEDURE — 85025 COMPLETE CBC W/AUTO DIFF WBC: CPT

## 2022-11-18 PROCEDURE — 96374 THER/PROPH/DIAG INJ IV PUSH: CPT | Mod: XU

## 2022-11-18 PROCEDURE — 92960 CARDIOVERSION ELECTRIC EXT: CPT | Performed by: INTERNAL MEDICINE

## 2022-11-18 PROCEDURE — 71045 X-RAY EXAM CHEST 1 VIEW: CPT

## 2022-11-18 PROCEDURE — 84484 ASSAY OF TROPONIN QUANT: CPT

## 2022-11-18 PROCEDURE — 36620 INSERTION CATHETER ARTERY: CPT

## 2022-11-18 PROCEDURE — 36415 COLL VENOUS BLD VENIPUNCTURE: CPT

## 2022-11-18 PROCEDURE — 93312 ECHO TRANSESOPHAGEAL: CPT | Mod: 26 | Performed by: INTERNAL MEDICINE

## 2022-11-18 PROCEDURE — 93005 ELECTROCARDIOGRAM TRACING: CPT | Performed by: INTERNAL MEDICINE

## 2022-11-18 PROCEDURE — 85610 PROTHROMBIN TIME: CPT

## 2022-11-18 PROCEDURE — 99285 EMERGENCY DEPT VISIT HI MDM: CPT

## 2022-11-18 PROCEDURE — 85730 THROMBOPLASTIN TIME PARTIAL: CPT

## 2022-11-18 PROCEDURE — 93010 ELECTROCARDIOGRAM REPORT: CPT | Mod: 59 | Performed by: INTERNAL MEDICINE

## 2022-11-18 PROCEDURE — 93005 ELECTROCARDIOGRAM TRACING: CPT | Performed by: EMERGENCY MEDICINE

## 2022-11-18 PROCEDURE — 700111 HCHG RX REV CODE 636 W/ 250 OVERRIDE (IP): Performed by: EMERGENCY MEDICINE

## 2022-11-18 PROCEDURE — 80048 BASIC METABOLIC PNL TOTAL CA: CPT

## 2022-11-18 PROCEDURE — 93005 ELECTROCARDIOGRAM TRACING: CPT

## 2022-11-18 PROCEDURE — 93312 ECHO TRANSESOPHAGEAL: CPT

## 2022-11-18 PROCEDURE — 99285 EMERGENCY DEPT VISIT HI MDM: CPT | Mod: 25 | Performed by: INTERNAL MEDICINE

## 2022-11-18 RX ORDER — ONDANSETRON 2 MG/ML
4 INJECTION INTRAMUSCULAR; INTRAVENOUS ONCE
Status: DISCONTINUED | OUTPATIENT
Start: 2022-11-18 | End: 2022-11-18 | Stop reason: HOSPADM

## 2022-11-18 RX ORDER — PROPOFOL 10 MG/ML
200 INJECTION, EMULSION INTRAVENOUS ONCE
Status: COMPLETED | OUTPATIENT
Start: 2022-11-18 | End: 2022-11-18

## 2022-11-18 RX ADMIN — PROPOFOL 100 MG: 10 INJECTION, EMULSION INTRAVENOUS at 14:36

## 2022-11-18 ASSESSMENT — FIBROSIS 4 INDEX: FIB4 SCORE: 1.13

## 2022-11-18 NOTE — CONSULTS
"CARDIOLOGY CONSULT     PATIENT ID:  NAME:  Juan Martel  MRN:               [id number]  YOB: 1960    Mr. Martel is a 62-year-old male paroxsymal AF (on Xarelto; s/p cardioversion x4 and s/p PVI ablation), ARCHANA (on CPAP), cardiac asystole (requiring dual chamber permanent pacemaker placement in 2016), BMI 43, gout, and HC. Cardiology consulted 11/18/2022 recurrent atrial flutter requiring cardioversion.      Date of Admission: 11/18/2022     Attending: Bryce Preston MD    Resident: Lea Tolentino MD     Primary Care Physician:  ALEX Fisher    CC: Shortness of breath and palpitations    HPI: Juan Martel is a 62 y.o. male who presented with shortness of breath and palpitations.    Since 11/10/2022, the patient reported that his heart monitor declared that he was no longer in a regular rhythm.  As such, he started taking his prescribed Xarelto.  He updated his cardiologist, Dr. Faith, who suggested that he increase his metoprolol dosing from 25 mg twice daily to 50 mg twice daily.  The patient denied any improvement in the symptoms with the increase in dosing of the metoprolol.    Last night, the patient reported that his heart rate was \"rachell high all night.\"  He said that his heart monitor reported that he was in the 150s to 160s.  He reported poor sleep as result of waking up with an anxiety-like feeling.  He denied any chest pain.  He did report dizziness, however.    This morning, the patient was in preop for an upcoming scheduled cardioversion on 11/22/2 and ARACELI with ablation on 11/30/22.  Of note, the patient reported that his heart will remain in the arrhythmia until he undergoes ablation.  While in preop, the patient was experiencing sweatiness, nausea, and dizziness.    Since his symptoms began on 11/10/2022, he is only been able to walk a mile 3 times (decreased from baseline) and has not been able to continue his regular weightlifting exercises as result of feeling poorly. "  He denied tobacco use, recreational drug use, and alcohol use.    REVIEW OF SYSTEMS:   ROS negative unless stated in HPI.               PAST MEDICAL HISTORY:  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    Pneumonia 2000    Sleep apnea     CPAP       PAST SURGICAL HISTORY:  Past Surgical History:   Procedure Laterality Date    OTHER CARDIAC SURGERY  12/20/16    pacemaker    OTHER ORTHOPEDIC SURGERY  6/1/1990    achilles tendon repair left foot       FAMILY HISTORY:  Family History   Problem Relation Age of Onset    Heart Disease Mother         atrial fib    Stroke Father 65        CVa    Diabetes Father     Cancer Sister         skin    No Known Problems Brother     Stroke Paternal Grandmother        SOCIAL HISTORY:   Social History:   Tobacco: Denied  Alcohol: Denied  Recreational drugs (illegal and prescription): Denied    DIET:   No orders of the defined types were placed in this encounter.    ALLERGIES:  Allergies   Allergen Reactions    Bloodless     Codeine Vomiting    Tramadol Vomiting and Nausea       OUTPATIENT MEDICATIONS:  No current facility-administered medications for this encounter.    Current Outpatient Medications:     Potassium (POTASSIMIN PO), Take 2 Tablets by mouth every day. OTC strength, Disp: , Rfl:     BORON PO, Take 2 Tablets by mouth every day., Disp: , Rfl:     metoprolol SR (TOPROL XL) 50 MG TABLET SR 24 HR, Take 1 Tablet by mouth 2 times a day., Disp: 180 Tablet, Rfl: 1    rivaroxaban (XARELTO) 20 MG Tab tablet, Take 1 Tablet by mouth with dinner., Disp: 90 Tablet, Rfl: 1    allopurinol (ZYLOPRIM) 300 MG Tab, Take 1 Tablet by mouth every day. LAST refill. Patient needs labs and follow up with PCP for further refills., Disp: 90 Tablet, Rfl: 0     Magnesium 500 MG Tab, Take 500 mg by mouth every day., Disp: , Rfl:     PHYSICAL EXAM:  Vitals:    22 1003 22 1209   BP: (!) 151/100 126/68   Pulse: (!) 142 (!) 141   Resp: 16 18   Temp: 37.4 °C (99.4 °F)    TempSrc: Temporal    SpO2: 96% 94%   , Temp (24hrs), Av.4 °C (99.4 °F), Min:37.4 °C (99.4 °F), Max:37.4 °C (99.4 °F)  , Pulse Oximetry: 94 %, O2 (LPM): 0, O2 Delivery Device: None - Room Air    General: Pt resting in NAD, cooperative   Skin:  Pink, warm and dry.  No rashes  Lungs:  Symmetrical.  CTAB with no W/R/R.  Good air movement   Cardiovascular:  Tachycardic rate, regular rhythm. No murmurs.   Abdomen:  Abdomen is soft, nontender, nondistended. +BS. No masses noted.  Extremities: Trace edema on left.     LAB TESTS:   Recent Labs     22  1040   WBC 9.4   RBC 5.12   HEMOGLOBIN 15.5   HEMATOCRIT 47.2   MCV 92.2   MCH 30.3   RDW 44.9   PLATELETCT 204   MPV 11.7   NEUTSPOLYS 84.80*   LYMPHOCYTES 5.90*   MONOCYTES 8.30   EOSINOPHILS 0.40   BASOPHILS 0.30         Recent Labs     22  1040   SODIUM 136   POTASSIUM 4.3   CHLORIDE 98   CO2 26   BUN 12   CREATININE 0.95   CALCIUM 9.1       CULTURES:   Results       ** No results found for the last 168 hours. **            IMAGES:  DX-CHEST-PORTABLE (1 VIEW)   Final Result      1.  Cardiomegaly with interstitial prominence, likely edema.          ASSESSMENT/PLAN:     Mr. Martel is a 62-year-old male paroxsymal AF (on Xarelto; s/p cardioversion x4 and s/p PVI ablation), ARCHANA (on CPAP), cardiac asystole (requiring dual chamber permanent pacemaker placement in 2016), BMI 43, gout, and HC. Cardiology consulted 2022 recurrent atrial flutter requiring cardioversion.      #Atrial flutter  - Continue NPO  - Continue Xarelto pending cardioversion  - ARACELI prior to cardioversion   - Will attempt to move cardioversion up to today  - Cardiac ablation with Dr. Faith scheduled for 22    Lea Tolentino MD  PGY-2  [facility] on Cardiology  Service with Dr. Preston, please review Dr. Preston's note prior to updating orders.

## 2022-11-18 NOTE — ED NOTES
Med Rec completed per patient   Allergies reviewed  No ORAL antibiotics in last 30 days    Patient takes Metoprolol Succinate TWICE a day

## 2022-11-18 NOTE — PROGRESS NOTES
Brief Cardiology Note:    I was called to discuss this patients care with Dr. Templeton. We discussed 62 year old man with PMH SSS s/p PPM, AF s/p PVI ablation on xarelto for cva prevention called for recurrent Aflut. Am informed patient compliant without missed doses xarelto. Agree with cardioversion. Can further titrate metoprolol thereafter if vitals stable. Follow up with EP for pending ablation.    At this time it was deemed no formal in person cardiology consultation was necessary, however if this changes due to changes in patient condition or abnormal test results, please re-consult cardiology.     Electronically Signed by:  Bryce Preston MD  11/18/2022  11:53 AM

## 2022-11-18 NOTE — PROCEDURES
Electrical Cardioversion    Date/Time: 11/18/2022 2:42 PM  Performed by: Bryce Preston M.D.  Authorized by: Bryce Preston M.D.     Consent:     Consent obtained:  Verbal and written    Consent given by:  Patient    Risks discussed:  Cutaneous burn, death, induced arrhythmia and pain    Alternatives discussed:  No treatment, rate-control medication, anti-coagulation medication, alternative treatment, observation, delayed treatment and referral  Sedation:     Patient sedated: Yes    Pre-procedure details:     Cardioversion basis:  Elective    Rhythm:  Atrial flutter    Electrode placement:  Anterior-posterior    Anticoagulation status:  Rivaroxaban  Attempt one:     Cardioversion mode:  Synchronous    Waveform:  Biphasic    Shock (Joules):  200    Shock outcome:  Conversion to normal sinus rhythm  Post-procedure details:     Patient status:  Awake    Patient tolerance of procedure:  Tolerated well, no immediate complications  Comments:      Propofol sedation per Dr Barger

## 2022-11-18 NOTE — ED NOTES
Pt arrived to red 11. Placed on monitor and o2 with end tidal. Awaiting cardiology for cardioversion, consent obtained and on chart.

## 2022-11-18 NOTE — OR NURSING
During PAT appointment today, patient c/o feeling, flushed, SOB and 'not feeling well'.  Denies CP.  Charge nurse transferred patient to [facility] ED via w/c for evaluation.      This RN left a voice mail for Chelsea to call ext. [phone] to share above information and to inform that the patient is bloodless.

## 2022-11-18 NOTE — ED NOTES
Discharged home with s/o. Pt understands to follow up as scheduled with cariology. Understands change to medications all questions answered.

## 2022-11-18 NOTE — ED NOTES
Pt awake, semi reclined in bed, speaking without signs of distress. Denies needs at this time. States last food or fluid intake last pm prior to midnight.

## 2022-11-18 NOTE — ED PROVIDER NOTES
ED Provider Note    CHIEF COMPLAINT  No chief complaint on file.      HPI  Juan Martel is a 62 y.o. male with a history of atrial fibrillation who presents with a chief complaint of shortness of breath and palpitations.  Patient states that he felt himself go into atrial fibrillation 8 days ago.  He contacted his cardiologist, Dr. Faith, who increased his metoprolol from 25 mg twice daily to 50 mg twice daily.  He has been taking the medication as directed but his heart rate has been persistently in the 140s to 150s.  He has associated shortness of breath, dizziness, and nausea but no vomiting or chest pain.  He denies any leg swelling.  He is scheduled for a cardioversion in 4 days and an ablation 1 week following the cardioversion.  He was seen for his preoperative appointment today and was sent to the ER because of his tachycardia.  He is anticoagulated on Xarelto and is completely compliant.    REVIEW OF SYSTEMS  See HPI for further details.  Palpitations.  Shortness of breath.  All other systems are negative.     PAST MEDICAL HISTORY   has a past medical history of A-fib (HCC), At risk for sleep apnea, Atrial fibrillation (HCC), Cardiac asystole (HCC) (12/20/2016), Cardiac pacemaker in situ (12/20/2016), Gout, Idiopathic chronic gout of right foot without tophus (03/13/2019), Pneumonia (2000), and Sleep apnea.    SOCIAL HISTORY  Social History     Tobacco Use    Smoking status: Never    Smokeless tobacco: Never   Vaping Use    Vaping Use: Never used   Substance and Sexual Activity    Alcohol use: Not Currently    Drug use: No    Sexual activity: Not on file       SURGICAL HISTORY   has a past surgical history that includes other orthopedic surgery (6/1/1990) and other cardiac surgery (12/20/16).    CURRENT MEDICATIONS  Home Medications    **Home medications have not yet been reviewed for this encounter**         ALLERGIES  Allergies   Allergen Reactions    Bloodless     Codeine Vomiting    Tramadol Vomiting  and Nausea       PHYSICAL EXAM  VITAL SIGNS: BP (!) 151/100   Pulse (!) 142   Temp 37.4 °C (99.4 °F) (Temporal)   Resp 16   SpO2 96%    Pulse ox interpretation: I interpret this pulse ox as normal.  Constitutional: Alert in no apparent distress.  HENT: No signs of trauma, Bilateral external ears normal, Nose normal.  Moist mucous membranes.  Eyes: Pupils are equal and reactive, Conjunctiva normal, Non-icteric.   Neck: Normal range of motion, No tenderness, Supple, No stridor.   Lymphatic: No lymphadenopathy noted.   Cardiovascular: Tachycardic with regular rhythm, no murmurs. Pulses symmetrical.  Thorax & Lungs: Normal breath sounds, No respiratory distress, No wheezing.   Abdomen: Bowel sounds normal, Soft, No tenderness, No masses, No pulsatile masses. No peritoneal signs.  Skin: Warm, Dry, No erythema, No rash.   Back: Normal alignment.   Extremities: Intact distal pulses, No edema, No tenderness, No cyanosis.  Musculoskeletal: No major deformities noted.   Neurologic: Alert, No focal deficits noted.   Psychiatric: Affect normal, Judgment normal, Mood normal.     DIAGNOSTIC STUDIES / PROCEDURES    LABS  Results for orders placed or performed during the hospital encounter of 11/18/22   CBC WITH DIFFERENTIAL   Result Value Ref Range    WBC 9.4 4.8 - 10.8 K/uL    RBC 5.12 4.70 - 6.10 M/uL    Hemoglobin 15.5 14.0 - 18.0 g/dL    Hematocrit 47.2 42.0 - 52.0 %    MCV 92.2 81.4 - 97.8 fL    MCH 30.3 27.0 - 33.0 pg    MCHC 32.8 (L) 33.7 - 35.3 g/dL    RDW 44.9 35.9 - 50.0 fL    Platelet Count 204 164 - 446 K/uL    MPV 11.7 9.0 - 12.9 fL    Neutrophils-Polys 84.80 (H) 44.00 - 72.00 %    Lymphocytes 5.90 (L) 22.00 - 41.00 %    Monocytes 8.30 0.00 - 13.40 %    Eosinophils 0.40 0.00 - 6.90 %    Basophils 0.30 0.00 - 1.80 %    Immature Granulocytes 0.30 0.00 - 0.90 %    Nucleated RBC 0.00 /100 WBC    Neutrophils (Absolute) 7.95 (H) 1.82 - 7.42 K/uL    Lymphs (Absolute) 0.55 (L) 1.00 - 4.80 K/uL    Monos (Absolute) 0.78  0.00 - 0.85 K/uL    Eos (Absolute) 0.04 0.00 - 0.51 K/uL    Baso (Absolute) 0.03 0.00 - 0.12 K/uL    Immature Granulocytes (abs) 0.03 0.00 - 0.11 K/uL    NRBC (Absolute) 0.00 K/uL   Basic Metabolic Panel   Result Value Ref Range    Sodium 136 135 - 145 mmol/L    Potassium 4.3 3.6 - 5.5 mmol/L    Chloride 98 96 - 112 mmol/L    Co2 26 20 - 33 mmol/L    Glucose 122 (H) 65 - 99 mg/dL    Bun 12 8 - 22 mg/dL    Creatinine 0.95 0.50 - 1.40 mg/dL    Calcium 9.1 8.5 - 10.5 mg/dL    Anion Gap 12.0 7.0 - 16.0   TROPONIN   Result Value Ref Range    Troponin T 9 6 - 19 ng/L   PT/INR   Result Value Ref Range    PT 19.0 (H) 12.0 - 14.6 sec    INR 1.63 (H) 0.87 - 1.13   PTT   Result Value Ref Range    APTT 39.7 (H) 24.7 - 36.0 sec   ESTIMATED GFR   Result Value Ref Range    GFR (CKD-EPI) 89 >60 mL/min/1.73 m 2   EKG (NOW)   Result Value Ref Range    Report       [facility] Emergency Dept.    Test Date:  2022  Pt Name:    CINTHIA JHA    Department: ER  MRN:        [id number]                      Room:  Gender:     Male                         Technician: [id number]  :        1960                   Requested By:ER TRIAGE PROTOCOL  Order #:    [id number]                    Reading MD: Ronnie Templeton MD    Measurements  Intervals                                Axis  Rate:       141                          P:  NJ:                                      QRS:        -62  QRSD:       112                          T:          76  QT:         336  QTc:        515    Interpretive Statements  Atrial flutter with predominant 2:1 AV block  Left anterior fascicular block  Abnormal R-wave progression, late transition  Prolonged QT interval  Compared to ECG 2022 14:22:13  2:1 AV block now present  Prolonged QT interval now present  Sinus rhythm no longer present  Electronically Signed On 2022 10:59:10 PST by Kaylah Templeton MD       RADIOLOGY  DX-CHEST-PORTABLE (1 VIEW)   Final Result      1.   Cardiomegaly with interstitial prominence, likely edema.        COURSE & MEDICAL DECISION MAKING  Pertinent Labs & Imaging studies reviewed. (See chart for details)  This is a 62-year-old male with a history of atrial fibrillation and scheduled ARACELI cardioversion in 4 days who was sent here from his preoperative appointment after being found to be tachycardic.  He is in atrial flutter with RVR with his heart rate in the 140s but blood pressure is in the 140s systolic.  He is warm and well perfused, alert and oriented, currently stable.  Labs are reassuring without leukocytosis or anemia.  Metabolic panel normal with the exception of blood glucose to 122.  Troponin is normal.  Chest x-ray shows interstitial prominence likely edema but patient's O2 sats are in the mid to high 90s on room air, he has no history of heart failure, he has no rales, and no lower extremity edema.    Initially discussed with cardiology and patient was felt to be a good candidate for emergency department cardioversion due to his compliance with Xarelto.  However, after reevaluating the patient he states that he has only been taking Xarelto for the past 8 days.  He is not currently anticoagulated appropriately for an ED cardioversion and will require ARACELI cardioversion.  This was communicated to the on-call cardiologist, Dr. Preston.  Plan for ER ARACELI cardioversion.  Patient care turned over to my partner, Dr. Barger, who performed the conscious sedation and subsequently discharged the patient in consultation with cardiologist.    HYDRATION  HYDRATION: Based on the patient's presentation of Tachycardia the patient was given IV fluids. IV Hydration was used because oral hydration was not adequate alone. Upon recheck following hydration, the patient was unchanged.    FINAL IMPRESSION  1.  A. fib/a flutter with RVR    Electronically signed by: Ronnie Templeton M.D., 11/18/2022 10:45 AM

## 2022-11-19 NOTE — CONSULTS
Reason for Consult:  Asked by Dr Wallace att. providers found to see this patient with atrial flutter    CC: palpitations    HPI:      62 year old man with PMH SSS s/p PPM, AF s/p PVI ablation on xarelto for cva prevention called for recurrent Aflut. Highly symptomatic to arrhythmia. Compliant with medications. Compliant with medical follow up. Compliant with cpap. Pending ablation with EP. Denies toxic social habits.     Medications / Drug list prior to admission:  No current facility-administered medications on file prior to encounter.     Current Outpatient Medications on File Prior to Encounter   Medication Sig Dispense Refill    Potassium (POTASSIMIN PO) Take 2 Tablets by mouth every day. OTC strength      BORON PO Take 2 Tablets by mouth every day.      metoprolol SR (TOPROL XL) 50 MG TABLET SR 24 HR Take 1 Tablet by mouth 2 times a day. 180 Tablet 1    rivaroxaban (XARELTO) 20 MG Tab tablet Take 1 Tablet by mouth with dinner. 90 Tablet 1    allopurinol (ZYLOPRIM) 300 MG Tab Take 1 Tablet by mouth every day. LAST refill. Patient needs labs and follow up with PCP for further refills. 90 Tablet 0    Magnesium 500 MG Tab Take 500 mg by mouth every day.         Current list of administered Medications:  No current facility-administered medications for this encounter.    Current Outpatient Medications:     Potassium (POTASSIMIN PO), Take 2 Tablets by mouth every day. OTC strength, Disp: , Rfl:     BORON PO, Take 2 Tablets by mouth every day., Disp: , Rfl:     metoprolol SR (TOPROL XL) 50 MG TABLET SR 24 HR, Take 1 Tablet by mouth 2 times a day., Disp: 180 Tablet, Rfl: 1    rivaroxaban (XARELTO) 20 MG Tab tablet, Take 1 Tablet by mouth with dinner., Disp: 90 Tablet, Rfl: 1    allopurinol (ZYLOPRIM) 300 MG Tab, Take 1 Tablet by mouth every day. LAST refill. 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    Pneumonia 2000    Sleep apnea     CPAP       Past Surgical History:   Procedure Laterality Date    OTHER CARDIAC SURGERY  12/20/16    pacemaker    OTHER ORTHOPEDIC SURGERY  6/1/1990    achilles tendon repair left foot       Family History   Problem Relation Age of Onset    Heart Disease Mother         atrial fib    Stroke Father 65        CVa    Diabetes Father     Cancer Sister         skin    No Known Problems Brother     Stroke Paternal Grandmother      Patient family history was personally reviewed, no pertinent family history to current presentation    Social History     Socioeconomic History    Marital status:      Spouse name: Not on file    Number of children: Not on file    Years of education: Not on file    Highest education level: Not on file   Occupational History    Not on file   Tobacco Use    Smoking status: Never    Smokeless tobacco: Never   Vaping Use    Vaping Use: Never used   Substance and Sexual Activity    Alcohol use: Not Currently    Drug use: No    Sexual activity: Not on file   Other Topics Concern    Not on file   Social History Narrative    Not on file     Social Determinants of Health     Financial Resource Strain: Not on file   Food Insecurity: Not on file   Transportation Needs: Not on file   Physical Activity: Not on file   Stress: Not on file   Social Connections: Not on file   Intimate Partner Violence: Not on file   Housing Stability: Not on file       ALLERGIES:  Allergies   Allergen Reactions    Bloodless     Codeine Vomiting    Tramadol Vomiting and Nausea       Review of systems:  A complete review of symptoms was reviewed with patient. This is reviewed in H&P and PMH. ALL OTHERS reviewed  and negative    Physical exam:  Patient Vitals for the past 24 hrs:   BP Temp Temp src Pulse Resp SpO2   22 1500 101/55 37.3 °C (99.1 °F) Temporal 71 18 94 %   22 1447 127/72 -- -- 67 (!) 24 95 %   22 1441 118/61 -- -- 61 20 96 %   22 1436 120/79 -- -- (!) 141 (!) 22 96 %   22 1300 129/77 -- -- (!) 141 20 97 %   22 1209 126/68 -- -- (!) 141 18 94 %   22 1003 (!) 151/100 37.4 °C (99.4 °F) Temporal (!) 142 16 96 %     General: No acute distress.   EYES: no jaundice  HEENT: OP clear   Neck: No bruits No JVD.   CVS:  irreg. S1 + S2. No M/R/G. No edema.  Resp: CTAB. No wheezing or crackles/rhonchi.  Abdomen: Soft, NT, ND,  Skin: Grossly nothing acute no obvious rashes  Neurological: Alert, Moves all extremities, no cranial nerve defects on limited exam  Extremities: Pulse 2+ in b/l LE. No cyanosis.     Data:  Laboratory studies personally reviewed by me:  Recent Results (from the past 24 hour(s))   EKG (NOW)    Collection Time: 22 10:18 AM   Result Value Ref Range    Report       [facility] Emergency Dept.    Test Date:  2022  Pt Name:    CINTHIA JHA    Department: ER  MRN:        [id number]                      Room:  Gender:     Male                         Technician: [id number]  :        1960                   Requested By:ER TRIAGE PROTOCOL  Order #:    [id number]                    Reading MD: Ronnie Templeton MD    Measurements  Intervals                                Axis  Rate:       141                          P:  KY:                                      QRS:        -62  QRSD:       112                          T:          76  QT:         336  QTc:        515    Interpretive Statements  Atrial flutter with predominant 2:1 AV block  Left anterior fascicular block  Abnormal R-wave progression, late transition  Prolonged QT interval  Compared to ECG 2022 14:22:13  2:1 AV block now present  Prolonged QT interval now  present  Sinus rhythm no longer present  Electronically Signed On 11- 10:59:10 PST by Kaylah Templeton MD     CBC WITH DIFFERENTIAL    Collection Time: 11/18/22 10:40 AM   Result Value Ref Range    WBC 9.4 4.8 - 10.8 K/uL    RBC 5.12 4.70 - 6.10 M/uL    Hemoglobin 15.5 14.0 - 18.0 g/dL    Hematocrit 47.2 42.0 - 52.0 %    MCV 92.2 81.4 - 97.8 fL    MCH 30.3 27.0 - 33.0 pg    MCHC 32.8 (L) 33.7 - 35.3 g/dL    RDW 44.9 35.9 - 50.0 fL    Platelet Count 204 164 - 446 K/uL    MPV 11.7 9.0 - 12.9 fL    Neutrophils-Polys 84.80 (H) 44.00 - 72.00 %    Lymphocytes 5.90 (L) 22.00 - 41.00 %    Monocytes 8.30 0.00 - 13.40 %    Eosinophils 0.40 0.00 - 6.90 %    Basophils 0.30 0.00 - 1.80 %    Immature Granulocytes 0.30 0.00 - 0.90 %    Nucleated RBC 0.00 /100 WBC    Neutrophils (Absolute) 7.95 (H) 1.82 - 7.42 K/uL    Lymphs (Absolute) 0.55 (L) 1.00 - 4.80 K/uL    Monos (Absolute) 0.78 0.00 - 0.85 K/uL    Eos (Absolute) 0.04 0.00 - 0.51 K/uL    Baso (Absolute) 0.03 0.00 - 0.12 K/uL    Immature Granulocytes (abs) 0.03 0.00 - 0.11 K/uL    NRBC (Absolute) 0.00 K/uL   Basic Metabolic Panel    Collection Time: 11/18/22 10:40 AM   Result Value Ref Range    Sodium 136 135 - 145 mmol/L    Potassium 4.3 3.6 - 5.5 mmol/L    Chloride 98 96 - 112 mmol/L    Co2 26 20 - 33 mmol/L    Glucose 122 (H) 65 - 99 mg/dL    Bun 12 8 - 22 mg/dL    Creatinine 0.95 0.50 - 1.40 mg/dL    Calcium 9.1 8.5 - 10.5 mg/dL    Anion Gap 12.0 7.0 - 16.0   TROPONIN    Collection Time: 11/18/22 10:40 AM   Result Value Ref Range    Troponin T 9 6 - 19 ng/L   PT/INR    Collection Time: 11/18/22 10:40 AM   Result Value Ref Range    PT 19.0 (H) 12.0 - 14.6 sec    INR 1.63 (H) 0.87 - 1.13   PTT    Collection Time: 11/18/22 10:40 AM   Result Value Ref Range    APTT 39.7 (H) 24.7 - 36.0 sec   ESTIMATED GFR    Collection Time: 11/18/22 10:40 AM   Result Value Ref Range    GFR (CKD-EPI) 89 >60 mL/min/1.73 m 2   EKG    Collection Time: 11/18/22  3:09 PM   Result Value Ref  Range    Report       [facility] Emergency Dept.    Test Date:  2022  Pt Name:    CINTHIA JHA    Department: ER  MRN:        [id number]                      Room:       RD 11  Gender:     Male                         Technician: [id number]  :        1960                   Requested By:BRYCE PRESTON  Order #:    [id number]                    Reading MD:    Measurements  Intervals                                Axis  Rate:       75                           P:          23  OR:         171                          QRS:        -81  QRSD:       103                          T:          -21  QT:         385  QTc:        430    Interpretive Statements  Sinus rhythm  Incomplete RBBB and LAFB  Abnormal R-wave progression, late transition  Borderline T wave abnormalities  Compared to ECG 2022 10:18:49  Incomplete right bundle-branch block now present  Right bundle-branch block now present  T-wave abnormality now present  Atrial flutter no longer present  2:1 AV block n o longer present  Prolonged QT interval no longer present         EKG 22 atrial flutter, lafb    All pertinent features of laboratory and imaging reviewed including primary images where applicable      Active Problems:    * No active hospital problems. *  Resolved Problems:    * No resolved hospital problems. *      Assessment / Plan:  62 year old man with PMH SSS s/p PPM, AF s/p PVI ablation on xarelto for cva prevention called for recurrent Aflut.     -one week doac, check ARACELI r/o JESSICA thrombus  -DCCV  -pending ablation with EP  -titrate metoprolol succinate  -continue xarelto    I personally discussed his case with Dr Templeton    It is my pleasure to participate in the care of Mr. Jha.  Please do not hesitate to contact me with questions or concerns.    Bryce Preston MD  Cardiologist [facility]

## 2022-11-21 NOTE — TELEPHONE ENCOUNTER
Recvd call from patient - he was cardioverted in the ER over the weekend. Cancelled cardioversion scheduled for 11-22-22 with Ej in cath lab scheduling and Ajit with Fam.

## 2022-11-22 ENCOUNTER — APPOINTMENT (OUTPATIENT)
Dept: CARDIOLOGY | Facility: MEDICAL CENTER | Age: 62
End: 2022-11-22
Attending: INTERNAL MEDICINE
Payer: MEDICARE

## 2022-11-27 NOTE — ED PROVIDER NOTES
ED Provider Note    Prior ERP asked me to perform conscious sedation for cardioversion per cardiology.  I performed those procedures, the patient was awake and alert, will be discharged home, will increase his metoprolol, follow-up with cardiology as an outpatient.    Conscious Sedation Procedure Note    Indication: cardioversion    Consent: I have discussed with the patient and/or the patient representative the indication, alternatives, and the possible risks and/or complications of the planned procedure and the anesthesia methods. The patient and/or patient representative appear to understand and agree to proceed.    Physician Involvement: The attending physician was present and supervising this procedure.    Pre-Sedation Documentation and Exam: I have personally completed a history, physical exam & review of systems for this patient (see notes).  Airway Assessment: Mallampati Class II - (soft palate, fauces & uvula are visible)  f3  Prior History of Anesthesia Complications: none    ASA Classification: Class 2 - A normal healthy patient with mild systemic disease    Sedation/ Anesthesia Plan: intravenous sedation    Medications Used: propofol intravenously    Monitoring and Safety: The patient was placed on a cardiac monitor and vital signs, pulse oximetry and level of consciousness were continuously evaluated throughout the procedure. The patient was closely monitored until recovery from the medications was complete and the patient had returned to baseline status. Respiratory therapy was on standby at all times during the procedure.      (The following sections must be completed)  Post-Sedation Vital Signs: Vital signs were reviewed and were stable after the procedure (see flow sheet for vitals)            Intraservice Time: Greater than 10 minutes    Post-Sedation Exam: Cardiovascular: regular rate and rhythm           Complications: none    I provided both the sedation and procedure, a nurse was present at the  bedside for the entire procedure.

## 2022-11-28 ENCOUNTER — PRE-ADMISSION TESTING (OUTPATIENT)
Dept: ADMISSIONS | Facility: MEDICAL CENTER | Age: 62
End: 2022-11-28
Attending: INTERNAL MEDICINE
Payer: MEDICARE

## 2022-11-28 DIAGNOSIS — Z01.812 PRE-OPERATIVE LABORATORY EXAMINATION: ICD-10-CM

## 2022-11-28 DIAGNOSIS — Z01.810 PRE-OPERATIVE CARDIOVASCULAR EXAMINATION: ICD-10-CM

## 2022-11-28 LAB
ALBUMIN SERPL BCP-MCNC: 4.2 G/DL (ref 3.2–4.9)
ALBUMIN/GLOB SERPL: 1.4 G/DL
ALP SERPL-CCNC: 81 U/L (ref 30–99)
ALT SERPL-CCNC: 51 U/L (ref 2–50)
ANION GAP SERPL CALC-SCNC: 11 MMOL/L (ref 7–16)
AST SERPL-CCNC: 25 U/L (ref 12–45)
BASOPHILS # BLD AUTO: 0.5 % (ref 0–1.8)
BASOPHILS # BLD: 0.03 K/UL (ref 0–0.12)
BILIRUB SERPL-MCNC: 0.6 MG/DL (ref 0.1–1.5)
BUN SERPL-MCNC: 13 MG/DL (ref 8–22)
CALCIUM SERPL-MCNC: 9.2 MG/DL (ref 8.5–10.5)
CHLORIDE SERPL-SCNC: 104 MMOL/L (ref 96–112)
CO2 SERPL-SCNC: 23 MMOL/L (ref 20–33)
CREAT SERPL-MCNC: 0.92 MG/DL (ref 0.5–1.4)
EKG IMPRESSION: NORMAL
EOSINOPHIL # BLD AUTO: 0.1 K/UL (ref 0–0.51)
EOSINOPHIL NFR BLD: 1.6 % (ref 0–6.9)
ERYTHROCYTE [DISTWIDTH] IN BLOOD BY AUTOMATED COUNT: 42.2 FL (ref 35.9–50)
GFR SERPLBLD CREATININE-BSD FMLA CKD-EPI: 92 ML/MIN/1.73 M 2
GLOBULIN SER CALC-MCNC: 3 G/DL (ref 1.9–3.5)
GLUCOSE SERPL-MCNC: 95 MG/DL (ref 65–99)
HCT VFR BLD AUTO: 47.4 % (ref 42–52)
HGB BLD-MCNC: 15.9 G/DL (ref 14–18)
IMM GRANULOCYTES # BLD AUTO: 0.03 K/UL (ref 0–0.11)
IMM GRANULOCYTES NFR BLD AUTO: 0.5 % (ref 0–0.9)
INR PPP: 1.78 (ref 0.87–1.13)
LYMPHOCYTES # BLD AUTO: 1.64 K/UL (ref 1–4.8)
LYMPHOCYTES NFR BLD: 26.4 % (ref 22–41)
MCH RBC QN AUTO: 30.4 PG (ref 27–33)
MCHC RBC AUTO-ENTMCNC: 33.5 G/DL (ref 33.7–35.3)
MCV RBC AUTO: 90.6 FL (ref 81.4–97.8)
MONOCYTES # BLD AUTO: 0.46 K/UL (ref 0–0.85)
MONOCYTES NFR BLD AUTO: 7.4 % (ref 0–13.4)
NEUTROPHILS # BLD AUTO: 3.95 K/UL (ref 1.82–7.42)
NEUTROPHILS NFR BLD: 63.6 % (ref 44–72)
NRBC # BLD AUTO: 0 K/UL
NRBC BLD-RTO: 0 /100 WBC
PLATELET # BLD AUTO: 221 K/UL (ref 164–446)
PMV BLD AUTO: 10.8 FL (ref 9–12.9)
POTASSIUM SERPL-SCNC: 4.2 MMOL/L (ref 3.6–5.5)
PROT SERPL-MCNC: 7.2 G/DL (ref 6–8.2)
PROTHROMBIN TIME: 20.3 SEC (ref 12–14.6)
RBC # BLD AUTO: 5.23 M/UL (ref 4.7–6.1)
SODIUM SERPL-SCNC: 138 MMOL/L (ref 135–145)
WBC # BLD AUTO: 6.2 K/UL (ref 4.8–10.8)

## 2022-11-28 PROCEDURE — 93005 ELECTROCARDIOGRAM TRACING: CPT

## 2022-11-28 PROCEDURE — 85025 COMPLETE CBC W/AUTO DIFF WBC: CPT

## 2022-11-28 PROCEDURE — 80053 COMPREHEN METABOLIC PANEL: CPT

## 2022-11-28 PROCEDURE — 36415 COLL VENOUS BLD VENIPUNCTURE: CPT

## 2022-11-28 PROCEDURE — 93010 ELECTROCARDIOGRAM REPORT: CPT | Performed by: INTERNAL MEDICINE

## 2022-11-28 PROCEDURE — 85610 PROTHROMBIN TIME: CPT

## 2022-11-29 ENCOUNTER — ANESTHESIA EVENT (OUTPATIENT)
Dept: CARDIOLOGY | Facility: MEDICAL CENTER | Age: 62
End: 2022-11-29
Payer: MEDICARE

## 2022-11-30 ENCOUNTER — APPOINTMENT (OUTPATIENT)
Dept: CARDIOLOGY | Facility: MEDICAL CENTER | Age: 62
End: 2022-11-30
Attending: INTERNAL MEDICINE
Payer: MEDICARE

## 2022-11-30 ENCOUNTER — HOSPITAL ENCOUNTER (OUTPATIENT)
Facility: MEDICAL CENTER | Age: 62
End: 2022-11-30
Attending: INTERNAL MEDICINE | Admitting: INTERNAL MEDICINE
Payer: MEDICARE

## 2022-11-30 ENCOUNTER — ANESTHESIA (OUTPATIENT)
Dept: CARDIOLOGY | Facility: MEDICAL CENTER | Age: 62
End: 2022-11-30
Payer: MEDICARE

## 2022-11-30 VITALS
RESPIRATION RATE: 18 BRPM | HEIGHT: 69 IN | BODY MASS INDEX: 42.25 KG/M2 | WEIGHT: 285.27 LBS | DIASTOLIC BLOOD PRESSURE: 68 MMHG | HEART RATE: 61 BPM | TEMPERATURE: 96.5 F | SYSTOLIC BLOOD PRESSURE: 106 MMHG | OXYGEN SATURATION: 92 %

## 2022-11-30 DIAGNOSIS — I48.91 ATRIAL FIBRILLATION, UNSPECIFIED TYPE (HCC): ICD-10-CM

## 2022-11-30 DIAGNOSIS — I48.92 ATRIAL FLUTTER, UNSPECIFIED TYPE (HCC): ICD-10-CM

## 2022-11-30 LAB
ACT BLD: 281 SEC (ref 74–137)
ACT BLD: 311 SEC (ref 74–137)
ACT BLD: 396 SEC (ref 74–137)
EKG IMPRESSION: NORMAL
INR PPP: 1.66 (ref 0.87–1.13)
PROTHROMBIN TIME: 19.3 SEC (ref 12–14.6)

## 2022-11-30 PROCEDURE — 700102 HCHG RX REV CODE 250 W/ 637 OVERRIDE(OP): Performed by: ANESTHESIOLOGY

## 2022-11-30 PROCEDURE — 700111 HCHG RX REV CODE 636 W/ 250 OVERRIDE (IP): Performed by: ANESTHESIOLOGY

## 2022-11-30 PROCEDURE — 160046 HCHG PACU - 1ST 60 MINS PHASE II

## 2022-11-30 PROCEDURE — 93623 PRGRMD STIMJ&PACG IV RX NFS: CPT | Mod: 26 | Performed by: INTERNAL MEDICINE

## 2022-11-30 PROCEDURE — 93325 DOPPLER ECHO COLOR FLOW MAPG: CPT | Mod: 26 | Performed by: ANESTHESIOLOGY

## 2022-11-30 PROCEDURE — 700101 HCHG RX REV CODE 250: Performed by: ANESTHESIOLOGY

## 2022-11-30 PROCEDURE — 93655 ICAR CATH ABLTJ DSCRT ARRHYT: CPT | Performed by: INTERNAL MEDICINE

## 2022-11-30 PROCEDURE — 93623 PRGRMD STIMJ&PACG IV RX NFS: CPT

## 2022-11-30 PROCEDURE — 160035 HCHG PACU - 1ST 60 MINS PHASE I

## 2022-11-30 PROCEDURE — 93286 PERI-PX EVAL PM/LDLS PM IP: CPT | Mod: 26 | Performed by: INTERNAL MEDICINE

## 2022-11-30 PROCEDURE — 93312 ECHO TRANSESOPHAGEAL: CPT

## 2022-11-30 PROCEDURE — 700101 HCHG RX REV CODE 250

## 2022-11-30 PROCEDURE — 00537 ANES CARDIAC EP PROCEDURES: CPT | Performed by: ANESTHESIOLOGY

## 2022-11-30 PROCEDURE — 93653 COMPRE EP EVAL TX SVT: CPT | Performed by: INTERNAL MEDICINE

## 2022-11-30 PROCEDURE — 93321 DOPPLER ECHO F-UP/LMTD STD: CPT | Mod: 26 | Performed by: ANESTHESIOLOGY

## 2022-11-30 PROCEDURE — A9270 NON-COVERED ITEM OR SERVICE: HCPCS | Performed by: ANESTHESIOLOGY

## 2022-11-30 PROCEDURE — 85610 PROTHROMBIN TIME: CPT

## 2022-11-30 PROCEDURE — 700105 HCHG RX REV CODE 258: Performed by: INTERNAL MEDICINE

## 2022-11-30 PROCEDURE — 93010 ELECTROCARDIOGRAM REPORT: CPT | Mod: 59 | Performed by: INTERNAL MEDICINE

## 2022-11-30 PROCEDURE — 700111 HCHG RX REV CODE 636 W/ 250 OVERRIDE (IP)

## 2022-11-30 PROCEDURE — 85347 COAGULATION TIME ACTIVATED: CPT | Mod: 91

## 2022-11-30 PROCEDURE — 160002 HCHG RECOVERY MINUTES (STAT)

## 2022-11-30 PROCEDURE — 93312 ECHO TRANSESOPHAGEAL: CPT | Mod: 26,59 | Performed by: ANESTHESIOLOGY

## 2022-11-30 PROCEDURE — 93462 L HRT CATH TRNSPTL PUNCTURE: CPT | Performed by: INTERNAL MEDICINE

## 2022-11-30 PROCEDURE — 93662 INTRACARDIAC ECG (ICE): CPT | Mod: 26 | Performed by: INTERNAL MEDICINE

## 2022-11-30 PROCEDURE — 93005 ELECTROCARDIOGRAM TRACING: CPT | Performed by: INTERNAL MEDICINE

## 2022-11-30 RX ORDER — HEPARIN SODIUM 200 [USP'U]/100ML
INJECTION, SOLUTION INTRAVENOUS
Status: COMPLETED
Start: 2022-11-30 | End: 2022-11-30

## 2022-11-30 RX ORDER — OMEPRAZOLE 20 MG/1
20 CAPSULE, DELAYED RELEASE ORAL DAILY
Qty: 30 CAPSULE | Refills: 0 | Status: SHIPPED | OUTPATIENT
Start: 2022-11-30 | End: 2023-01-12

## 2022-11-30 RX ORDER — OXYCODONE HCL 5 MG/5 ML
10 SOLUTION, ORAL ORAL
Status: DISCONTINUED | OUTPATIENT
Start: 2022-11-30 | End: 2022-11-30 | Stop reason: HOSPADM

## 2022-11-30 RX ORDER — ONDANSETRON 2 MG/ML
4 INJECTION INTRAMUSCULAR; INTRAVENOUS EVERY 6 HOURS PRN
Status: DISCONTINUED | OUTPATIENT
Start: 2022-11-30 | End: 2022-11-30 | Stop reason: HOSPADM

## 2022-11-30 RX ORDER — SODIUM CHLORIDE, SODIUM LACTATE, POTASSIUM CHLORIDE, CALCIUM CHLORIDE 600; 310; 30; 20 MG/100ML; MG/100ML; MG/100ML; MG/100ML
INJECTION, SOLUTION INTRAVENOUS CONTINUOUS
Status: ACTIVE | OUTPATIENT
Start: 2022-11-30 | End: 2022-11-30

## 2022-11-30 RX ORDER — ONDANSETRON 2 MG/ML
INJECTION INTRAMUSCULAR; INTRAVENOUS PRN
Status: DISCONTINUED | OUTPATIENT
Start: 2022-11-30 | End: 2022-11-30 | Stop reason: SURG

## 2022-11-30 RX ORDER — ACETAMINOPHEN 325 MG/1
650 TABLET ORAL EVERY 4 HOURS PRN
Status: DISCONTINUED | OUTPATIENT
Start: 2022-11-30 | End: 2022-11-30 | Stop reason: HOSPADM

## 2022-11-30 RX ORDER — CEFAZOLIN SODIUM 1 G/3ML
INJECTION, POWDER, FOR SOLUTION INTRAMUSCULAR; INTRAVENOUS PRN
Status: DISCONTINUED | OUTPATIENT
Start: 2022-11-30 | End: 2022-11-30 | Stop reason: SURG

## 2022-11-30 RX ORDER — ALBUTEROL SULFATE 2.5 MG/3ML
2.5 SOLUTION RESPIRATORY (INHALATION)
Status: DISCONTINUED | OUTPATIENT
Start: 2022-11-30 | End: 2022-11-30 | Stop reason: HOSPADM

## 2022-11-30 RX ORDER — SODIUM CHLORIDE, SODIUM LACTATE, POTASSIUM CHLORIDE, CALCIUM CHLORIDE 600; 310; 30; 20 MG/100ML; MG/100ML; MG/100ML; MG/100ML
INJECTION, SOLUTION INTRAVENOUS CONTINUOUS
Status: DISCONTINUED | OUTPATIENT
Start: 2022-11-30 | End: 2022-11-30 | Stop reason: HOSPADM

## 2022-11-30 RX ORDER — HYDROMORPHONE HYDROCHLORIDE 1 MG/ML
0.4 INJECTION, SOLUTION INTRAMUSCULAR; INTRAVENOUS; SUBCUTANEOUS
Status: DISCONTINUED | OUTPATIENT
Start: 2022-11-30 | End: 2022-11-30 | Stop reason: HOSPADM

## 2022-11-30 RX ORDER — SCOLOPAMINE TRANSDERMAL SYSTEM 1 MG/1
1 PATCH, EXTENDED RELEASE TRANSDERMAL ONCE
Status: DISCONTINUED | OUTPATIENT
Start: 2022-11-30 | End: 2022-11-30 | Stop reason: HOSPADM

## 2022-11-30 RX ORDER — HALOPERIDOL 5 MG/ML
1 INJECTION INTRAMUSCULAR
Status: DISCONTINUED | OUTPATIENT
Start: 2022-11-30 | End: 2022-11-30 | Stop reason: HOSPADM

## 2022-11-30 RX ORDER — SUCRALFATE 1 G/1
1 TABLET ORAL
Qty: 56 TABLET | Refills: 0 | Status: SHIPPED | OUTPATIENT
Start: 2022-11-30 | End: 2022-12-14

## 2022-11-30 RX ORDER — ISOPROTERENOL HYDROCHLORIDE 0.2 MG/ML
INJECTION, SOLUTION INTRAVENOUS
Status: COMPLETED
Start: 2022-11-30 | End: 2022-11-30

## 2022-11-30 RX ORDER — DIPHENHYDRAMINE HYDROCHLORIDE 50 MG/ML
12.5 INJECTION INTRAMUSCULAR; INTRAVENOUS
Status: DISCONTINUED | OUTPATIENT
Start: 2022-11-30 | End: 2022-11-30 | Stop reason: HOSPADM

## 2022-11-30 RX ORDER — DEXAMETHASONE SODIUM PHOSPHATE 4 MG/ML
INJECTION, SOLUTION INTRA-ARTICULAR; INTRALESIONAL; INTRAMUSCULAR; INTRAVENOUS; SOFT TISSUE PRN
Status: DISCONTINUED | OUTPATIENT
Start: 2022-11-30 | End: 2022-11-30 | Stop reason: SURG

## 2022-11-30 RX ORDER — LIDOCAINE HYDROCHLORIDE 40 MG/ML
SOLUTION TOPICAL PRN
Status: DISCONTINUED | OUTPATIENT
Start: 2022-11-30 | End: 2022-11-30 | Stop reason: SURG

## 2022-11-30 RX ORDER — HYDROMORPHONE HYDROCHLORIDE 1 MG/ML
0.2 INJECTION, SOLUTION INTRAMUSCULAR; INTRAVENOUS; SUBCUTANEOUS
Status: DISCONTINUED | OUTPATIENT
Start: 2022-11-30 | End: 2022-11-30 | Stop reason: HOSPADM

## 2022-11-30 RX ORDER — LABETALOL HYDROCHLORIDE 5 MG/ML
5 INJECTION, SOLUTION INTRAVENOUS
Status: DISCONTINUED | OUTPATIENT
Start: 2022-11-30 | End: 2022-11-30 | Stop reason: HOSPADM

## 2022-11-30 RX ORDER — OXYCODONE HCL 5 MG/5 ML
5 SOLUTION, ORAL ORAL
Status: DISCONTINUED | OUTPATIENT
Start: 2022-11-30 | End: 2022-11-30 | Stop reason: HOSPADM

## 2022-11-30 RX ORDER — HEPARIN SODIUM 1000 [USP'U]/ML
INJECTION, SOLUTION INTRAVENOUS; SUBCUTANEOUS
Status: COMPLETED
Start: 2022-11-30 | End: 2022-11-30

## 2022-11-30 RX ORDER — HYDROMORPHONE HYDROCHLORIDE 1 MG/ML
0.1 INJECTION, SOLUTION INTRAMUSCULAR; INTRAVENOUS; SUBCUTANEOUS
Status: DISCONTINUED | OUTPATIENT
Start: 2022-11-30 | End: 2022-11-30 | Stop reason: HOSPADM

## 2022-11-30 RX ORDER — LIDOCAINE HYDROCHLORIDE 20 MG/ML
INJECTION, SOLUTION EPIDURAL; INFILTRATION; INTRACAUDAL; PERINEURAL PRN
Status: DISCONTINUED | OUTPATIENT
Start: 2022-11-30 | End: 2022-11-30 | Stop reason: SURG

## 2022-11-30 RX ORDER — HYDRALAZINE HYDROCHLORIDE 20 MG/ML
5 INJECTION INTRAMUSCULAR; INTRAVENOUS
Status: DISCONTINUED | OUTPATIENT
Start: 2022-11-30 | End: 2022-11-30 | Stop reason: HOSPADM

## 2022-11-30 RX ORDER — PROTAMINE SULFATE 10 MG/ML
INJECTION, SOLUTION INTRAVENOUS PRN
Status: DISCONTINUED | OUTPATIENT
Start: 2022-11-30 | End: 2022-11-30 | Stop reason: SURG

## 2022-11-30 RX ORDER — ONDANSETRON 2 MG/ML
4 INJECTION INTRAMUSCULAR; INTRAVENOUS
Status: COMPLETED | OUTPATIENT
Start: 2022-11-30 | End: 2022-11-30

## 2022-11-30 RX ORDER — PROTAMINE SULFATE 10 MG/ML
INJECTION, SOLUTION INTRAVENOUS
Status: COMPLETED
Start: 2022-11-30 | End: 2022-11-30

## 2022-11-30 RX ORDER — LIDOCAINE HYDROCHLORIDE 40 MG/ML
SOLUTION TOPICAL
Status: COMPLETED
Start: 2022-11-30 | End: 2022-11-30

## 2022-11-30 RX ADMIN — HEPARIN SODIUM: 1000 INJECTION, SOLUTION INTRAVENOUS; SUBCUTANEOUS at 08:44

## 2022-11-30 RX ADMIN — FENTANYL CITRATE 50 MCG: 50 INJECTION, SOLUTION INTRAMUSCULAR; INTRAVENOUS at 08:26

## 2022-11-30 RX ADMIN — LIDOCAINE HYDROCHLORIDE 40 MG: 20 INJECTION, SOLUTION EPIDURAL; INFILTRATION; INTRACAUDAL at 08:05

## 2022-11-30 RX ADMIN — HEPARIN SODIUM: 1000 INJECTION, SOLUTION INTRAVENOUS; SUBCUTANEOUS at 08:45

## 2022-11-30 RX ADMIN — HEPARIN SODIUM 4000 UNITS: 200 INJECTION, SOLUTION INTRAVENOUS at 08:27

## 2022-11-30 RX ADMIN — ONDANSETRON 4 MG: 2 INJECTION INTRAMUSCULAR; INTRAVENOUS at 12:07

## 2022-11-30 RX ADMIN — PROPOFOL 50 MG: 10 INJECTION, EMULSION INTRAVENOUS at 10:47

## 2022-11-30 RX ADMIN — PROTAMINE SULFATE 50 MG: 10 INJECTION, SOLUTION INTRAVENOUS at 10:43

## 2022-11-30 RX ADMIN — SUGAMMADEX 200 MG: 100 INJECTION, SOLUTION INTRAVENOUS at 10:43

## 2022-11-30 RX ADMIN — SODIUM CHLORIDE, POTASSIUM CHLORIDE, SODIUM LACTATE AND CALCIUM CHLORIDE: 600; 310; 30; 20 INJECTION, SOLUTION INTRAVENOUS at 07:22

## 2022-11-30 RX ADMIN — LIDOCAINE HYDROCHLORIDE 4 ML: 40 SOLUTION TOPICAL at 08:05

## 2022-11-30 RX ADMIN — FENTANYL CITRATE 50 MCG: 50 INJECTION, SOLUTION INTRAMUSCULAR; INTRAVENOUS at 08:03

## 2022-11-30 RX ADMIN — PROPOFOL 50 MG: 10 INJECTION, EMULSION INTRAVENOUS at 09:53

## 2022-11-30 RX ADMIN — ROCURONIUM BROMIDE 50 MG: 10 INJECTION, SOLUTION INTRAVENOUS at 08:05

## 2022-11-30 RX ADMIN — PROPOFOL 100 MG: 10 INJECTION, EMULSION INTRAVENOUS at 08:54

## 2022-11-30 RX ADMIN — ONDANSETRON 4 MG: 2 INJECTION INTRAMUSCULAR; INTRAVENOUS at 10:43

## 2022-11-30 RX ADMIN — PROPOFOL 200 MG: 10 INJECTION, EMULSION INTRAVENOUS at 08:05

## 2022-11-30 RX ADMIN — SCOPOLAMINE 1 PATCH: 1.5 PATCH, EXTENDED RELEASE TRANSDERMAL at 07:28

## 2022-11-30 RX ADMIN — CEFAZOLIN 3 G: 330 INJECTION, POWDER, FOR SOLUTION INTRAMUSCULAR; INTRAVENOUS at 08:17

## 2022-11-30 RX ADMIN — ISOPROTERENOL HYDROCHLORIDE 0.2 MG: 0.2 INJECTION, SOLUTION INTRACARDIAC; INTRAMUSCULAR; INTRAVENOUS; SUBCUTANEOUS at 08:44

## 2022-11-30 RX ADMIN — DEXAMETHASONE SODIUM PHOSPHATE 8 MG: 4 INJECTION, SOLUTION INTRA-ARTICULAR; INTRALESIONAL; INTRAMUSCULAR; INTRAVENOUS; SOFT TISSUE at 08:09

## 2022-11-30 RX ADMIN — EPHEDRINE SULFATE 10 MG: 50 INJECTION INTRAMUSCULAR; INTRAVENOUS; SUBCUTANEOUS at 08:20

## 2022-11-30 RX ADMIN — HEPARIN SODIUM 2000 UNITS: 200 INJECTION, SOLUTION INTRAVENOUS at 08:27

## 2022-11-30 ASSESSMENT — PAIN SCALES - GENERAL: PAIN_LEVEL: 1

## 2022-11-30 ASSESSMENT — PAIN DESCRIPTION - PAIN TYPE: TYPE: SURGICAL PAIN

## 2022-11-30 ASSESSMENT — FIBROSIS 4 INDEX: FIB4 SCORE: 1.01

## 2022-11-30 NOTE — DISCHARGE INSTRUCTIONS
HOME CARE INSTRUCTIONS    ACTIVITY: Rest and take it easy for the first 24 hours.  A responsible adult is recommended to remain with you during that time.  It is normal to feel sleepy.  We encourage you to not do anything that requires balance, judgment or coordination.    FOR 24 HOURS DO NOT:  Drive, operate machinery or run household appliances.  Drink beer or alcoholic beverages.  Make important decisions or sign legal documents.    SPECIAL INSTRUCTIONS: [facility] Post Ablation Patient Instructions:  No lifting > 10 lbs x 1 week.      No soaking in baths, hot tubs, pools x 1 week.  May shower the day after discharge and take off groin dressings and leave  sites uncovered.  Continue to monitor sites daily for warmth, redness, discolored drainage.  It is common to have a small lump in the area where the cather was (usually the size of a marble); this will go away but takes approximately 6 weeks to normalize.     3.   Please take all medications as prescribed to you; please do not stop any medications prescribed post ablation unless directed by your healthcare provider.      4.   Please do not miss any doses of your blood thinner (if you have been started on, or take chronic blood thinners) without discussion with your healthcare provider first.     5.   Please walk and take deep breaths after discharge.  After discharge, if you experience neurological changes/signs of stroke or high fever you should be seen in the emergency dept.     6.   It is possible you may experience some chest discomfort or chest tightness post ablation.  This is usually secondary to inflammation and irritation of the tissues at the area of the ablation.  If this occurs, it is advised to try 400 mg of Ibuprofen with food as needed up to three times a day for a maximum of two days.  This should help to decrease pain and tissue inflammation.          **Please notify the office ([phone]) if this occurs.          ** DO NOT TAKE Ibuprofen IF HISTORY OF ALLERGY, SIGNIFICANT BLEEDING OR KIDNEY DISEASE WITHOUT DISCUSSING WITH YOUR CARDIOLOGY PROVIDER FIRST.          ** If pain becomes severe or you have additional symptoms you may need to be medically evaluated; please contact the cardiology office ([phone]) for further direction.     7. It is possible that you may experience arrhythmia/Atrial Fibrillation post ablation.  This is secondary to irritation and inflammation of the cardiac tissues from the ablation.  If you have atrial fibrillation all day or feel poorly with it, please notify your cardiologist's via phone ([phone]) or Evomailhart.      8.  Please contact call our office ([phone]) or message via SnapSense message if you have any questions or concerns post procedurally.    9. You need to be seen for post ablation follow up 3-4 weeks post procedure. An appointment is scheduled for you.  Please contact the office ([phone]) if you need to change your appointment.      DIET: To avoid nausea, slowly advance diet as tolerated, avoiding spicy or greasy foods for the first day.  Add more substantial food to your diet according to your physician's instructions.  Babies can be fed formula or breast milk as soon as they are hungry.  INCREASE FLUIDS AND FIBER TO AVOID CONSTIPATION.    MEDICATIONS: Resume taking daily medication.  Take prescribed pain medication with food.  If no medication is prescribed, you may take non-aspirin pain medication if needed.  PAIN MEDICATION CAN BE VERY CONSTIPATING.  Take a stool softener or laxative such as senokot, pericolace, or milk of magnesia if needed.    Prescription given for Omeprazole and Sulcrafate.     A follow-up appointment should be arranged with your doctor; call to schedule.    You should CALL YOUR PHYSICIAN if you develop:  Fever greater than 101 degrees F.  Pain not relieved by medication, or persistent nausea or vomiting.  Excessive bleeding (blood soaking  through dressing) or unexpected drainage from the wound.  Extreme redness or swelling around the incision site, drainage of pus or foul smelling drainage.  Inability to urinate or empty your bladder within 8 hours.  Problems with breathing or chest pain.    You should call 911 if you develop problems with breathing or chest pain.  If you are unable to contact your doctor or surgical center, you should go to the nearest emergency room or urgent care center.  Physician's telephone #: [phone]    MILD FLU-LIKE SYMPTOMS ARE NORMAL.  YOU MAY EXPERIENCE GENERALIZED MUSCLE ACHES, THROAT IRRITATION, HEADACHE AND/OR SOME NAUSEA.    If any questions arise, call your doctor.  If your doctor is not available, please feel free to call the Surgical Center at [phone].  The Center is open Monday through Friday from 7AM to 7PM.      A registered nurse may call you a few days after your surgery to see how you are doing after your procedure.    You may also receive a survey in the mail within the next two weeks and we ask that you take a few moments to complete the survey and return it to us.  Our goal is to provide you with very good care and we value your comments.     Depression / Suicide Risk    As you are discharged from this [facility] facility, it is important to learn how to keep safe from harming yourself.    Recognize the warning signs:  Abrupt changes in personality, positive or negative- including increase in energy   Giving away possessions  Change in eating patterns- significant weight changes-  positive or negative  Change in sleeping patterns- unable to sleep or sleeping all the time   Unwillingness or inability to communicate  Depression  Unusual sadness, discouragement and loneliness  Talk of wanting to die  Neglect of personal appearance   Rebelliousness- reckless behavior  Withdrawal from people/activities they love  Confusion- inability to concentrate     If you or a loved one observes any of these  behaviors or has concerns about self-harm, here's what you can do:  Talk about it- your feelings and reasons for harming yourself  Remove any means that you might use to hurt yourself (examples: pills, rope, extension cords, firearm)  Get professional help from the community (Mental Health, Substance Abuse, psychological counseling)  Do not be alone:Call your Safe Contact- someone whom you trust who will be there for you.  Call your local CRISIS HOTLINE 220-5424 or 704-157-5245  Call your local Children's Mobile Crisis Response Team [place] (960) 104-0149 or www.Wedia  Call the toll free National Suicide Prevention Hotlines   National Suicide Prevention Lifeline 613-745-ACZS (4659)  National Hope Line Network 800-SUICIDE (947-3510)    I acknowledge receipt and understanding of these Home Care instructions.

## 2022-11-30 NOTE — ANESTHESIA PREPROCEDURE EVALUATION
Date/Time: 11/30/22 0800    Scheduled providers: Alexi Faith M.D.    Procedure: CL-EP ABLATION ATRIAL FLUTTER    Diagnosis:       Atrial fibrillation, unspecified type (HCC) [I48.91]      Atrial flutter, unspecified type (HCC) [I48.92]      Unspecified atrial flutter [I48.92]    Indications: See Associated Dx    Location: [facility] IM[facility] - CATH LAB - [facility]          Relevant Problems   ANESTHESIA   (positive) ARCHANA (obstructive sleep apnea)      CARDIAC   (positive) Cardiac pacemaker in situ      Other   (positive) Idiopathic chronic gout of right foot without tophus       Physical Exam    Airway   Mallampati: III  TM distance: >3 FB  Neck ROM: limited       Cardiovascular - normal exam  Rhythm: regular  Rate: normal  (-) murmur     Dental - normal exam           Pulmonary - normal exam  Breath sounds clear to auscultation     Abdominal   (+) obese     Neurological - normal exam                 Anesthesia Plan    ASA 3   ASA physical status 3 criteria: morbid obesity - BMI greater than or equal to 40 and implanted pacemaker    Plan - general       Airway plan will be ETT  ARACELI Planned        Induction: intravenous    Postoperative Plan: Postoperative administration of opioids is intended.    Pertinent diagnostic labs and testing reviewed    Informed Consent:    Anesthetic plan and risks discussed with patient.    Use of blood products discussed with: patient whom did not consent to blood products.       hx of afib/flutter with multiple ablations, on xarelto last dose last night, ARCHANA, hx of cardiad arrest s/p PM placement 10 years ago here for aflutter ablation and ARACELI. Patient doesn't consent to blood but ok with fractions.

## 2022-11-30 NOTE — OR NURSING
1107: Pt arrived from cath lab post aflutter ablation under anesthesia. Pt is asleep. BL groin sights are CDI. Cardiac rhythm appears to be SR.     1150: Updated pt's spouse.     1205: Pt reports some nausea; medicated per MAR.     1245: ECHO Velez discussed discharge POC with pt at bedside.     1254: Report to phase II RN. Pt to phase II via martina with KASSANDRA Isbell. BL groin sights are CDI.

## 2022-11-30 NOTE — ANESTHESIA POSTPROCEDURE EVALUATION
Patient: Juan Martel    Procedure Summary     Date: 11/30/22 Room / Location: [facility] CATH [facility]    Anesthesia Start: 0757 Anesthesia Stop: 1107    Procedure: CL-EP ABLATION ATRIAL FLUTTER Diagnosis:       Atrial fibrillation, unspecified type (HCC)      Atrial flutter, unspecified type (HCC)      Unspecified atrial flutter      (See Associated Dx)    Scheduled Providers: Alexi Faith M.D.; Alexandro Mendez M.D. Responsible Provider: Alexandro Mendez M.D.    Anesthesia Type: general ASA Status: 3          Final Anesthesia Type: general  Last vitals  BP   Blood Pressure: 117/61    Temp   36.6 °C (97.9 °F)    Pulse   75   Resp   20   SpO2   95 %      Anesthesia Post Evaluation    Patient location during evaluation: PACU  Patient participation: complete - patient participated  Level of consciousness: awake and alert  Pain score: 1    Airway patency: patent  Anesthetic complications: no  Cardiovascular status: hemodynamically stable  Respiratory status: acceptable  Hydration status: euvolemic    PONV: controlled          No notable events documented.     Nurse Pain Score: 0 (NPRS)

## 2022-11-30 NOTE — OP REPORT
[facility]  Electrophysiology Procedure Note    Procedure(s) Performed:   1) SVT ablation and EP study  2) Ablation of additional mechanism of arrhythmia (LA posterior wall isolation)  3) Ablation of additional mechanism of arrhythmia (anterior mitral line)  4) Intracardiac echocardiography during diagnostics and intervention  5) Trans-septal catheterization  6) Arrhythmia induction with IV drug infusion  7) Loretta-procedural device interrogation and reprogramming    Physician(s): Alexi Faith M.D.     Resident/Assistant(s): None     Anesthesia: General endotracheal anesthetic.     Specimen(s) Removed: None     Estimated Blood Loss:  30cc     Complications:  None     Description of Procedure:   After informed written consent, the patient was brought to the EP lab in the fasting, non-sedated state. The patient was prepped and draped in the usual sterile fashion. Pacemaker was interrogated showing stable device and lead parameters and was reprogrammed to VVI 40. Femoral venous access was obtained using the modified Seldinger technique. In the left femoral vein, 1 sheaths (8 Fr) were inserted over 0.035” guidewires. In the right femoral vein, 3 sheaths (8,8,7 Fr) were inserted over 0.035” guidewires. A deflectable decapolar catheter was advanced to the CS position. Baseline rhythm was sinus. An intracardiac echo (ICE) catheter was advanced to the right atrium. ICE was used to identify the atrial septum, left atrial appendage, and pulmonary veins and hipolito the anatomic structures to superimpose on our 3D electroanatomic map using CARTOSound. During the procedure, ICE was utilized to localize the ablation catheter, monitor for thrombus formation, and to exclude pericardial effusion. We initially paced septally from the proximal CS making an activation map of the RA using a Biosense OctaRay catheter which surprisingly showed reconnection along the CTI line with latest RA activation along the lateral wall with collision  of wave fronts here. We attempted to induce atrial flutter which was difficult. On isuprel infusion at 4 mcg/min with repeated rapid atrial burst pacing from the CS we induced an atrial flutter  msec with proximal to distal CS activation. A this point repeat LAT map was made using the OctaRay again showing broad area of early activation along the septum and collision in the lateral RA wall with less than the cycle length accounted for consistent with L sided flutter. Intravenous heparin was administered to maintain -350 seconds. Double transseptal left heart catheterization was performed under intracardiac echo and hemodynamic guidance. A Domee needle was inserted into the 8.5 Fr sheaths (ML1) for transseptal puncture and placement of sheaths in the left atrium. The initial trans-septal was then exchanged for a medium curved Biosense Boticca and the ML1 reused for a second trans-septal. Unfortunately during trans-septal catheterization the atrial flutter had degenerated into atrial fibrillation and was no longer organized.  Mapping of the pulmonary veins and left atrium was performed using CARTO3 FAM and a deflectable multipolar mapping catheter (Biosense OctaRay). This showed durably isolated PVs and durable prior roof line but patchy voltage along the posterior wall through leaks in the floor line outside the RIPV opposite the esophagus and patchy anterior wall scar. Additional ablation to consolidate the prior floor line and to target areas of voltage within the posterior wall was performed using an 3.5 mm deflectable irrigated force contact catheter (Biosense ST SF) at primarily 30 W power. After repeat isolation of the posterior wall patient remained in AF. We decided to consolidate the anterior wall scar into a anterior mitral line with the goal of potentially organizing the arrhythmia. Ablation along the anterior wall was done at 40W connecting the roof line through patchy scarring to the mitral  annulus. This failed to organize or terminate the arrhythmia. External cardioversion at 200J was performed to restore sinus rhythm. Additional lesion sets lateral to the mitral line at the base of the appendage were performed to achieve block across the mitral line. We then withdrew catheters to the RA and performed ablation along the CTI at 40W. Block along the CTI was verified with differential pacing and trans-isthmus conduction time > 160 msec. Further attempts to induce flutter or fib with burst pacing failed to induce arrhythmia despite isuprel infusion. At the end of the procedure, heparin was reversed with protamine, the catheter and sheaths were removed, and hemostasis was achieved by manual compression. Pacemaker was re-interrogated showing stable lead parameters and reprogrammed to original settings. Following recovery from anesthesia, the patient was transferred to the PACU in good condition.       Total ablation time: 1538 seconds    Fluoroscopy time: 10.8 minutes     Electrophysiologic Findings:    1. Sinus  658 ms, AH  HV 56 ms. AVBCL = 301 ms.    Impressions:    1. Atypical atrial flutter degenerating into AF  2. Successful RF isolation of LA posterior wall   3. Successful RF ablation of mitral flutter line  4. Successful RF ablation of RA flutter line      Recommendations:  1. Resume anticoagulation.  2. Start carafate x 2 weeks and daily PPI x 1 month.  3. Admit to monitored bedrest.

## 2022-11-30 NOTE — ANESTHESIA PROCEDURE NOTES
Airway    Date/Time: 11/30/2022 8:05 AM  Performed by: Alexandro Mendez M.D.  Authorized by: Alexandro Mendez M.D.     Location:  OR  Urgency:  Elective  Difficult Airway: No    Indications for Airway Management:  Anesthesia      Spontaneous Ventilation: absent    Sedation Level:  Deep  Preoxygenated: Yes    Patient Position:  Sniffing  Mask Difficulty Assessment:  0 - not attempted  Final Airway Type:  Endotracheal airway  Final Endotracheal Airway:  ETT  Cuffed: Yes    Technique Used for Successful ETT Placement:  Direct laryngoscopy    Insertion Site:  Oral  Blade Type:  Jensen  Laryngoscope Blade/Videolaryngoscope Blade Size:  2  ETT Size (mm):  7.5  Measured from:  Teeth  ETT to Teeth (cm):  22  Placement Verified by: auscultation and capnometry    Cormack-Lehane Classification:  Grade I - full view of glottis  Number of Attempts at Approach:  1

## 2022-11-30 NOTE — ANESTHESIA PROCEDURE NOTES
ARACELI    Date/Time: 11/30/2022 8:12 AM  Performed by: Alexandro Mendez M.D.  Authorized by: Alexandro Mendez M.D.     Start Time:11/30/2022 8:12 AM  Preanesthetic Checklist: patient identified, IV checked, site marked, risks and benefits discussed, surgical consent, monitors and equipment checked, pre-op evaluation and timeout performed    Indication for ARACELI: diagnostic   Patient Location: OR  Intubated: Yes  Bite Block: Yes  Heart Visualized: Yes  Insertion: easy    **See FULL ARACELI report in patient's chart via [employer]**

## 2022-11-30 NOTE — PROGRESS NOTES
Seen in afternoon same day discharge EP rounds.  S/P Afib/flutter ablation with Dr. Faith this am.      Conclusions per Dr. Alexi Faith Op Note 11/30/2022:  Procedure(s) Performed:   1) SVT ablation and EP study  2) Ablation of additional mechanism of arrhythmia (LA posterior wall isolation)  3) Ablation of additional mechanism of arrhythmia (anterior mitral line)  4) Intracardiac echocardiography during diagnostics and intervention  5) Trans-septal catheterization  6) Arrhythmia induction with IV drug infusion  7) Loretta-procedural device interrogation and reprogramming     Monitored rhythm NSR.  Vital signs are stable. Bilateral femoral access sites are soft with CDI dressing in place.      I have verified that new discharge prescriptions have been sent to correct pharmacy and provided education about importance of esophageal prophylaxis post ablation.     Close follow up has been arranged as listed below:  Future Appointments   Date Time Provider Department Center   1/12/2023  1:30 PM ALEX Rodriguez JANET None       Discharge instructions discussed with patient:  [facility] Post Ablation Patient Instructions:  No lifting > 10 lbs x 1 week.    No soaking in baths, hot tubs, pools x 1 week.  May shower the day after discharge and take off groin dressings and leave uncovered.  Continue to monitor sites daily for warmth, redness, discolored drainage.  It is common to have a small lump in the area where the cather was (usually the size of a small marble); this will go away but takes approximately 6 weeks to normalize.   3.   Please take all medications as prescribed to you; please do not stop any medications prescribed post ablation unless directed by your healthcare provider.    4.   Please do not miss any doses of your blood thinner (if you have been started on, or take chronic blood thinners) without discussion with your healthcare provider first.   5.   Please walk and take  deep breaths after discharge.  After discharge, if  experiences neurological changes/signs of stroke, high fever, you should be seen in the emergency dept.   6.   It is possible you may experience some chest discomfort or chest tightness post ablation.  This is usually secondary to inflammation and irritation of the tissues at the area of the ablation.  If this occurs, it is advised to try 400 mg of Ibuprofen with food as needed up to three times a day for a maximum of two days.  This should help to decrease pain and tissue inflammation.          **Please notify the office ([phone]) if this occurs.         ** DO NOT TAKE Ibuprofen IF HISTORY OF SIGNIFICANT BLEEDING OR KIDNEY DISEASE WITHOUT DISCUSSING WITH YOUR CARDIOLOGY PROVIDER FIRST.          ** If pain becomes severe or you have additional symptoms you may need to be medically evaluated; please contact the cardiology office ([phone]) for further direction.   7. It is possible that you may experience arrhythmia/Atrial Fibrillation post ablation.  This is secondary to irritation and inflammation of the cardiac tissues from the ablation.  If you have atrial fibrillation all day or feel poorly with it, please notify your cardiologist's via phone ([phone]) or [employer].    8.  Please contact call our office ([phone]) or message via [employer] message if you have any questions or concerns post procedurally.  9. You need to be seen for post ablation follow up 2-4 weeks post procedure.  If you do not have a follow up appointment scheduled, please call [phone] to schedule your follow up appointment.

## 2022-11-30 NOTE — OR NURSING
Patient arrived to Discharge at 1358. Surgical dressing Left /CDI Right scant drainage. VVS. Discharge teaching completed. Questions and concerns addressed. Pt v/u. All pt belongings returned to and sent home with pt. Pt denies further needs prior to discharge. Pt discharged to home via wheelchair with responsible adult. No signs of distress noted.

## 2023-01-11 NOTE — PROGRESS NOTES
Chief Complaint   Patient presents with    Atrial Fibrillation     F/V Dx: PAF (paroxysmal atrial fibrillation) (HCC)       Sita Martel is a 62 y.o. male who presents today for procedural follow-up after repeat ablation 11/30/2022 for atrial flutter.    Procedure completed with isolation of LA posterior wall, ablation of atypical mitral flutter and ablation of RA flutter line.  Noted to have atypical flutter degenerated into A. fib at baseline.    He is followed by Dr. Faith for EP.  Past medical history significant for prior ablation (PVI and CTI line with Dr. Raza, repeat pulmonary vein isolation with LA roof and floor lines with Dr. Faith 11/27/18), high risk medication use in the form of dofetilide additional evidence of sick sinus syndrome status post PPM, hypercholesterolemia, anticoagulation.    Today in follow-up he reports he has done well post ablation.  Does not think he has had any episodes of atrial fibrillation reports no chest pain.  No shortness of breath no significant dizziness or presyncope.  No peripheral edema.  Has been back at the gym working out and tolerating well.  Staying locally probably until fall and plans to start traveling again.    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    Pneumonia 2000    Sleep apnea     CPAP     Past Surgical History:   Procedure Laterality Date    OTHER CARDIAC SURGERY  12/20/16    pacemaker    OTHER ORTHOPEDIC SURGERY  6/1/1990    achilles tendon repair left foot     Family History   Problem Relation Age of Onset    Heart Disease Mother         atrial fib    Stroke Father 65        CVa    Diabetes Father     Cancer Sister         skin    No Known Problems Brother     Stroke Paternal Grandmother       Social History     Socioeconomic History    Marital status:      Spouse name: Not on file    Number of children: Not on file    Years of education: Not on file    Highest education level: Not on file   Occupational History    Not on file   Tobacco Use    Smoking status: Never    Smokeless tobacco: Never   Vaping Use    Vaping Use: Never used   Substance and Sexual Activity    Alcohol use: Not Currently    Drug use: No    Sexual activity: Not on file   Other Topics Concern    Not on file   Social History Narrative    Not on file     Social Determinants of Health     Financial Resource Strain: Not on file   Food Insecurity: Not on file   Transportation Needs: Not on file   Physical Activity: Not on file   Stress: Not on file   Social Connections: Not on file   Intimate Partner Violence: Not on file   Housing Stability: Not on file     Allergies   Allergen Reactions    Bloodless     Codeine Vomiting    Tramadol Vomiting and Nausea     Outpatient Encounter Medications as of 1/12/2023   Medication Sig Dispense Refill    allopurinol (ZYLOPRIM) 300 MG Tab Take 1 Tablet by mouth every day. 90 Tablet 3    omeprazole (PRILOSEC) 20 MG delayed-release capsule Take 1 Capsule by mouth every day. 30 Capsule 0    Potassium (POTASSIMIN PO) Take 2 Tablets by mouth every day. OTC strength      metoprolol SR (TOPROL XL) 50 MG TABLET SR 24 HR Take 1 Tablet by mouth 2 times a day. 180 Tablet 1    rivaroxaban (XARELTO) 20 MG Tab tablet Take 1 Tablet by mouth with dinner. 90 Tablet 1    Magnesium 500 MG Tab Take 500 mg by mouth every day.       No facility-administered encounter medications on file as of 1/12/2023.     Review of Systems   Constitutional:  Negative for chills, fever, malaise/fatigue and weight loss.   HENT:  Negative for nosebleeds and tinnitus.    Respiratory:  Negative for cough, hemoptysis, sputum production, shortness of breath and wheezing.    Cardiovascular:  Negative for chest pain, palpitations,  "orthopnea, leg swelling and PND.   Gastrointestinal:  Negative for abdominal pain, blood in stool, heartburn, nausea and vomiting.   Genitourinary:  Negative for hematuria.   Musculoskeletal: Negative.    Skin:  Negative for rash.   Neurological:  Negative for dizziness, tingling, tremors, sensory change, speech change, focal weakness, loss of consciousness and headaches.   Psychiatric/Behavioral: Negative.              Objective     /84 (BP Location: Left arm, Patient Position: Sitting, BP Cuff Size: Adult)   Pulse 60   Resp 14   Ht 1.753 m (5' 9\")   Wt (!) 130 kg (286 lb)   SpO2 96%   BMI 42.23 kg/m²     Physical Exam  Vitals reviewed.   Constitutional:       Appearance: Normal appearance. He is well-developed.   HENT:      Head: Normocephalic and atraumatic.      Mouth/Throat:      Mouth: Mucous membranes are moist.      Pharynx: Oropharynx is clear.   Eyes:      Extraocular Movements: Extraocular movements intact.      Conjunctiva/sclera: Conjunctivae normal.      Pupils: Pupils are equal, round, and reactive to light.   Neck:      Vascular: No JVD.   Cardiovascular:      Rate and Rhythm: Normal rate and regular rhythm.      Pulses: Normal pulses.      Heart sounds: Normal heart sounds. No murmur heard.    No friction rub. No gallop.   Pulmonary:      Effort: Pulmonary effort is normal.      Breath sounds: Normal breath sounds. No wheezing or rales.   Chest:      Chest wall: No tenderness.      Comments: Left chest pacemaker site without erythema or edema  Musculoskeletal:         General: Normal range of motion.      Cervical back: Normal range of motion and neck supple.      Right lower leg: No edema.      Left lower leg: No edema.   Skin:     General: Skin is warm and dry.      Capillary Refill: Capillary refill takes 2 to 3 seconds.   Neurological:      Mental Status: He is alert and oriented to person, place, and time.   Psychiatric:         Mood and Affect: Mood normal.         Behavior: " Behavior normal.         Thought Content: Thought content normal.         Judgment: Judgment normal.              Assessment & Plan     1. Atrial fibrillation, unspecified type (HCC)  EKG      2. S/P ablation of atrial fibrillation        3. Cardiac pacemaker in situ        4. ARCHANA (obstructive sleep apnea)        5. Circulating anticoagulants (HCC)        6. Morbid obesity with BMI of 40.0-44.9, adult (HCC)        7. Pure hypercholesterolemia            Medical Decision Making: Today's Assessment/Status/Plan:   1.  Atrial fibrillation/flutter  2.  Status post ablation  - Status post reisolation of LA posterior wall and ablation of atypical mitral flutter by Dr. Alexi bower 11/30/2022.  - Clinically is doing well post ablation.  Twelve-lead EKG today reveals atrial paced/sinus rhythm.  Device episode log reveals 1 extremely brief atrial flutter lasting approximately 4 seconds post ablation.  -He is additionally status post 2 prior ablations, 1 with Dr. Raza in 1 with Dr. Bower.  -No longer on antiarrhythmics.  Continue metoprolol 50 mg twice daily, continue Xarelto 20 mg every evening.    3.  [place] pacemaker  - Dual-chamber device placed secondary to sick sinus syndrome.  - I interrogated the device today in office and is found to have normal function with stable lead parameters.  - Minimal RV pacing.  Battery longevity approximately 1.5 years.    4.  ARCHANA  - Continue to wear CPAP as directed by sleep medicine clinic.    5.  Anticoagulation  - [id number] score of 1 (prediabetes) continue Xarelto at this time may be able to consider coming off of Xarelto again in the near future pending how ablation heals.    6.  Hypercholesterolemia  - Lipid panel thus far with low HDL at 34 and LDL of 130.  Discussed CT cardiac score and rationale behind testing.  He will think about completing it, test is already ordered from his PCP.    Return to clinic in approximately 2 months for review, sooner if clinical condition changes.   He will contact our office via [employer] or telephone call should any questions or concerns arise.

## 2023-01-12 ENCOUNTER — OFFICE VISIT (OUTPATIENT)
Dept: CARDIOLOGY | Facility: MEDICAL CENTER | Age: 63
End: 2023-01-12
Payer: MEDICARE

## 2023-01-12 VITALS
RESPIRATION RATE: 14 BRPM | HEIGHT: 69 IN | SYSTOLIC BLOOD PRESSURE: 126 MMHG | WEIGHT: 286 LBS | DIASTOLIC BLOOD PRESSURE: 84 MMHG | BODY MASS INDEX: 42.36 KG/M2 | OXYGEN SATURATION: 96 % | HEART RATE: 60 BPM

## 2023-01-12 DIAGNOSIS — I48.19 OTHER PERSISTENT ATRIAL FIBRILLATION (HCC): ICD-10-CM

## 2023-01-12 DIAGNOSIS — D68.318 CIRCULATING ANTICOAGULANTS (HCC): ICD-10-CM

## 2023-01-12 DIAGNOSIS — G47.33 OSA (OBSTRUCTIVE SLEEP APNEA): ICD-10-CM

## 2023-01-12 DIAGNOSIS — I48.91 ATRIAL FIBRILLATION, UNSPECIFIED TYPE (HCC): ICD-10-CM

## 2023-01-12 DIAGNOSIS — E78.00 PURE HYPERCHOLESTEROLEMIA: ICD-10-CM

## 2023-01-12 DIAGNOSIS — E66.01 MORBID (SEVERE) OBESITY DUE TO EXCESS CALORIES (HCC): ICD-10-CM

## 2023-01-12 DIAGNOSIS — Z95.0 CARDIAC PACEMAKER IN SITU: ICD-10-CM

## 2023-01-12 DIAGNOSIS — Z98.890 S/P ABLATION OF ATRIAL FIBRILLATION: ICD-10-CM

## 2023-01-12 DIAGNOSIS — Z86.79 S/P ABLATION OF ATRIAL FIBRILLATION: ICD-10-CM

## 2023-01-12 PROCEDURE — 99214 OFFICE O/P EST MOD 30 MIN: CPT | Performed by: NURSE PRACTITIONER

## 2023-01-12 PROCEDURE — 93288 INTERROG EVL PM/LDLS PM IP: CPT | Performed by: NURSE PRACTITIONER

## 2023-01-12 PROCEDURE — 93000 ELECTROCARDIOGRAM COMPLETE: CPT | Performed by: INTERNAL MEDICINE

## 2023-01-12 ASSESSMENT — ENCOUNTER SYMPTOMS
NAUSEA: 0
TINGLING: 0
SPUTUM PRODUCTION: 0
ORTHOPNEA: 0
WEIGHT LOSS: 0
BLOOD IN STOOL: 0
MUSCULOSKELETAL NEGATIVE: 1
FOCAL WEAKNESS: 0
WHEEZING: 0
SHORTNESS OF BREATH: 0
DIZZINESS: 0
COUGH: 0
HEARTBURN: 0
SPEECH CHANGE: 0
FEVER: 0
CHILLS: 0
PALPITATIONS: 0
ABDOMINAL PAIN: 0
TREMORS: 0
SENSORY CHANGE: 0
LOSS OF CONSCIOUSNESS: 0
PSYCHIATRIC NEGATIVE: 1
VOMITING: 0
HEADACHES: 0
HEMOPTYSIS: 0
PND: 0

## 2023-01-12 ASSESSMENT — FIBROSIS 4 INDEX: FIB4 SCORE: 1.01

## 2023-01-17 LAB — EKG IMPRESSION: NORMAL

## 2023-02-06 ENCOUNTER — NON-PROVIDER VISIT (OUTPATIENT)
Dept: CARDIOLOGY | Facility: MEDICAL CENTER | Age: 63
End: 2023-02-06
Payer: MEDICARE

## 2023-02-06 PROCEDURE — 93294 REM INTERROG EVL PM/LDLS PM: CPT | Performed by: INTERNAL MEDICINE

## 2023-02-06 NOTE — CARDIAC REMOTE MONITOR - SCAN
Device transmission reviewed. Device demonstrated appropriate function.       Electronically Signed by: Yao Jj M.D.    2/11/2023  1:31 PM

## 2023-02-27 LAB — LV EJECT FRACT  99904: 50

## 2023-03-06 NOTE — TELEPHONE ENCOUNTER
Fyi:  Remote transmission on 2-.  RV lead impedance > 3000 ohms on 2-7-2022, polarity switch to Unipolar.  Ap: 70%  : 0 %  Presenting rhythm: Sinus rhythm  Scanned into media via [employer].   SUBJECTIVE:    Patient ID: Priyanka Nuñez is a 46 y.o. female.    HPI:   Patient is seen today for complaints of right shoulder pain which has been going on for about 10 to 15 years.  She states that she is using the TENS unit on it.  She is also using a hot shower and heating pad.  She states that she is right-handed and does use her computer mouse at work a lot and states that she is working a lot of shifts she does feel like this makes shoulder pain worse.  She has not any specific injury that she is aware of.  She has not had any work-up done for this. The pain is located in the posterior shoulder and scapular area.    She states that she has also had some weight gain.  She states that she feels like this may be related to her being postmenopausal.  She states that she is also decreased sex drive.  She states that she is just been more tired and rundown.  She states that she is having some hot flashes and night sweats.    Past Medical History:   Diagnosis Date    Anxiety 2015    C. difficile colitis     Carpal tunnel syndrome on right     Depression     Diverticulitis     Fibromyalgia     Headache(784.0)     History of blood transfusion     Irritable bowel syndrome       Current Outpatient Medications on File Prior to Visit   Medication Sig Dispense Refill    amitriptyline (ELAVIL) 50 MG tablet Take 1 tablet by mouth nightly 90 tablet 3     No current facility-administered medications on file prior to visit.     Allergies   Allergen Reactions    Codeine Nausea And Vomiting     Or synth.    Ms Contin [Morphine Sulfate] Other (See Comments)     Doesn't work per patient        Review of Systems   Constitutional:  Negative for activity change, appetite change and fever.   HENT:  Negative for congestion and nosebleeds.    Eyes:  Negative for discharge.   Respiratory:  Negative for cough and wheezing.    Cardiovascular:  Negative for chest pain and leg swelling.   Gastrointestinal:  Negative for abdominal pain,  diarrhea, nausea and vomiting. Genitourinary:  Negative for difficulty urinating, frequency and urgency. Musculoskeletal:  Positive for arthralgias. Negative for back pain and gait problem. +right shoulder pain   Skin:  Negative for color change and rash. Neurological:  Negative for dizziness and headaches. Hematological:  Does not bruise/bleed easily. Psychiatric/Behavioral:  Negative for sleep disturbance and suicidal ideas. OBJECTIVE:    Physical Exam  Vitals reviewed. Constitutional:       General: She is not in acute distress. Appearance: Normal appearance. She is well-developed. She is not diaphoretic. HENT:      Head: Normocephalic and atraumatic. Right Ear: External ear normal.      Left Ear: External ear normal.   Cardiovascular:      Rate and Rhythm: Normal rate and regular rhythm. Pulses: Normal pulses. Heart sounds: Normal heart sounds. No murmur heard. Pulmonary:      Effort: Pulmonary effort is normal. No respiratory distress. Breath sounds: Normal breath sounds. Musculoskeletal:      Cervical back: Normal range of motion and neck supple. Skin:     General: Skin is warm and dry. Neurological:      General: No focal deficit present. Mental Status: She is alert and oriented to person, place, and time. Mental status is at baseline. Psychiatric:         Mood and Affect: Mood normal.         Behavior: Behavior normal.         Thought Content: Thought content normal.         Judgment: Judgment normal.      /74   Pulse 84   Temp 97.1 °F (36.2 °C)   Ht 5' 4\" (1.626 m)   Wt 197 lb (89.4 kg)   LMP 09/13/2018 (Approximate)   SpO2 100%   BMI 33.81 kg/m²      ASSESSMENT/PLAN:    1. Pain of right scapula  -     [facility]  -     CBC with Auto Differential; Future  -     Comprehensive Metabolic Panel; Future  -     TSH with Reflex to FT4; Future  -     Estradiol; Future  -     Follicle Stimulating Hormone;  Future  - Luteinizing Hormone; Future  -     Hemoglobin A1C; Future  -     Insulin, total; Future  2. Fibromyalgia  -     cyclobenzaprine (FLEXERIL) 10 MG tablet; TAKE ONE TABLET BY MOUTH 3 TIMES A DAY AS NEEDED FOR MUSCLES SPASMS, Disp-90 tablet, R-2Normal  -     CBC with Auto Differential; Future  -     Comprehensive Metabolic Panel; Future  -     TSH with Reflex to FT4; Future  -     Estradiol; Future  -     Follicle Stimulating Hormone; Future  -     Luteinizing Hormone; Future  -     Hemoglobin A1C; Future  -     Insulin, total; Future  3. Weight gain  -     CBC with Auto Differential; Future  -     Comprehensive Metabolic Panel; Future  -     TSH with Reflex to FT4; Future  -     Estradiol; Future  -     Follicle Stimulating Hormone; Future  -     Luteinizing Hormone; Future  -     Hemoglobin A1C; Future  -     Insulin, total; Future  4. Fatigue, unspecified type  -     CBC with Auto Differential; Future  -     Comprehensive Metabolic Panel; Future  -     TSH with Reflex to FT4; Future  -     Estradiol; Future  -     Follicle Stimulating Hormone; Future  -     Luteinizing Hormone; Future  -     Hemoglobin A1C; Future  -     Insulin, total; Future       I am putting a referral in for physical therapy for further evaluation and management of the scapular pain. I also sent over meloxicam and Flexeril for her to use as well. I encouraged her to try massage. If this is not helping with her symptoms she is to let me know. Discussed that I would like to get some labs on her since it has been a couple of years since we did blood work on her. She is going to return fasting in the next few weeks to get blood work done.       PDMP Monitoring:    Last PDMP [place] as Reviewed [facility]):  Review User Review Instant Review Result          Last Controlled Substance Monitoring Documentation      [address] Office Visit from 10/4/2017 in [facility] The Prescription Monitoring Report for this patient was reviewed today. filed at 10/11/2017 1508   Periodic Controlled Substance Monitoring Possible medication side effects, risk of tolerance and/or dependence, and alternative treatments discussed., No signs of potential drug abuse or diversion identified., Existing medication contract. filed at 10/11/2017 1508          Urine Drug Screenings (1 yr)       POCT Rapid Drug Screen  Collected: 10/4/2018 10:50 AM (Final result)              POCT Rapid Drug Screen  Resulted: 7/7/2017 (Final result)              POCT Rapid Drug Screen  Resulted: 12/29/2015 (Final result)              POCT Rapid Drug Screen  Resulted: 9/9/2015 (Final result)              POCT Rapid Drug Screen  Collected: 4/27/2015 (Final result)              Drug Screen, Multiple, Urine  Resulted: 4/27/2016 (Edited Result - FINAL)              Urine Drug Screen  Resulted: 3/22/2017 (Final result)              Urine Drug Screen  Resulted: 7/27/2016 (Edited Result - FINAL)                  Medication Contract and Consent for Opioid Use Documents Filed       Patient Documents       Type of Document Status Date Received Received By Description    Medication Contract [Status Missing]  DIXIE MARCIAL Gabapentin 400mg, Ultram 50mg    Medication Contract [Status Missing]  Rolm Ask 06/10/2014    Medication Contract Signed 3/20/2018  1:23 PM Kar EDMONDS Dragon/transcription disclaimer:  Much of this encounter note is electronic transcription/translation of spoken language toprinted texts. The electronic translation of spoken language may be erroneous, or at times, nonsensical words or phrases may be inadvertently transcribed.   Although I have reviewed the note for such errors, some may stillexist.

## 2023-03-07 ENCOUNTER — OFFICE VISIT (OUTPATIENT)
Dept: CARDIOLOGY | Facility: MEDICAL CENTER | Age: 63
End: 2023-03-07
Payer: MEDICARE

## 2023-03-07 VITALS
RESPIRATION RATE: 14 BRPM | HEIGHT: 69 IN | DIASTOLIC BLOOD PRESSURE: 90 MMHG | SYSTOLIC BLOOD PRESSURE: 142 MMHG | WEIGHT: 289 LBS | HEART RATE: 60 BPM | OXYGEN SATURATION: 97 % | BODY MASS INDEX: 42.8 KG/M2

## 2023-03-07 DIAGNOSIS — I49.5 SSS (SICK SINUS SYNDROME) (HCC): ICD-10-CM

## 2023-03-07 DIAGNOSIS — Z86.79 S/P ABLATION OF ATRIAL FIBRILLATION: ICD-10-CM

## 2023-03-07 DIAGNOSIS — Z98.890 S/P ABLATION OF ATRIAL FIBRILLATION: ICD-10-CM

## 2023-03-07 DIAGNOSIS — I48.91 ATRIAL FIBRILLATION, UNSPECIFIED TYPE (HCC): ICD-10-CM

## 2023-03-07 DIAGNOSIS — G47.33 OSA (OBSTRUCTIVE SLEEP APNEA): ICD-10-CM

## 2023-03-07 DIAGNOSIS — Z95.0 CARDIAC PACEMAKER IN SITU: ICD-10-CM

## 2023-03-07 DIAGNOSIS — Z79.01 CHRONIC ANTICOAGULATION: ICD-10-CM

## 2023-03-07 PROCEDURE — 93288 INTERROG EVL PM/LDLS PM IP: CPT | Mod: 59 | Performed by: NURSE PRACTITIONER

## 2023-03-07 PROCEDURE — 99214 OFFICE O/P EST MOD 30 MIN: CPT | Mod: 25 | Performed by: NURSE PRACTITIONER

## 2023-03-07 PROCEDURE — 93000 ELECTROCARDIOGRAM COMPLETE: CPT | Performed by: NURSE PRACTITIONER

## 2023-03-07 ASSESSMENT — ENCOUNTER SYMPTOMS
WEIGHT LOSS: 0
TREMORS: 0
WHEEZING: 0
COUGH: 0
HEARTBURN: 0
LOSS OF CONSCIOUSNESS: 0
SPUTUM PRODUCTION: 0
FEVER: 0
FOCAL WEAKNESS: 0
HEADACHES: 0
ORTHOPNEA: 0
CHILLS: 0
NAUSEA: 0
MUSCULOSKELETAL NEGATIVE: 1
PND: 0
DOUBLE VISION: 0
SPEECH CHANGE: 0
BLOOD IN STOOL: 0
SENSORY CHANGE: 0
PALPITATIONS: 0
BLURRED VISION: 0
SHORTNESS OF BREATH: 0
HEMOPTYSIS: 0
TINGLING: 0
DIZZINESS: 0
PSYCHIATRIC NEGATIVE: 1
VOMITING: 0

## 2023-03-07 ASSESSMENT — FIBROSIS 4 INDEX: FIB4 SCORE: 1.01

## 2023-03-07 NOTE — PROGRESS NOTES
Chief Complaint   Patient presents with    Atrial Fibrillation     F/v Dx:Atrial fibrillation, unspecified type (HCC)       Sita Martel is a 62 y.o. male who presents today for follow-up persistent atrial flutter.    Underwent repeat ablation 11/30/2022 with Dr. Faith with isolation of LA posterior wall, ablation of atypical mitral flutter and ablation of RA flutter line.    He is followed by Dr. Faith for EP.  Past medical history significant for prior ablation, PVI and CTI line with Dr. Raza, repeat pulmonary vein isolation with LA roof and floor lines with Dr. Faith 11/27/2018, prior high risk medication use in the form of dofetilide, sick sinus syndrome status post dual-chamber PPM, hypercholesterolemia and anticoagulation.    Today in follow-up he reports overall doing well.  No symptoms or suspected atrial arrhythmia events.  Reports feels good with good energy.  Has been working out and shoveling snow.  No chest pain or angina.  No shortness of breath.  No presyncope or syncope.  No peripheral edema.    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    Pneumonia 2000    Sleep apnea     CPAP     Past Surgical History:   Procedure Laterality Date    OTHER CARDIAC SURGERY  12/20/16    pacemaker    OTHER ORTHOPEDIC SURGERY  6/1/1990    achilles tendon repair left foot     Family History   Problem Relation Age of Onset    Heart Disease Mother         atrial fib    Stroke Father 65        CVa    Diabetes Father     Cancer Sister         skin    No Known Problems Brother     Stroke Paternal Grandmother      Social History     Socioeconomic History    Marital status:      Spouse name: Not on file    Number of children: Not on file    Years of education: Not on  file    Highest education level: Not on file   Occupational History    Not on file   Tobacco Use    Smoking status: Never    Smokeless tobacco: Never   Vaping Use    Vaping Use: Never used   Substance and Sexual Activity    Alcohol use: Not Currently    Drug use: No    Sexual activity: Not on file   Other Topics Concern    Not on file   Social History Narrative    Not on file     Social Determinants of Health     Financial Resource Strain: Not on file   Food Insecurity: Not on file   Transportation Needs: Not on file   Physical Activity: Not on file   Stress: Not on file   Social Connections: Not on file   Intimate Partner Violence: Not on file   Housing Stability: Not on file     Allergies   Allergen Reactions    Bloodless     Codeine Vomiting    Tramadol Vomiting and Nausea     Outpatient Encounter Medications as of 3/7/2023   Medication Sig Dispense Refill    allopurinol (ZYLOPRIM) 300 MG Tab Take 1 Tablet by mouth every day. 90 Tablet 3    Potassium (POTASSIMIN PO) Take 2 Tablets by mouth every day. OTC strength      metoprolol SR (TOPROL XL) 50 MG TABLET SR 24 HR Take 1 Tablet by mouth 2 times a day. 180 Tablet 1    rivaroxaban (XARELTO) 20 MG Tab tablet Take 1 Tablet by mouth with dinner. 90 Tablet 1    Magnesium 500 MG Tab Take 500 mg by mouth every day.       No facility-administered encounter medications on file as of 3/7/2023.     Review of Systems   Constitutional:  Negative for chills, fever, malaise/fatigue and weight loss.   HENT:  Negative for congestion and nosebleeds.    Eyes:  Negative for blurred vision and double vision.   Respiratory:  Negative for cough, hemoptysis, sputum production, shortness of breath and wheezing.    Cardiovascular:  Negative for chest pain, palpitations, orthopnea, leg swelling and PND.   Gastrointestinal:  Negative for blood in stool, heartburn, nausea and vomiting.   Genitourinary: Negative.    Musculoskeletal: Negative.    Skin:  Negative for rash.   Neurological:   "Negative for dizziness, tingling, tremors, sensory change, speech change, focal weakness, loss of consciousness and headaches.   Psychiatric/Behavioral: Negative.              Objective     BP (!) 142/90 (BP Location: Left arm, Patient Position: Sitting, BP Cuff Size: Adult)   Pulse 60   Resp 14   Ht 1.753 m (5' 9\")   Wt (!) 131 kg (289 lb)   SpO2 97%   BMI 42.68 kg/m²     Physical Exam  Vitals reviewed.   Constitutional:       Appearance: Normal appearance. He is well-developed.   HENT:      Head: Normocephalic and atraumatic.      Mouth/Throat:      Mouth: Mucous membranes are moist.      Pharynx: Oropharynx is clear.   Eyes:      Extraocular Movements: Extraocular movements intact.      Conjunctiva/sclera: Conjunctivae normal.      Pupils: Pupils are equal, round, and reactive to light.   Neck:      Vascular: No JVD.   Cardiovascular:      Rate and Rhythm: Normal rate and regular rhythm.      Pulses: Normal pulses.      Heart sounds: Normal heart sounds. No murmur heard.    No friction rub. No gallop.   Pulmonary:      Effort: Pulmonary effort is normal.      Breath sounds: Normal breath sounds. No wheezing or rales.   Chest:      Chest wall: No tenderness.   Musculoskeletal:         General: Normal range of motion.      Cervical back: Normal range of motion and neck supple.      Right lower leg: No edema.      Left lower leg: No edema.   Skin:     General: Skin is warm and dry.      Capillary Refill: Capillary refill takes 2 to 3 seconds.   Neurological:      Mental Status: He is alert and oriented to person, place, and time.   Psychiatric:         Mood and Affect: Mood normal.         Behavior: Behavior normal.         Thought Content: Thought content normal.         Judgment: Judgment normal.          Twelve-lead EKG 3/7/2023: Atrial paced rhythm    Assessment & Plan     1. Atrial fibrillation, unspecified type (HCC)  EKG      2. S/P ablation of atrial fibrillation        3. Cardiac pacemaker in situ   "      4. SSS (sick sinus syndrome) ([facility])        5. ARCHANA (obstructive sleep apnea)        6. Chronic anticoagulation            Medical Decision Making: Today's Assessment/Status/Plan:   1.  Atrial fibrillation and flutter  2. S/P ablation  - clinically doing well after last ablation Nov 30, 2022.  Ablation with mitral flutter, PWI and CTI for typical RA flutter.  - Prior ablations x 2; one with Dr Raza and one with Dr. Faith  - Not on antiarrhythmics.  Continue Metoprolol XL 50 mg BID.   - Continue Xarelto for now.  Can consider deescalating in the future if rhythm remains controled.  [id number] score of 1-2     3. SSS  4. [place] Dual chamber PPM:  - I interrogated device in office today and device is working normally with stable lead function.   - Low percentage of RV pacing.  - Battery longevity 1.5 yrs.  Has home monitor.     5. ARCHANA:  - Continues with regular CPAP use.    6. Anticoagulation:  - Denies evidence of bleeding.  See discussion #1.       Return to clinic in August for review, sooner if clinical condition changes.    PLEASE NOTE: This Note was created using voice recognition Software. I have made every reasonable attempt to correct obvious errors, but I expect that there are errors of grammar and possibly content that I did not discover before finalizing the note

## 2023-04-20 LAB — EKG IMPRESSION: NORMAL

## 2023-05-04 NOTE — TELEPHONE ENCOUNTER
Is the patient due for a refill? Yes    Was the patient seen the past year? Yes    Date of last office visit: 3/7/2023    Does the patient have an upcoming appointment?  Yes   If yes, When? 8/29/2023    Provider to refill:ADRIENNE    Does the patients insurance require a 100 day supply?  No

## 2023-05-08 ENCOUNTER — NON-PROVIDER VISIT (OUTPATIENT)
Dept: CARDIOLOGY | Facility: MEDICAL CENTER | Age: 63
End: 2023-05-08
Payer: MEDICARE

## 2023-05-08 PROCEDURE — 93294 REM INTERROG EVL PM/LDLS PM: CPT | Performed by: INTERNAL MEDICINE

## 2023-05-08 RX ORDER — METOPROLOL SUCCINATE 25 MG/1
TABLET, EXTENDED RELEASE ORAL
Qty: 180 TABLET | Refills: 3 | Status: SHIPPED | OUTPATIENT
Start: 2023-05-08 | End: 2023-08-29

## 2023-05-09 NOTE — CARDIAC REMOTE MONITOR - SCAN
Device transmission reviewed. Device demonstrated appropriate function.       Electronically Signed by: Yao Jj M.D.    5/10/2023  7:58 AM

## 2023-08-15 ENCOUNTER — NON-PROVIDER VISIT (OUTPATIENT)
Dept: CARDIOLOGY | Facility: MEDICAL CENTER | Age: 63
End: 2023-08-15
Payer: MEDICARE

## 2023-08-15 PROCEDURE — 93294 REM INTERROG EVL PM/LDLS PM: CPT | Performed by: INTERNAL MEDICINE

## 2023-08-16 NOTE — CARDIAC REMOTE MONITOR - SCAN
Device transmission reviewed. Device demonstrated appropriate function.       Electronically Signed by: Giovani Pena M.D.    8/17/2023  12:51 PM

## 2023-08-29 ENCOUNTER — TELEPHONE (OUTPATIENT)
Dept: CARDIOLOGY | Facility: MEDICAL CENTER | Age: 63
End: 2023-08-29
Payer: MEDICARE

## 2023-08-29 ENCOUNTER — OFFICE VISIT (OUTPATIENT)
Dept: CARDIOLOGY | Facility: MEDICAL CENTER | Age: 63
End: 2023-08-29
Attending: INTERNAL MEDICINE
Payer: MEDICARE

## 2023-08-29 VITALS
DIASTOLIC BLOOD PRESSURE: 84 MMHG | WEIGHT: 290 LBS | SYSTOLIC BLOOD PRESSURE: 124 MMHG | HEIGHT: 69 IN | OXYGEN SATURATION: 95 % | RESPIRATION RATE: 14 BRPM | HEART RATE: 121 BPM | BODY MASS INDEX: 42.95 KG/M2

## 2023-08-29 DIAGNOSIS — I48.4 ATYPICAL ATRIAL FLUTTER (HCC): ICD-10-CM

## 2023-08-29 DIAGNOSIS — Z86.79 HISTORY OF ATRIAL FIBRILLATION: ICD-10-CM

## 2023-08-29 DIAGNOSIS — I49.5 SSS (SICK SINUS SYNDROME) (HCC): ICD-10-CM

## 2023-08-29 DIAGNOSIS — Z95.0 CARDIAC PACEMAKER IN SITU: ICD-10-CM

## 2023-08-29 PROCEDURE — 93280 PM DEVICE PROGR EVAL DUAL: CPT | Mod: 26 | Performed by: INTERNAL MEDICINE

## 2023-08-29 PROCEDURE — 3074F SYST BP LT 130 MM HG: CPT | Performed by: INTERNAL MEDICINE

## 2023-08-29 PROCEDURE — 93005 ELECTROCARDIOGRAM TRACING: CPT | Performed by: INTERNAL MEDICINE

## 2023-08-29 PROCEDURE — 99215 OFFICE O/P EST HI 40 MIN: CPT | Mod: 25 | Performed by: INTERNAL MEDICINE

## 2023-08-29 PROCEDURE — 99212 OFFICE O/P EST SF 10 MIN: CPT | Performed by: INTERNAL MEDICINE

## 2023-08-29 PROCEDURE — 3079F DIAST BP 80-89 MM HG: CPT | Performed by: INTERNAL MEDICINE

## 2023-08-29 PROCEDURE — 93280 PM DEVICE PROGR EVAL DUAL: CPT | Performed by: INTERNAL MEDICINE

## 2023-08-29 RX ORDER — SOTALOL HYDROCHLORIDE 80 MG/1
80 TABLET ORAL 2 TIMES DAILY
Qty: 60 TABLET | Refills: 3 | Status: SHIPPED | OUTPATIENT
Start: 2023-08-29 | End: 2023-12-26

## 2023-08-29 ASSESSMENT — FIBROSIS 4 INDEX: FIB4 SCORE: 1.03

## 2023-08-29 NOTE — PROGRESS NOTES
Arrhythmia Clinic Note (Established patient)    DOS: 8/29/2023    Chief complaint/Reason for consult: F/u AF/AFL    Interval History:  Pt is a 64 yo M. He has history of AF/AFL. S/p prior PV isolation with Dr. Raza. Recurrent flutter. Underwent repeat ablation with me for flutter, then repeat again with flutter line leakage and more coarse fib. Additional ablation was done to isolate LA posterior wall and target mitral re-entry (anterior line). Did well for almost a year. Yesterday with recurrent flutter, 2:1. Not feeling well.    ROS (+ highlighted in red):  General--Negative for fatigue, weight loss or weight gain  Cardiovascular--Negative for CP, orthopnea, PND    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    Pneumonia 2000    Sleep apnea     CPAP       Past Surgical History:   Procedure Laterality Date    OTHER CARDIAC SURGERY  12/20/16    pacemaker    OTHER ORTHOPEDIC SURGERY  6/1/1990    achilles tendon repair left foot       Social History     Socioeconomic History    Marital status:      Spouse name: Not on file    Number of children: Not on file    Years of education: Not on file    Highest education level: Not on file   Occupational History    Not on file   Tobacco Use    Smoking status: Never    Smokeless tobacco: Never   Vaping Use    Vaping Use: Never used   Substance and Sexual Activity    Alcohol use: Not Currently    Drug use: No    Sexual activity: Not on file   Other Topics Concern    Not on file   Social History Narrative    Not on file     Social Determinants of Health     Financial Resource Strain: Not on file   Food Insecurity: Not on file   Transportation Needs: Not on file   Physical Activity: Not on file   Stress: Not on file   Social  "Connections: Not on file   Intimate Partner Violence: Not on file   Housing Stability: Not on file       Family History   Problem Relation Age of Onset    Heart Disease Mother         atrial fib    Stroke Father 65        CVa    Diabetes Father     Cancer Sister         skin    No Known Problems Brother     Stroke Paternal Grandmother        Allergies   Allergen Reactions    Bloodless     Codeine Vomiting    Tramadol Vomiting and Nausea       Current Outpatient Medications   Medication Sig Dispense Refill    sotalol (BETAPACE) 80 MG Tab Take 1 Tablet by mouth 2 times a day. 60 Tablet 3    XARELTO 20 MG Tab tablet TAKE 1 TABLET BY MOUTH EVERY DAY WITH DINNER 90 Tablet 2    allopurinol (ZYLOPRIM) 300 MG Tab Take 1 Tablet by mouth every day. 90 Tablet 3    Potassium (POTASSIMIN PO) Take 2 Tablets by mouth every day. OTC strength      Magnesium 500 MG Tab Take 500 mg by mouth every day.       No current facility-administered medications for this visit.       Physical Exam:  Vitals:    08/29/23 1507   BP: 124/84   BP Location: Left arm   Patient Position: Sitting   BP Cuff Size: Adult   Pulse: (!) 121   Resp: 14   SpO2: 95%   Weight: (!) 132 kg (290 lb)   Height: 1.753 m (5' 9\")     General appearance: NAD, conversant  Eyes: anicteric sclerae, no lid-lag; PERRLA  HENT: Atraumatic; moist mucous membranes, no ulcerations  Neck: Trachea midline; FROM, supple, no thyromegaly  Lungs: CTA, with normal respiratory effort and no intercostal retractions  CV: RRR, tachy, no MRGs, no JVD  Abdomen: Soft, non-tender; normal bowel sounds, no HSM  Extremities: No peripheral edema. No clubbing or cyanosis.  Skin: Normal temperature, turgor and texture; no rash or ulcers  Psych: Appropriate affect, alert and oriented to person, place and time    Data:  Labs reviewed    Prior echo/stress reviewed:  LVEF 50%    EKG interpreted by me:  2:1 AFL    Impression/Plan:  1. History of atrial fibrillation  EKG      2. Atypical atrial flutter (HCC)  " sotalol (BETAPACE) 80 MG Tab      -I discussed very organized flutter now on recurrence  -Repeat ablation reasonable as this is mappable  -I will initiate outpatient sotalol, EKG this Friday  -Cardioversion ASAP  -Risks/benefits of repeat ablation discussed and he is agreeable, schedule next available      Alexi Faith MD

## 2023-08-29 NOTE — TELEPHONE ENCOUNTER
Remote Transmission 8/29/2023:    Pt presenting in AFL, episode appears to have begun 8/28/2023@12:01 am.    Pt is s/p AFL ablation 11/2022.  Pt is on Xarelto.    Full report scanned into media.

## 2023-08-29 NOTE — TELEPHONE ENCOUNTER
Phone Number Called: [phone]    Call outcome: Did not leave a detailed message. Requested patient to call back.    Message: Called to discuss remote transmission with patient. Asked patient to call back with any symptoms he may be experiencing.

## 2023-08-30 ENCOUNTER — TELEPHONE (OUTPATIENT)
Dept: CARDIOLOGY | Facility: MEDICAL CENTER | Age: 63
End: 2023-08-30
Payer: MEDICARE

## 2023-08-30 NOTE — TELEPHONE ENCOUNTER
Patient scheduled for afib/flutter ablation on 10-13-23 with Dr. Faith. Patient has been instructed to check in at 6:00 for 7:30 case time. Hold Sotalol 3 days before. Message sent to jenny Mcgowan with [facility] notified. Emailed Lizet.

## 2023-08-30 NOTE — TELEPHONE ENCOUNTER
Dr. Faith,    If we want anesthesia for this, my next available anesthesia day is 09-. If they can do conscious sedation, I can possibly get them in on 09-.

## 2023-08-30 NOTE — TELEPHONE ENCOUNTER
----- Message from Alexi Faith M.D. sent at 8/29/2023  5:22 PM PDT -----  Let's schedule him for cardioversion ASAP. Also schedule next available fib/flutter ablation. Would hold sotalol 3 days prior.    Alexi

## 2023-08-31 LAB — EKG IMPRESSION: NORMAL

## 2023-08-31 PROCEDURE — 93010 ELECTROCARDIOGRAM REPORT: CPT | Performed by: INTERNAL MEDICINE

## 2023-08-31 NOTE — TELEPHONE ENCOUNTER
Patient is scheduled for a Cardioversion without anesthesia on 09- with Dr. Valente. Patient has been instructed to check in at 11:30 am for 1:30 procedure. No meds to hold. Message sent to authorizations. Sent [employer] message to pt with instructions.  Juan M at [employer] notified of case. FYI sent to Laila.     Sent a voalte to Dr. Valente if he was ok doing this case with sedation, he informed yes.

## 2023-09-01 ENCOUNTER — NON-PROVIDER VISIT (OUTPATIENT)
Dept: CARDIOLOGY | Facility: MEDICAL CENTER | Age: 63
End: 2023-09-01
Attending: INTERNAL MEDICINE
Payer: MEDICARE

## 2023-09-01 VITALS — DIASTOLIC BLOOD PRESSURE: 82 MMHG | SYSTOLIC BLOOD PRESSURE: 108 MMHG

## 2023-09-01 DIAGNOSIS — Z86.79 HISTORY OF ATRIAL FIBRILLATION: ICD-10-CM

## 2023-09-01 DIAGNOSIS — I48.4 ATYPICAL ATRIAL FLUTTER (HCC): ICD-10-CM

## 2023-09-01 PROCEDURE — 93010 ELECTROCARDIOGRAM REPORT: CPT | Performed by: INTERNAL MEDICINE

## 2023-09-01 PROCEDURE — 93005 ELECTROCARDIOGRAM TRACING: CPT

## 2023-09-01 NOTE — PROGRESS NOTES
Allen Martel is a 63 y.o. male here for a non-provider visit for Blood Pressure Check and EKG.    Vitals:    09/01/23 1419   BP: 108/82     Patient EKG showing HR of 123. Patient has been noticing his HR has been fast at home. While at rest patient does not feel bad. Patient has a pounding sensation with activity. Mild shortness of breath with activity. Patient still able to complete activities at home. Shortness of breath improves with rest. Patient denies chest pain. SS recommending cardioversion. Cardioversion currently scheduled for 9/11. Will attempt to get patient in sooner. Er precautions discussed with patient. Patient verbalizes understanding.

## 2023-09-01 NOTE — PROGRESS NOTES
Patient was here today for EKG per SS.     Patient reports feeling his HR is elevated and some lightheadedness and dizziness.     EKG performed and transferred into patients chart. Paper copy of EKG given to Leigh Ann YU for SS. Patient has been informed that Leigh Ann YU, will be in shortly to speak about EKG.

## 2023-09-07 ENCOUNTER — PRE-ADMISSION TESTING (OUTPATIENT)
Dept: ADMISSIONS | Facility: MEDICAL CENTER | Age: 63
End: 2023-09-07
Attending: INTERNAL MEDICINE
Payer: MEDICARE

## 2023-09-08 ENCOUNTER — PRE-ADMISSION TESTING (OUTPATIENT)
Dept: ADMISSIONS | Facility: MEDICAL CENTER | Age: 63
End: 2023-09-08
Attending: INTERNAL MEDICINE
Payer: MEDICARE

## 2023-09-08 DIAGNOSIS — Z01.810 PRE-OPERATIVE CARDIOVASCULAR EXAMINATION: ICD-10-CM

## 2023-09-08 DIAGNOSIS — Z01.812 PRE-OPERATIVE LABORATORY EXAMINATION: ICD-10-CM

## 2023-09-08 LAB
ALBUMIN SERPL BCP-MCNC: 4.3 G/DL (ref 3.2–4.9)
ALBUMIN/GLOB SERPL: 1.5 G/DL
ALP SERPL-CCNC: 92 U/L (ref 30–99)
ALT SERPL-CCNC: 37 U/L (ref 2–50)
ANION GAP SERPL CALC-SCNC: 11 MMOL/L (ref 7–16)
AST SERPL-CCNC: 19 U/L (ref 12–45)
BILIRUB SERPL-MCNC: 0.4 MG/DL (ref 0.1–1.5)
BUN SERPL-MCNC: 17 MG/DL (ref 8–22)
CALCIUM ALBUM COR SERPL-MCNC: 8.5 MG/DL (ref 8.5–10.5)
CALCIUM SERPL-MCNC: 8.7 MG/DL (ref 8.5–10.5)
CHLORIDE SERPL-SCNC: 106 MMOL/L (ref 96–112)
CO2 SERPL-SCNC: 22 MMOL/L (ref 20–33)
CREAT SERPL-MCNC: 0.9 MG/DL (ref 0.5–1.4)
ERYTHROCYTE [DISTWIDTH] IN BLOOD BY AUTOMATED COUNT: 45 FL (ref 35.9–50)
GFR SERPLBLD CREATININE-BSD FMLA CKD-EPI: 94 ML/MIN/1.73 M 2
GLOBULIN SER CALC-MCNC: 2.8 G/DL (ref 1.9–3.5)
GLUCOSE SERPL-MCNC: 113 MG/DL (ref 65–99)
HCT VFR BLD AUTO: 46.7 % (ref 42–52)
HGB BLD-MCNC: 15.4 G/DL (ref 14–18)
INR PPP: 1.29 (ref 0.87–1.13)
MCH RBC QN AUTO: 30.2 PG (ref 27–33)
MCHC RBC AUTO-ENTMCNC: 33 G/DL (ref 32.3–36.5)
MCV RBC AUTO: 91.6 FL (ref 81.4–97.8)
PLATELET # BLD AUTO: 212 K/UL (ref 164–446)
PMV BLD AUTO: 11.2 FL (ref 9–12.9)
POTASSIUM SERPL-SCNC: 4 MMOL/L (ref 3.6–5.5)
PROT SERPL-MCNC: 7.1 G/DL (ref 6–8.2)
PROTHROMBIN TIME: 16.2 SEC (ref 12–14.6)
RBC # BLD AUTO: 5.1 M/UL (ref 4.7–6.1)
SODIUM SERPL-SCNC: 139 MMOL/L (ref 135–145)
WBC # BLD AUTO: 6.3 K/UL (ref 4.8–10.8)

## 2023-09-08 PROCEDURE — 85610 PROTHROMBIN TIME: CPT

## 2023-09-08 PROCEDURE — 85027 COMPLETE CBC AUTOMATED: CPT

## 2023-09-08 PROCEDURE — 36415 COLL VENOUS BLD VENIPUNCTURE: CPT

## 2023-09-08 PROCEDURE — 80053 COMPREHEN METABOLIC PANEL: CPT

## 2023-09-11 ENCOUNTER — APPOINTMENT (OUTPATIENT)
Dept: CARDIOLOGY | Facility: MEDICAL CENTER | Age: 63
End: 2023-09-11
Attending: INTERNAL MEDICINE
Payer: MEDICARE

## 2023-09-11 ENCOUNTER — HOSPITAL ENCOUNTER (OUTPATIENT)
Facility: MEDICAL CENTER | Age: 63
End: 2023-09-11
Attending: INTERNAL MEDICINE | Admitting: INTERNAL MEDICINE
Payer: MEDICARE

## 2023-09-11 VITALS
TEMPERATURE: 97.7 F | WEIGHT: 290 LBS | DIASTOLIC BLOOD PRESSURE: 68 MMHG | HEART RATE: 60 BPM | RESPIRATION RATE: 22 BRPM | OXYGEN SATURATION: 93 % | SYSTOLIC BLOOD PRESSURE: 118 MMHG | BODY MASS INDEX: 42.83 KG/M2

## 2023-09-11 DIAGNOSIS — Z86.79 HISTORY OF ATRIAL FIBRILLATION: ICD-10-CM

## 2023-09-11 DIAGNOSIS — I48.4 ATYPICAL ATRIAL FLUTTER (HCC): ICD-10-CM

## 2023-09-11 PROBLEM — I48.92 ATRIAL FLUTTER (HCC): Status: ACTIVE | Noted: 2023-09-11

## 2023-09-11 PROBLEM — Z98.890 PONV (POSTOPERATIVE NAUSEA AND VOMITING): Status: ACTIVE | Noted: 2023-09-11

## 2023-09-11 PROBLEM — D68.59 HYPERCOAGULABLE STATE (HCC): Chronic | Status: ACTIVE | Noted: 2023-09-11

## 2023-09-11 PROBLEM — R11.2 PONV (POSTOPERATIVE NAUSEA AND VOMITING): Status: ACTIVE | Noted: 2023-09-11

## 2023-09-11 LAB
EKG IMPRESSION: NORMAL
INR PPP: 1.88 (ref 0.87–1.13)
PROTHROMBIN TIME: 21.8 SEC (ref 12–14.6)

## 2023-09-11 PROCEDURE — 160036 HCHG PACU - EA ADDL 30 MINS PHASE I

## 2023-09-11 PROCEDURE — 700111 HCHG RX REV CODE 636 W/ 250 OVERRIDE (IP): Mod: JZ | Performed by: INTERNAL MEDICINE

## 2023-09-11 PROCEDURE — 770030 HCHG ROOM/CARE - EXTENDED RECOVERY EACH 15 MIN

## 2023-09-11 PROCEDURE — 93005 ELECTROCARDIOGRAM TRACING: CPT | Performed by: INTERNAL MEDICINE

## 2023-09-11 PROCEDURE — 99152 MOD SED SAME PHYS/QHP 5/>YRS: CPT

## 2023-09-11 PROCEDURE — 85610 PROTHROMBIN TIME: CPT

## 2023-09-11 PROCEDURE — 160002 HCHG RECOVERY MINUTES (STAT)

## 2023-09-11 PROCEDURE — 700105 HCHG RX REV CODE 258: Performed by: INTERNAL MEDICINE

## 2023-09-11 PROCEDURE — 93005 ELECTROCARDIOGRAM TRACING: CPT | Mod: XE,76 | Performed by: INTERNAL MEDICINE

## 2023-09-11 PROCEDURE — 700111 HCHG RX REV CODE 636 W/ 250 OVERRIDE (IP): Mod: JZ

## 2023-09-11 PROCEDURE — 93010 ELECTROCARDIOGRAM REPORT: CPT | Mod: 59 | Performed by: INTERNAL MEDICINE

## 2023-09-11 PROCEDURE — 92960 CARDIOVERSION ELECTRIC EXT: CPT | Performed by: INTERNAL MEDICINE

## 2023-09-11 PROCEDURE — 160035 HCHG PACU - 1ST 60 MINS PHASE I

## 2023-09-11 RX ORDER — SOTALOL HYDROCHLORIDE 80 MG/1
80 TABLET ORAL 2 TIMES DAILY
Status: DISCONTINUED | OUTPATIENT
Start: 2023-09-11 | End: 2023-09-11 | Stop reason: HOSPADM

## 2023-09-11 RX ORDER — SODIUM CHLORIDE 9 MG/ML
500 INJECTION, SOLUTION INTRAVENOUS
Status: DISPENSED | OUTPATIENT
Start: 2023-09-11 | End: 2023-09-11

## 2023-09-11 RX ORDER — SODIUM CHLORIDE 9 MG/ML
INJECTION, SOLUTION INTRAVENOUS CONTINUOUS
Status: DISCONTINUED | OUTPATIENT
Start: 2023-09-11 | End: 2023-09-11 | Stop reason: HOSPADM

## 2023-09-11 RX ORDER — ONDANSETRON 2 MG/ML
4 INJECTION INTRAMUSCULAR; INTRAVENOUS ONCE
Status: DISCONTINUED | OUTPATIENT
Start: 2023-09-11 | End: 2023-09-11 | Stop reason: HOSPADM

## 2023-09-11 RX ORDER — ONDANSETRON 4 MG/1
4 TABLET, ORALLY DISINTEGRATING ORAL EVERY 4 HOURS PRN
Status: DISCONTINUED | OUTPATIENT
Start: 2023-09-11 | End: 2023-09-11 | Stop reason: HOSPADM

## 2023-09-11 RX ORDER — MIDAZOLAM HYDROCHLORIDE 1 MG/ML
INJECTION INTRAMUSCULAR; INTRAVENOUS
Status: DISCONTINUED
Start: 2023-09-11 | End: 2023-09-11 | Stop reason: HOSPADM

## 2023-09-11 RX ORDER — MIDAZOLAM HYDROCHLORIDE 1 MG/ML
.5-2 INJECTION INTRAMUSCULAR; INTRAVENOUS PRN
Status: ACTIVE | OUTPATIENT
Start: 2023-09-11 | End: 2023-09-11

## 2023-09-11 RX ORDER — ONDANSETRON 2 MG/ML
INJECTION INTRAMUSCULAR; INTRAVENOUS
Status: COMPLETED
Start: 2023-09-11 | End: 2023-09-11

## 2023-09-11 RX ORDER — PROCHLORPERAZINE EDISYLATE 5 MG/ML
INJECTION INTRAMUSCULAR; INTRAVENOUS
Status: DISCONTINUED
Start: 2023-09-11 | End: 2023-09-11 | Stop reason: HOSPADM

## 2023-09-11 RX ORDER — PROCHLORPERAZINE EDISYLATE 5 MG/ML
10 INJECTION INTRAMUSCULAR; INTRAVENOUS ONCE
Status: COMPLETED | OUTPATIENT
Start: 2023-09-11 | End: 2023-09-11

## 2023-09-11 RX ADMIN — MIDAZOLAM HYDROCHLORIDE 2 MG: 2 INJECTION, SOLUTION INTRAMUSCULAR; INTRAVENOUS at 13:38

## 2023-09-11 RX ADMIN — FENTANYL CITRATE 50 MCG: 0.05 INJECTION, SOLUTION INTRAMUSCULAR; INTRAVENOUS at 13:38

## 2023-09-11 RX ADMIN — ONDANSETRON 4 MG: 2 INJECTION INTRAMUSCULAR; INTRAVENOUS at 14:37

## 2023-09-11 RX ADMIN — PROCHLORPERAZINE EDISYLATE 10 MG: 5 INJECTION INTRAMUSCULAR; INTRAVENOUS at 14:10

## 2023-09-11 RX ADMIN — FENTANYL CITRATE 50 MCG: 0.05 INJECTION, SOLUTION INTRAMUSCULAR; INTRAVENOUS at 13:34

## 2023-09-11 RX ADMIN — MIDAZOLAM HYDROCHLORIDE 2 MG: 2 INJECTION, SOLUTION INTRAMUSCULAR; INTRAVENOUS at 13:34

## 2023-09-11 RX ADMIN — ONDANSETRON 4 MG: 4 TABLET, ORALLY DISINTEGRATING ORAL at 19:39

## 2023-09-11 ASSESSMENT — FIBROSIS 4 INDEX: FIB4 SCORE: 0.96

## 2023-09-11 ASSESSMENT — PAIN DESCRIPTION - PAIN TYPE
TYPE: SURGICAL PAIN
TYPE: SURGICAL PAIN

## 2023-09-11 ASSESSMENT — CHA2DS2 SCORE
AGE 75 OR GREATER: NO
HYPERTENSION: NO
CHA2DS2 VASC SCORE: 1
PRIOR STROKE OR TIA OR THROMBOEMBOLISM: NO
VASCULAR DISEASE: NO
AGE 65 TO 74: YES
DIABETES: NO
SEX: MALE

## 2023-09-11 NOTE — PROCEDURES
Procedure performed: External Direct Current Cardioversion    : Eduardo Valente MD PhD FACC    Assistant: None    Anesthesia: I personally supervised conscious sedation from 13:34 to 13:44 with the RN.  The patient received 4 mg Versed and 100 mcg Fentanyl intravenous in divided doses    Indication: Atrial Flutter    Preprocedural Diagnosis:   Atrial Flutter  Postprocedural Diagnosis: Sinus Rhythm      Description of procedure:  Mr. Martel was brought to the pre/post procedure area of the cath lab. Informed consent was obtained. Defibrillator pads were placed in the anterior and posterior position.  WE ATTEMPTED PACING WITH HIS PACEMAKER FOR THREE ATTEMPTS WITHOUT REVERSION TO SINUS RHYTHM.  HE WAS NOTED TO HAVE MUSCULAR STIMULATION OF THE CHEST MUSCLES.  A TIME-OUT was performed. Adequate sedation was obtained with moderate sedation. The patient was successfully cardioverted with  200 J synchronized biphasic energy into sinus rhythm. He was monitored in the recovery area until criteria met.    Conclusion: Successful DC cardioversion    Complications: Patient reported significant post procedural nausea and was given compazine      Electronically signed: Eduardo Valente MD PhD FACC  Cardiologist [facility]

## 2023-09-11 NOTE — DISCHARGE INSTRUCTIONS
HOME CARE INSTRUCTIONS    ACTIVITY: Rest and take it easy for the first 24 hours.  A responsible adult is recommended to remain with you during that time.  It is normal to feel sleepy.  We encourage you to not do anything that requires balance, judgment or coordination.    FOR 24 HOURS DO NOT:  Drive, operate machinery or run household appliances.  Drink beer or alcoholic beverages.  Make important decisions or sign legal documents.    SPECIAL INSTRUCTIONS: Electrical Cardioversion, Care After  This sheet gives you information about how to care for yourself after your procedure. Your health care provider may also give you more specific instructions. If you have problems or questions, contact your health care provider.  What can I expect after the procedure?  After the procedure, it is common to have:  Some redness on the skin where the shocks were given.  Follow these instructions at home:    Do not drive for 24 hours if you were given a medicine to help you relax (sedative).  Take over-the-counter and prescription medicines only as told by your health care provider.  Ask your health care provider how to check your pulse. Check it often.  Rest for 48 hours after the procedure or as told by your health care provider.  Avoid or limit your caffeine use as told by your health care provider.  Contact a health care provider if:  You feel like your heart is beating too quickly or your pulse is not regular.  You have a serious muscle cramp that does not go away.  Get help right away if:    You have discomfort in your chest.  You are dizzy or you feel faint.  You have trouble breathing or you are short of breath.  Your speech is slurred.  You have trouble moving an arm or leg on one side of your body.  Your fingers or toes turn cold or blue.  This information is not intended to replace advice given to you by your health care provider. Make sure you discuss any questions you have with your health care provider.  Document  Released: 10/08/2014 Document Revised: 11/30/2018 Document Reviewed: 06/23/2017  [employer] Patient Education © 2020 [employer]      DIET: To avoid nausea, slowly advance diet as tolerated, avoiding spicy or greasy foods for the first day.  Add more substantial food to your diet according to your physician's instructions.  Babies can be fed formula or breast milk as soon as they are hungry.  INCREASE FLUIDS AND FIBER TO AVOID CONSTIPATION.      MEDICATIONS: Resume taking daily medication.  Take prescribed pain medication with food.  If no medication is prescribed, you may take non-aspirin pain medication if needed.  PAIN MEDICATION CAN BE VERY CONSTIPATING.  Take a stool softener or laxative such as senokot, pericolace, or milk of magnesia if needed.      A follow-up appointment should be arranged with your doctor in 1-2 weeks; call to schedule.    You should CALL YOUR PHYSICIAN if you develop:  Fever greater than 101 degrees F.  Pain not relieved by medication, or persistent nausea or vomiting.  Excessive bleeding (blood soaking through dressing) or unexpected drainage from the wound.  Extreme redness or swelling around the incision site, drainage of pus or foul smelling drainage.  Inability to urinate or empty your bladder within 8 hours.  Problems with breathing or chest pain.    You should call 911 if you develop problems with breathing or chest pain.  If you are unable to contact your doctor or surgical center, you should go to the nearest emergency room or urgent care center.  Physician's telephone #: [phone]    MILD FLU-LIKE SYMPTOMS ARE NORMAL.  YOU MAY EXPERIENCE GENERALIZED MUSCLE ACHES, THROAT IRRITATION, HEADACHE AND/OR SOME NAUSEA.    If any questions arise, call your doctor.  If your doctor is not available, please feel free to call the Surgical Center at [phone].  The Center is open Monday through Friday from 7AM to 7PM.      A registered nurse may call you a few days after your surgery  to see how you are doing after your procedure.    You may also receive a survey in the mail within the next two weeks and we ask that you take a few moments to complete the survey and return it to us.  Our goal is to provide you with very good care and we value your comments.     Depression / Suicide Risk    As you are discharged from this [facility] facility, it is important to learn how to keep safe from harming yourself.    Recognize the warning signs:  Abrupt changes in personality, positive or negative- including increase in energy   Giving away possessions  Change in eating patterns- significant weight changes-  positive or negative  Change in sleeping patterns- unable to sleep or sleeping all the time   Unwillingness or inability to communicate  Depression  Unusual sadness, discouragement and loneliness  Talk of wanting to die  Neglect of personal appearance   Rebelliousness- reckless behavior  Withdrawal from people/activities they love  Confusion- inability to concentrate     If you or a loved one observes any of these behaviors or has concerns about self-harm, here's what you can do:  Talk about it- your feelings and reasons for harming yourself  Remove any means that you might use to hurt yourself (examples: pills, rope, extension cords, firearm)  Get professional help from the community (Mental Health, Substance Abuse, psychological counseling)  Do not be alone:Call your Safe Contact- someone whom you trust who will be there for you.  Call your local CRISIS HOTLINE 752-5849 or 209-854-7531  Call your local Children's Mobile Crisis Response Team [place] (004) 594-5326 or www.iLumen  Call the toll free National Suicide Prevention Hotlines   National Suicide Prevention Lifeline 120-507-LJIC (6076)  [place] [facility] Line Network 800-SUICIDE (875-8747)    I acknowledge receipt and understanding of these Home Care instructions.

## 2023-09-11 NOTE — OR NURSING
1354: Pt arrives to PACU awake and alert. C/O nausea. Dr. Wilson called and notified. Nausea meds ordered by provider. VSS. Pt states he gets nauseous post procedures usually.     1825: VSS. Pt still slightly nauseous. Report called to Vera YU. Pts wife called and updated.

## 2023-09-12 LAB — EKG IMPRESSION: NORMAL

## 2023-09-12 NOTE — PROGRESS NOTES
Assumed care of patient and heard report from Vera YU.  Patient is A&Ox 4, received one final dose of Zofran per MAR before discharge, Wife pulling car around for pickup, LDAs removed, diet trays discontinued, Patient has all belongings and AVS paperwork.  All needs addressed and questions answered.  Shanthi CANNON escorting patient downstairs.

## 2023-09-12 NOTE — PROGRESS NOTES
Pt arrived to unit via stretcher at 1830. Pt oriented to room, unit, and plan of care. All questions answered at this time. Call light within reach; fall precautions in place. Wife at bedside.

## 2023-09-12 NOTE — PROGRESS NOTES
Patient no longer nauseated and wished to be discharged per orders already placed. Wife at bedside for transport. Discharge process started.       PM RN at bedside and updated.

## 2023-09-13 NOTE — PROGRESS NOTES
Patient had persistent nausea post cardioversion today refractory to compazine and then zofran.  Will admit for extended recovery and he can be discharged when he doesn't have nausea for an hour.  He has a significant history of PONV on chart review.    It is my pleasure to participate in the care of Mr. Martel.  Please do not hesitate to contact me with questions or concerns.    Eduardo Valente MD PhD [facility]  Cardiologist [facility]    Please note that this dictation was created using voice recognition software. There may be errors I did not discover before finalizing the note.     9/13/2023  2:06 PM

## 2023-10-09 ENCOUNTER — PRE-ADMISSION TESTING (OUTPATIENT)
Dept: ADMISSIONS | Facility: MEDICAL CENTER | Age: 63
End: 2023-10-09
Attending: INTERNAL MEDICINE
Payer: MEDICARE

## 2023-10-09 DIAGNOSIS — Z01.812 PRE-OPERATIVE LABORATORY EXAMINATION: ICD-10-CM

## 2023-10-09 DIAGNOSIS — Z01.810 PRE-OPERATIVE CARDIOVASCULAR EXAMINATION: ICD-10-CM

## 2023-10-09 LAB
ALBUMIN SERPL BCP-MCNC: 4.3 G/DL (ref 3.2–4.9)
ALBUMIN/GLOB SERPL: 1.6 G/DL
ALP SERPL-CCNC: 90 U/L (ref 30–99)
ALT SERPL-CCNC: 34 U/L (ref 2–50)
ANION GAP SERPL CALC-SCNC: 10 MMOL/L (ref 7–16)
AST SERPL-CCNC: 16 U/L (ref 12–45)
BASOPHILS # BLD AUTO: 0.6 % (ref 0–1.8)
BASOPHILS # BLD: 0.04 K/UL (ref 0–0.12)
BILIRUB SERPL-MCNC: 0.7 MG/DL (ref 0.1–1.5)
BUN SERPL-MCNC: 14 MG/DL (ref 8–22)
CALCIUM ALBUM COR SERPL-MCNC: 8.5 MG/DL (ref 8.5–10.5)
CALCIUM SERPL-MCNC: 8.7 MG/DL (ref 8.5–10.5)
CHLORIDE SERPL-SCNC: 104 MMOL/L (ref 96–112)
CO2 SERPL-SCNC: 24 MMOL/L (ref 20–33)
CREAT SERPL-MCNC: 0.93 MG/DL (ref 0.5–1.4)
EKG IMPRESSION: NORMAL
EOSINOPHIL # BLD AUTO: 0.14 K/UL (ref 0–0.51)
EOSINOPHIL NFR BLD: 2.1 % (ref 0–6.9)
ERYTHROCYTE [DISTWIDTH] IN BLOOD BY AUTOMATED COUNT: 42.7 FL (ref 35.9–50)
GFR SERPLBLD CREATININE-BSD FMLA CKD-EPI: 91 ML/MIN/1.73 M 2
GLOBULIN SER CALC-MCNC: 2.7 G/DL (ref 1.9–3.5)
GLUCOSE SERPL-MCNC: 93 MG/DL (ref 65–99)
HCT VFR BLD AUTO: 46.5 % (ref 42–52)
HGB BLD-MCNC: 15.4 G/DL (ref 14–18)
IMM GRANULOCYTES # BLD AUTO: 0.02 K/UL (ref 0–0.11)
IMM GRANULOCYTES NFR BLD AUTO: 0.3 % (ref 0–0.9)
INR PPP: 1.66 (ref 0.87–1.13)
LYMPHOCYTES # BLD AUTO: 1.7 K/UL (ref 1–4.8)
LYMPHOCYTES NFR BLD: 25.1 % (ref 22–41)
MCH RBC QN AUTO: 29.8 PG (ref 27–33)
MCHC RBC AUTO-ENTMCNC: 33.1 G/DL (ref 32.3–36.5)
MCV RBC AUTO: 89.9 FL (ref 81.4–97.8)
MONOCYTES # BLD AUTO: 0.54 K/UL (ref 0–0.85)
MONOCYTES NFR BLD AUTO: 8 % (ref 0–13.4)
NEUTROPHILS # BLD AUTO: 4.33 K/UL (ref 1.82–7.42)
NEUTROPHILS NFR BLD: 63.9 % (ref 44–72)
NRBC # BLD AUTO: 0 K/UL
NRBC BLD-RTO: 0 /100 WBC (ref 0–0.2)
PLATELET # BLD AUTO: 216 K/UL (ref 164–446)
PMV BLD AUTO: 11.1 FL (ref 9–12.9)
POTASSIUM SERPL-SCNC: 4 MMOL/L (ref 3.6–5.5)
PROT SERPL-MCNC: 7 G/DL (ref 6–8.2)
PROTHROMBIN TIME: 19.8 SEC (ref 12–14.6)
RBC # BLD AUTO: 5.17 M/UL (ref 4.7–6.1)
SODIUM SERPL-SCNC: 138 MMOL/L (ref 135–145)
WBC # BLD AUTO: 6.8 K/UL (ref 4.8–10.8)

## 2023-10-09 PROCEDURE — 93005 ELECTROCARDIOGRAM TRACING: CPT

## 2023-10-09 PROCEDURE — 93010 ELECTROCARDIOGRAM REPORT: CPT | Performed by: INTERNAL MEDICINE

## 2023-10-09 PROCEDURE — 36415 COLL VENOUS BLD VENIPUNCTURE: CPT

## 2023-10-09 PROCEDURE — 85025 COMPLETE CBC W/AUTO DIFF WBC: CPT

## 2023-10-09 PROCEDURE — 80053 COMPREHEN METABOLIC PANEL: CPT

## 2023-10-09 PROCEDURE — 85610 PROTHROMBIN TIME: CPT

## 2023-10-13 ENCOUNTER — ANESTHESIA (OUTPATIENT)
Dept: CARDIOLOGY | Facility: MEDICAL CENTER | Age: 63
End: 2023-10-13
Payer: MEDICARE

## 2023-10-13 ENCOUNTER — ANESTHESIA EVENT (OUTPATIENT)
Dept: CARDIOLOGY | Facility: MEDICAL CENTER | Age: 63
End: 2023-10-13
Payer: MEDICARE

## 2023-10-13 ENCOUNTER — APPOINTMENT (OUTPATIENT)
Dept: CARDIOLOGY | Facility: MEDICAL CENTER | Age: 63
End: 2023-10-13
Attending: INTERNAL MEDICINE
Payer: MEDICARE

## 2023-10-13 ENCOUNTER — HOSPITAL ENCOUNTER (OUTPATIENT)
Facility: MEDICAL CENTER | Age: 63
End: 2023-10-13
Attending: INTERNAL MEDICINE | Admitting: INTERNAL MEDICINE
Payer: MEDICARE

## 2023-10-13 VITALS
RESPIRATION RATE: 16 BRPM | BODY MASS INDEX: 42.78 KG/M2 | HEART RATE: 92 BPM | DIASTOLIC BLOOD PRESSURE: 84 MMHG | TEMPERATURE: 96.7 F | HEIGHT: 69 IN | SYSTOLIC BLOOD PRESSURE: 148 MMHG | OXYGEN SATURATION: 92 % | WEIGHT: 288.8 LBS

## 2023-10-13 DIAGNOSIS — Z86.79 HISTORY OF ATRIAL FIBRILLATION: ICD-10-CM

## 2023-10-13 DIAGNOSIS — I48.4 ATYPICAL ATRIAL FLUTTER (HCC): ICD-10-CM

## 2023-10-13 LAB
ACT BLD: 293 SEC (ref 74–137)
ACT BLD: 299 SEC (ref 74–137)
ACT BLD: 323 SEC (ref 74–137)
EKG IMPRESSION: NORMAL
INR PPP: 1.77 (ref 0.87–1.13)
PROTHROMBIN TIME: 20.8 SEC (ref 12–14.6)

## 2023-10-13 PROCEDURE — 93010 ELECTROCARDIOGRAM REPORT: CPT | Performed by: INTERNAL MEDICINE

## 2023-10-13 PROCEDURE — 93005 ELECTROCARDIOGRAM TRACING: CPT | Performed by: INTERNAL MEDICINE

## 2023-10-13 PROCEDURE — 93662 INTRACARDIAC ECG (ICE): CPT | Mod: 26 | Performed by: INTERNAL MEDICINE

## 2023-10-13 PROCEDURE — 93653 COMPRE EP EVAL TX SVT: CPT | Performed by: INTERNAL MEDICINE

## 2023-10-13 PROCEDURE — 93623 PRGRMD STIMJ&PACG IV RX NFS: CPT

## 2023-10-13 PROCEDURE — 85347 COAGULATION TIME ACTIVATED: CPT | Mod: 91

## 2023-10-13 PROCEDURE — 93655 ICAR CATH ABLTJ DSCRT ARRHYT: CPT | Performed by: INTERNAL MEDICINE

## 2023-10-13 PROCEDURE — 700111 HCHG RX REV CODE 636 W/ 250 OVERRIDE (IP)

## 2023-10-13 PROCEDURE — 160002 HCHG RECOVERY MINUTES (STAT)

## 2023-10-13 PROCEDURE — 700101 HCHG RX REV CODE 250: Performed by: INTERNAL MEDICINE

## 2023-10-13 PROCEDURE — 700117 HCHG RX CONTRAST REV CODE 255: Performed by: INTERNAL MEDICINE

## 2023-10-13 PROCEDURE — 700101 HCHG RX REV CODE 250

## 2023-10-13 PROCEDURE — 160035 HCHG PACU - 1ST 60 MINS PHASE I

## 2023-10-13 PROCEDURE — 85610 PROTHROMBIN TIME: CPT

## 2023-10-13 PROCEDURE — 160046 HCHG PACU - 1ST 60 MINS PHASE II

## 2023-10-13 PROCEDURE — 36415 COLL VENOUS BLD VENIPUNCTURE: CPT

## 2023-10-13 PROCEDURE — 700101 HCHG RX REV CODE 250: Performed by: ANESTHESIOLOGY

## 2023-10-13 PROCEDURE — 700105 HCHG RX REV CODE 258: Performed by: ANESTHESIOLOGY

## 2023-10-13 PROCEDURE — 700111 HCHG RX REV CODE 636 W/ 250 OVERRIDE (IP): Performed by: ANESTHESIOLOGY

## 2023-10-13 PROCEDURE — 160036 HCHG PACU - EA ADDL 30 MINS PHASE I

## 2023-10-13 PROCEDURE — 93623 PRGRMD STIMJ&PACG IV RX NFS: CPT | Mod: 26 | Performed by: INTERNAL MEDICINE

## 2023-10-13 PROCEDURE — 160047 HCHG PACU  - EA ADDL 30 MINS PHASE II

## 2023-10-13 PROCEDURE — 93325 DOPPLER ECHO COLOR FLOW MAPG: CPT

## 2023-10-13 PROCEDURE — 700111 HCHG RX REV CODE 636 W/ 250 OVERRIDE (IP): Mod: JZ | Performed by: ANESTHESIOLOGY

## 2023-10-13 PROCEDURE — 93312 ECHO TRANSESOPHAGEAL: CPT | Mod: 26 | Performed by: INTERNAL MEDICINE

## 2023-10-13 PROCEDURE — 93462 L HRT CATH TRNSPTL PUNCTURE: CPT | Performed by: INTERNAL MEDICINE

## 2023-10-13 RX ORDER — ALCOHOL 1 ML/ML
10 INJECTION, SOLUTION PERCUTANEOUS ONCE
Status: COMPLETED | OUTPATIENT
Start: 2023-10-13 | End: 2023-10-13

## 2023-10-13 RX ORDER — BUPIVACAINE HYDROCHLORIDE 5 MG/ML
INJECTION, SOLUTION EPIDURAL; INTRACAUDAL
Status: COMPLETED
Start: 2023-10-13 | End: 2023-10-13

## 2023-10-13 RX ORDER — HEPARIN SODIUM 200 [USP'U]/100ML
INJECTION, SOLUTION INTRAVENOUS
Status: COMPLETED
Start: 2023-10-13 | End: 2023-10-13

## 2023-10-13 RX ORDER — ONDANSETRON 2 MG/ML
4 INJECTION INTRAMUSCULAR; INTRAVENOUS EVERY 6 HOURS PRN
Status: DISCONTINUED | OUTPATIENT
Start: 2023-10-13 | End: 2023-10-13 | Stop reason: HOSPADM

## 2023-10-13 RX ORDER — CEFAZOLIN SODIUM 1 G/3ML
INJECTION, POWDER, FOR SOLUTION INTRAMUSCULAR; INTRAVENOUS PRN
Status: DISCONTINUED | OUTPATIENT
Start: 2023-10-13 | End: 2023-10-13 | Stop reason: SURG

## 2023-10-13 RX ORDER — PHENYLEPHRINE HYDROCHLORIDE 10 MG/ML
INJECTION, SOLUTION INTRAMUSCULAR; INTRAVENOUS; SUBCUTANEOUS PRN
Status: DISCONTINUED | OUTPATIENT
Start: 2023-10-13 | End: 2023-10-13 | Stop reason: HOSPADM

## 2023-10-13 RX ORDER — HEPARIN SODIUM 1000 [USP'U]/ML
INJECTION, SOLUTION INTRAVENOUS; SUBCUTANEOUS
Status: COMPLETED
Start: 2023-10-13 | End: 2023-10-13

## 2023-10-13 RX ORDER — ACETAMINOPHEN 325 MG/1
650 TABLET ORAL EVERY 4 HOURS PRN
Status: DISCONTINUED | OUTPATIENT
Start: 2023-10-13 | End: 2023-10-13 | Stop reason: HOSPADM

## 2023-10-13 RX ORDER — SODIUM CHLORIDE, SODIUM LACTATE, POTASSIUM CHLORIDE, CALCIUM CHLORIDE 600; 310; 30; 20 MG/100ML; MG/100ML; MG/100ML; MG/100ML
INJECTION, SOLUTION INTRAVENOUS
Status: DISCONTINUED | OUTPATIENT
Start: 2023-10-13 | End: 2023-10-13 | Stop reason: SURG

## 2023-10-13 RX ORDER — DEXMEDETOMIDINE HYDROCHLORIDE 100 UG/ML
INJECTION, SOLUTION INTRAVENOUS
Status: DISCONTINUED
Start: 2023-10-13 | End: 2023-10-13 | Stop reason: HOSPADM

## 2023-10-13 RX ORDER — PROTAMINE SULFATE 10 MG/ML
INJECTION, SOLUTION INTRAVENOUS
Status: COMPLETED
Start: 2023-10-13 | End: 2023-10-13

## 2023-10-13 RX ORDER — ONDANSETRON 2 MG/ML
4 INJECTION INTRAMUSCULAR; INTRAVENOUS
Status: COMPLETED | OUTPATIENT
Start: 2023-10-13 | End: 2023-10-13

## 2023-10-13 RX ORDER — ONDANSETRON 2 MG/ML
INJECTION INTRAMUSCULAR; INTRAVENOUS PRN
Status: DISCONTINUED | OUTPATIENT
Start: 2023-10-13 | End: 2023-10-13 | Stop reason: SURG

## 2023-10-13 RX ORDER — HEPARIN SODIUM,PORCINE 1000/ML
VIAL (ML) INJECTION PRN
Status: DISCONTINUED | OUTPATIENT
Start: 2023-10-13 | End: 2023-10-13 | Stop reason: SURG

## 2023-10-13 RX ORDER — DIPHENHYDRAMINE HYDROCHLORIDE 50 MG/ML
12.5 INJECTION INTRAMUSCULAR; INTRAVENOUS
Status: DISCONTINUED | OUTPATIENT
Start: 2023-10-13 | End: 2023-10-13 | Stop reason: HOSPADM

## 2023-10-13 RX ORDER — SODIUM CHLORIDE, SODIUM LACTATE, POTASSIUM CHLORIDE, CALCIUM CHLORIDE 600; 310; 30; 20 MG/100ML; MG/100ML; MG/100ML; MG/100ML
INJECTION, SOLUTION INTRAVENOUS CONTINUOUS
Status: ACTIVE | OUTPATIENT
Start: 2023-10-13 | End: 2023-10-13

## 2023-10-13 RX ORDER — COLCHICINE 0.6 MG/1
0.6 TABLET ORAL DAILY
Qty: 7 TABLET | Refills: 0 | Status: SHIPPED | OUTPATIENT
Start: 2023-10-13 | End: 2023-11-02

## 2023-10-13 RX ORDER — SODIUM CHLORIDE, SODIUM LACTATE, POTASSIUM CHLORIDE, CALCIUM CHLORIDE 600; 310; 30; 20 MG/100ML; MG/100ML; MG/100ML; MG/100ML
INJECTION, SOLUTION INTRAVENOUS CONTINUOUS
Status: DISCONTINUED | OUTPATIENT
Start: 2023-10-13 | End: 2023-10-13 | Stop reason: HOSPADM

## 2023-10-13 RX ORDER — DEXMEDETOMIDINE HYDROCHLORIDE 100 UG/ML
INJECTION, SOLUTION INTRAVENOUS PRN
Status: DISCONTINUED | OUTPATIENT
Start: 2023-10-13 | End: 2023-10-13 | Stop reason: SURG

## 2023-10-13 RX ORDER — LIDOCAINE HYDROCHLORIDE 20 MG/ML
INJECTION, SOLUTION INFILTRATION; PERINEURAL
Status: COMPLETED
Start: 2023-10-13 | End: 2023-10-13

## 2023-10-13 RX ORDER — DEXAMETHASONE SODIUM PHOSPHATE 4 MG/ML
INJECTION, SOLUTION INTRA-ARTICULAR; INTRALESIONAL; INTRAMUSCULAR; INTRAVENOUS; SOFT TISSUE PRN
Status: DISCONTINUED | OUTPATIENT
Start: 2023-10-13 | End: 2023-10-13 | Stop reason: SURG

## 2023-10-13 RX ORDER — ISOPROTERENOL HYDROCHLORIDE 0.2 MG/ML
INJECTION, SOLUTION INTRAVENOUS
Status: COMPLETED
Start: 2023-10-13 | End: 2023-10-13

## 2023-10-13 RX ORDER — MEPERIDINE HYDROCHLORIDE 25 MG/ML
12.5 INJECTION INTRAMUSCULAR; INTRAVENOUS; SUBCUTANEOUS
Status: DISCONTINUED | OUTPATIENT
Start: 2023-10-13 | End: 2023-10-13 | Stop reason: HOSPADM

## 2023-10-13 RX ORDER — PROTAMINE SULFATE 10 MG/ML
INJECTION, SOLUTION INTRAVENOUS PRN
Status: DISCONTINUED | OUTPATIENT
Start: 2023-10-13 | End: 2023-10-13 | Stop reason: SURG

## 2023-10-13 RX ADMIN — ONDANSETRON 4 MG: 2 INJECTION INTRAMUSCULAR; INTRAVENOUS at 09:04

## 2023-10-13 RX ADMIN — FENTANYL CITRATE 50 MCG: 50 INJECTION, SOLUTION INTRAMUSCULAR; INTRAVENOUS at 10:28

## 2023-10-13 RX ADMIN — DIPHENHYDRAMINE HYDROCHLORIDE 12.5 MG: 50 INJECTION, SOLUTION INTRAMUSCULAR; INTRAVENOUS at 11:23

## 2023-10-13 RX ADMIN — DEXAMETHASONE SODIUM PHOSPHATE 4 MG: 4 INJECTION INTRA-ARTICULAR; INTRALESIONAL; INTRAMUSCULAR; INTRAVENOUS; SOFT TISSUE at 09:04

## 2023-10-13 RX ADMIN — HEPARIN SODIUM 6000 UNITS: 200 INJECTION, SOLUTION INTRAVENOUS at 08:35

## 2023-10-13 RX ADMIN — CEFAZOLIN 3 G: 1 INJECTION, POWDER, FOR SOLUTION INTRAMUSCULAR; INTRAVENOUS at 08:27

## 2023-10-13 RX ADMIN — DEXMEDETOMIDINE 50 MCG: 100 INJECTION, SOLUTION INTRAVENOUS at 10:28

## 2023-10-13 RX ADMIN — IOHEXOL 13 ML: 350 INJECTION, SOLUTION INTRAVENOUS at 10:30

## 2023-10-13 RX ADMIN — PROTAMINE SULFATE 50 MG: 10 INJECTION, SOLUTION INTRAVENOUS at 10:29

## 2023-10-13 RX ADMIN — PHENYLEPHRINE HYDROCHLORIDE 100 MCG: 10 INJECTION INTRAVENOUS at 08:47

## 2023-10-13 RX ADMIN — ONDANSETRON 4 MG: 2 INJECTION INTRAMUSCULAR; INTRAVENOUS at 11:01

## 2023-10-13 RX ADMIN — ROCURONIUM BROMIDE 50 MG: 10 INJECTION, SOLUTION INTRAVENOUS at 08:40

## 2023-10-13 RX ADMIN — FENTANYL CITRATE 50 MCG: 50 INJECTION, SOLUTION INTRAMUSCULAR; INTRAVENOUS at 08:15

## 2023-10-13 RX ADMIN — Medication 200 MG: at 08:14

## 2023-10-13 RX ADMIN — ALCOHOL 10 ML: 1 INJECTION, SOLUTION PERCUTANEOUS at 10:01

## 2023-10-13 RX ADMIN — HEPARIN SODIUM 5000 UNITS: 1000 INJECTION, SOLUTION INTRAVENOUS; SUBCUTANEOUS at 09:02

## 2023-10-13 RX ADMIN — ISOPROTERENOL HYDROCHLORIDE 0.2 MG: 0.2 INJECTION, SOLUTION INTRACARDIAC; INTRAMUSCULAR; INTRAVENOUS; SUBCUTANEOUS at 10:02

## 2023-10-13 RX ADMIN — HEPARIN SODIUM: 1000 INJECTION, SOLUTION INTRAVENOUS; SUBCUTANEOUS at 08:35

## 2023-10-13 RX ADMIN — FENTANYL CITRATE 50 MCG: 50 INJECTION, SOLUTION INTRAMUSCULAR; INTRAVENOUS at 08:11

## 2023-10-13 RX ADMIN — SUGAMMADEX 200 MG: 100 INJECTION, SOLUTION INTRAVENOUS at 10:28

## 2023-10-13 RX ADMIN — LIDOCAINE HYDROCHLORIDE: 20 INJECTION, SOLUTION INFILTRATION; PERINEURAL at 08:23

## 2023-10-13 RX ADMIN — ROCURONIUM BROMIDE 50 MG: 10 INJECTION, SOLUTION INTRAVENOUS at 08:15

## 2023-10-13 RX ADMIN — PROTAMINE SULFATE 50 MG: 10 INJECTION, SOLUTION INTRAVENOUS at 10:30

## 2023-10-13 RX ADMIN — PROPOFOL 200 MG: 10 INJECTION, EMULSION INTRAVENOUS at 08:13

## 2023-10-13 RX ADMIN — BUPIVACAINE HYDROCHLORIDE: 5 INJECTION, SOLUTION EPIDURAL; INTRACAUDAL at 08:24

## 2023-10-13 RX ADMIN — SODIUM CHLORIDE, POTASSIUM CHLORIDE, SODIUM LACTATE AND CALCIUM CHLORIDE: 600; 310; 30; 20 INJECTION, SOLUTION INTRAVENOUS at 08:03

## 2023-10-13 RX ADMIN — FENTANYL CITRATE 100 MCG: 50 INJECTION, SOLUTION INTRAMUSCULAR; INTRAVENOUS at 09:34

## 2023-10-13 RX ADMIN — HEPARIN SODIUM 10000 UNITS: 1000 INJECTION, SOLUTION INTRAVENOUS; SUBCUTANEOUS at 08:53

## 2023-10-13 ASSESSMENT — PAIN SCALES - GENERAL: PAIN_LEVEL: 1

## 2023-10-13 ASSESSMENT — FIBROSIS 4 INDEX: FIB4 SCORE: 0.83

## 2023-10-13 NOTE — ANESTHESIA PROCEDURE NOTES
Airway    Date/Time: 10/13/2023 8:13 AM    Performed by: Luis F Goyal M.D.  Authorized by: Luis F Goyal M.D.    Location:  OR  Urgency:  Elective  Indications for Airway Management:  Anesthesia      Spontaneous Ventilation: absent    Sedation Level:  Deep  Preoxygenated: Yes    Patient Position:  Sniffing  Final Airway Type:  Endotracheal airway  Final Endotracheal Airway:  ETT  Cuffed: Yes    Technique Used for Successful ETT Placement:  Direct laryngoscopy    Insertion Site:  Oral  Blade Type:  Jensen  Laryngoscope Blade/Videolaryngoscope Blade Size:  2  ETT Size (mm):  7.5  Measured from:  Teeth  ETT to Teeth (cm):  23  Placement Verified by: auscultation and capnometry    Cormack-Lehane Classification:  Grade I - full view of glottis  Number of Attempts at Approach:  1

## 2023-10-13 NOTE — OR NURSING
1524  Pt arrived from PACU, report received from RN. VSS, Dressing Clean, dry intact to  right Groin and also left groin which has scant drng noted unchanged from PACU per RN Report.                    Pt ambulated to chair with minimal SBA. Tolerating Solids, liquids. Denies pain,  claims nausea at present. Will monitor.    1538  Discharge Instructions reviewed with patient and family. Understanding verbalized .   Adequate pain control no active emesis in post op period. Pt has 300 cc bilious emesis. Repositioned for comfort.    1607 nausea abating.    1630  IV DC with cath intact    1639  Left via WC with belongings.

## 2023-10-13 NOTE — H&P
"EP Pre-procedure History and Physical    Date of service: 10/13/2023    Reason for visit/Chief complaint:    HPI:   Pt is a 64 yo M. History of persistent AF and recurrent AFL. S/p x 3 prior ablation attempts. Again recurrent flutter. Here for repeat ablation.    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    Pneumonia 2000    Sleep apnea     CPAP     .sph  Family History   Problem Relation Age of Onset    Heart Disease Mother         atrial fib    Stroke Father 65        CVa    Diabetes Father     Cancer Sister         skin    No Known Problems Brother     Stroke Paternal Grandmother        Physical Exam:  Vitals:    10/13/23 0630   BP: (!) 171/95   Pulse: 82   Resp: 16   Temp: 36.5 °C (97.7 °F)   TempSrc: Temporal   SpO2: 94%   Weight: (!) 131 kg (288 lb 12.8 oz)   Height: 1.753 m (5' 9\")     Gen: NAD, conversant  HEENT: PERRL, EOMI  LUNGS: CTA B, no w/r/r  CV: RRR, no m/r/g, no JVD  Abd: Soft, NT/ND, +BS  Ext: no edema, warm and well perfused    Labs reviewed    EKG interpreted by me:  A paced    Impression/Recs:  1. History of atrial fibrillation  CL-EP ABLATION AFIB-AFLUTTER    CL-EP ABLATION AFIB-AFLUTTER    EC-ARACELI W/ CONT    EC-ARACELI W/ CONT      2. Atypical atrial flutter (HCC)  CL-EP ABLATION AFIB-AFLUTTER    CL-EP ABLATION AFIB-AFLUTTER    EC-ARACELI W/ CONT    EC-ARACELI W/ CONT      -Risks/benefits/alternatives discussed  -All questions answered  -Proceed with repeat ablation    Alexi Faith MD    "

## 2023-10-13 NOTE — ANESTHESIA PROCEDURE NOTES
ARACELI    Date/Time: 10/13/2023 8:39 AM    Performed by: Luis F Goyal M.D.  Authorized by: Luis F Goyal M.D.    Start Time:10/13/2023 8:39 AM  Preanesthetic Checklist: patient identified, IV checked, site marked, risks and benefits discussed, surgical consent, monitors and equipment checked, pre-op evaluation and timeout performed    Indication for ARACELI: diagnostic   Patient Location: OR  Intubated: Yes  Bite Block: Yes  Heart Visualized: Yes  Insertion: atraumatic    **See FULL ARACELI report in patient's chart via CV Synapse**

## 2023-10-13 NOTE — DISCHARGE INSTRUCTIONS
HOME CARE INSTRUCTIONS    ACTIVITY: Rest and take it easy for the first 24 hours.  A responsible adult is recommended to remain with you during that time.  It is normal to feel sleepy.  We encourage you to not do anything that requires balance, judgment or coordination.    FOR 24 HOURS DO NOT:  Drive, operate machinery or run household appliances.  Drink beer or alcoholic beverages.  Make important decisions or sign legal documents.    SPECIAL INSTRUCTIONS:   Post Ablation    Patient Care Instructions  1. No lifting > 10 lbs x 1 week.      2. No soaking in baths, hot tubs, pools x 1 week.  May shower the day after discharge and take off groin dressings and leave uncovered.  Continue to monitor sites daily for warmth, redness, discolored drainage.  It is common to have a small lump in the area where the cather was (usually the size of a small marble); this will go away but takes approximately 6 weeks to normalize.     3.   Please take all medications as prescribed to you; please do not stop any medications prescribed post ablation unless directed by your healthcare provider.      4.   Please do not miss any doses of your blood thinner (if you have been started on, or take chronic blood thinners) without discussion with your healthcare provider first.     5.   Please walk and take deep breaths after discharge.  After discharge, if  experiences neurological changes/signs of stroke, high fever, you should be seen in the emergency dept.     6.   It is possible you may experience some chest discomfort or chest tightness post ablation.  This is usually secondary to inflammation and irritation of the tissues at the area of the ablation.  If this occurs, it is advised to try 400 mg of Ibuprofen with food as needed up to three times a day for a maximum of two days.  This should help to decrease pain and tissue inflammation.          **Please notify the office ([phone]) if this occurs.         ** DO NOT TAKE Ibuprofen IF  HISTORY OF SIGNIFICANT BLEEDING OR KIDNEY DISEASE WITHOUT DISCUSSING WITH YOUR CARDIOLOGY PROVIDER FIRST.          ** If pain becomes severe or you have additional symptoms you may need to be medically evaluated; please contact the cardiology office ([phone]) for further direction.     7. It is possible that you may experience arrhythmia/Atrial Fibrillation post ablation.  This is secondary to irritation and inflammation of the cardiac tissues from the ablation.  If you have atrial fibrillation all day or feel poorly with it, please notify your cardiologist's via phone ([phone]) or [employer].      8.  Please contact call our office ([phone]) or message via HotDesk message if you have any questions or concerns post procedurally.    9. You need to be seen for post ablation follow up 2-4 weeks post procedure.  If you do not have a follow up appointment  already scheduled, please call [phone] to schedule your follow up appointment.        DIET: To avoid nausea, slowly advance diet as tolerated, avoiding spicy or greasy foods for the first day.  Add more substantial food to your diet according to your physician's instructions.  INCREASE FLUIDS AND FIBER TO AVOID CONSTIPATION.      MEDICATIONS: Resume taking daily medication.  Take prescribed pain medication with food.  If no medication is prescribed, you may take non-aspirin pain medication if needed.  PAIN MEDICATION CAN BE VERY CONSTIPATING.  Take a stool softener or laxative such as senokot, pericolace, or milk of magnesia if needed.    A follow-up appointment should be arranged with your doctor in 1-2 weeks; call to schedule.    You should CALL YOUR PHYSICIAN if you develop:  Fever greater than 101 degrees F.  Pain not relieved by medication, or persistent nausea or vomiting.  Excessive bleeding (blood soaking through dressing) or unexpected drainage from the wound.  Extreme redness or swelling around the incision site, drainage of pus or foul  smelling drainage.  Inability to urinate or empty your bladder within 8 hours.  Problems with breathing or chest pain.    You should call 911 if you develop problems with breathing or chest pain.  If you are unable to contact your doctor or surgical center, you should go to the nearest emergency room or urgent care center.  Physician's telephone #: [phone]    MILD FLU-LIKE SYMPTOMS ARE NORMAL.  YOU MAY EXPERIENCE GENERALIZED MUSCLE ACHES, THROAT IRRITATION, HEADACHE AND/OR SOME NAUSEA.    If any questions arise, call your doctor.  If your doctor is not available, please feel free to call the Surgical Center at [phone].  The Center is open Monday through Friday from 7AM to 7PM.      A registered nurse may call you a few days after your surgery to see how you are doing after your procedure.    You may also receive a survey in the mail within the next two weeks and we ask that you take a few moments to complete the survey and return it to us.  Our goal is to provide you with very good care and we value your comments.     Depression / Suicide Risk    As you are discharged from this [facility] facility, it is important to learn how to keep safe from harming yourself.    Recognize the warning signs:  Abrupt changes in personality, positive or negative- including increase in energy   Giving away possessions  Change in eating patterns- significant weight changes-  positive or negative  Change in sleeping patterns- unable to sleep or sleeping all the time   Unwillingness or inability to communicate  Depression  Unusual sadness, discouragement and loneliness  Talk of wanting to die  Neglect of personal appearance   Rebelliousness- reckless behavior  Withdrawal from people/activities they love  Confusion- inability to concentrate     If you or a loved one observes any of these behaviors or has concerns about self-harm, here's what you can do:  Talk about it- your feelings and reasons for harming yourself  Remove  any means that you might use to hurt yourself (examples: pills, rope, extension cords, firearm)  Get professional help from the community (Mental Health, Substance Abuse, psychological counseling)  Do not be alone:Call your Safe Contact- someone whom you trust who will be there for you.  Call your local CRISIS HOTLINE 840-3511 or 633-792-2789  Call your local Children's Mobile Crisis Response Team [place] (722) 697-5718 or www.CrossCore  Call the toll free National Suicide Prevention Hotlines   National Suicide Prevention Lifeline 533-585-KDAX (7960)  [place] Line Network 800-SUICIDE (651-4759)    I acknowledge receipt and understanding of these Home Care instructions.

## 2023-10-13 NOTE — OP REPORT
[facility]  Electrophysiology Procedure Note    Procedure(s) Performed:   1) SVT ablation and EPS (Focal AT for inferolateral LA)  2) Ablation of additional mechanism of tachycardia (mitral flutter)  3) Ablation of additional mechanism of tachycardia (VOM ablation)  4) Arrhythmia induction with IV drug infusion  5) Intracardiac echocardiography  6) Trans-septal catheterization    Indication(s):  Atypical atrial flutter    Physician(s): Alexi Faith M.D.     Resident/Assistant(s): None     Anesthesia: General endotracheal anesthetic with Dr. Goyal     Specimen(s) Removed: None     Estimated Blood Loss:  30cc     Complications:  None     Description of Procedure:   After informed written consent, the patient was brought to the EP lab in the fasting, non-sedated state. The patient was prepped and draped in the usual sterile fashion. Device was interrogated showing stable parameters and programmed to VVI 40. Femoral venous access was obtained using the modified Seldinger technique. In the left femoral vein, 1 sheaths (7 Fr) were inserted over 0.035” guidewires. In the right femoral vein, 3 sheaths (8,8,8 Fr) were inserted over 0.035” guidewires. A deflectable decapolar catheter was advanced to the CS position. Baseline rhythm sinus CL 1050 msec. An intracardiac echo (ICE) catheter was advanced to the right atrium. ICE was used to identify the atrial septum, left atrial appendage, and pulmonary veins and hipolito the anatomic structures to superimpose on our 3D electroanatomic map using CARTOSound. During the procedure, ICE was utilized to localize the ablation catheter, monitor for thrombus formation, and to exclude pericardial effusion. Burst pacing from the proximal CS position easily induced a 2:1 AFL with distal to proximal CS activation  msec consistent with clinical tachycardia.  Intravenous heparin was administered to maintain -350 seconds. Transseptal left heart catheterization was performed under  "intracardiac echo and hemodynamic guidance. A [employer] needle was inserted into the 8.5 Fr sheaths (SL1) for transseptal puncture and placement of sheaths in the left atrium. The SL1 was then exchanged for a BiosYerdle deflectable sheath. Mapping of the pulmonary veins and left atrium was performed using CARTO3 FAM and a deflectable multipolar mapping catheter (Biosense OctaRaTraansmission). This showed durable isolation of PVs and LA posterior wall. Clear dense scar across the anterior \"seat belt\" mitral line that was performed previously and collision of activation along the line septally and from laterally (appendage side). There was broad appearing area of focal early activation along the inferior lateral LA just below and anterior to the LIPV. Ablation in this area terminated the tachycardia. Consolidation lesions were performed along the broad area of early activation. Further burst pacing failed to induce arrhythmia. Isuprel was started at 2 mcg/min and further burst pacing done on drug infusion easily induced again atrial flutter  msec or so, this time with proximal to distal CS activation. Repeat mapping of the LA was performed again this time showing activation consistent with CCW mitral re-entry with very early activation and breakout lateral to the anterior mitral line along the superior ridge at the upper base of the JESSICA. Ablation here failed to terminate. Clearly there was epicardial connection here that was conducting through the prior anterior mitral line. We went ahead and performed an inferior mitral line to connect the lateral mitral annulus to the LIPV, again failed to affect the tachycardia. Given prior focal AT just below this area and difficulty affecting the tachycardia, we went ahead and proceeded to access the coronary sinus for VOM ethanol ablation. The Vizigo was withdrawn into the RA and the CS was accessed with a glidecath and 0.035\" glidewire. A venogram was performed through the " "gavin, demonstrating a vertically directed VOM. This was wired with a 0.014\" Whisper and a 2.0 x 6.0 mm OTW balloon was advanced into the proximal portion and inflated to 8 SUYAPA. Contrast was used to demonstrate occlusion and 10 cc of ethanol was slowly injected and contrast was then used to verify staining post. This again failed to affect the tachycardia. Repeat map of the LA was then performed again now showing significant and dense scar within entirety of the coumadin ridge including on the appendage side. Again earliest signals on the upper appendage side ridge. Further ablation slightly farther into the appendage body terminated the tachycardia. This area was consolidated at 45 W power. Ablation was performed using a [employer] ST SF. Further burst pacing at this point failed to induce any tachycardia. At the end of the procedure, heparin was reversed with protamine, the catheter and sheaths were removed, and hemostasis was achieved by manual compression and Vascade MVP. Following recovery from anesthesia, the patient was transferred to the PACU in good condition. Pacemaker was checked again showing stable lead parameters and was programmed to original settings.    Total ablation time: 1252 seconds    Fluoroscopy time: 18.2 minutes     Electrophysiologic Findings:    1. Sinus  946 ms, HV 55 ms.    Impressions:    1. Focal atrial firing from inferolateral LA and CCW mitral flutter conducting through prior anterior mitral line   2. Successful RF ablation of the AT  3. Successful RF ablation of mitral flutter  4. Successful VOM ethanol ablation       Recommendations:  1. Resume anticoagulation.  2. Start colchicine 0.6 daily x 7 days  3. Transfer to monitored bedrest.    "

## 2023-10-13 NOTE — ANESTHESIA TIME REPORT
Anesthesia Start and Stop Event Times     Date Time Event    10/13/2023 0734 Ready for Procedure     0801 Anesthesia Start     1050 Anesthesia Stop        Responsible Staff  10/13/23    Name Role Begin End    Luis F Goyal M.D. Anesth 0801 1050        Overtime Reason:  no overtime (within assigned shift)    Comments:

## 2023-10-13 NOTE — ANESTHESIA PREPROCEDURE EVALUATION
Date/Time: 10/13/23 0800    Scheduled providers: Alexi Faith M.D.    Procedure: CL-EP ABLATION AFIB-AFLUTTER    Diagnosis:       History of atrial fibrillation [Z86.79]      Atypical atrial flutter (HCC) [I48.4]      Atypical atrial flutter [I48.4]    Indications: See Associated Dx    Location: [facility] - Cath Lab - [facility]          Relevant Problems   ANESTHESIA   (positive) ARCHANA (obstructive sleep apnea)   (positive) PONV (postoperative nausea and vomiting)      CARDIAC   (positive) Atrial flutter (HCC)   (positive) Cardiac pacemaker in situ   (positive) Other persistent atrial fibrillation (HCC)   (positive) SSS (sick sinus syndrome) (HCC)      Other   (positive) Idiopathic chronic gout of right foot without tophus       Physical Exam    Airway   Mallampati: II  TM distance: >3 FB  Neck ROM: full       Cardiovascular - normal exam  Rhythm: regular  Rate: normal  (-) murmur     Dental - normal exam           Pulmonary - normal exam  Breath sounds clear to auscultation     Abdominal    Neurological - normal exam                 Anesthesia Plan    ASA 4       Plan - general       Airway plan will be ETT  ARACELI Planned        Induction: intravenous    Postoperative Plan: Postoperative administration of opioids is intended.    Pertinent diagnostic labs and testing reviewed    Informed Consent:    Anesthetic plan and risks discussed with patient.    Use of blood products discussed with: patient whom consented to blood products.

## 2023-10-13 NOTE — ANESTHESIA POSTPROCEDURE EVALUATION
Patient: Juan Martel    Procedure Summary     Date: 10/13/23 Room / Location: [facility] - Cath Lab [facility]    Anesthesia Start: 0801 Anesthesia Stop: 1050    Procedure: CL-EP ABLATION AFIB-AFLUTTER Diagnosis:       History of atrial fibrillation      Atypical atrial flutter (HCC)      Atypical atrial flutter      (See Associated Dx)    Scheduled Providers: Alexi Faith M.D.; Luis F Goyal M.D. Responsible Provider: Luis F Goyal M.D.    Anesthesia Type: general ASA Status: 4          Final Anesthesia Type: general  Last vitals  BP   Blood Pressure : (!) 171/95    Temp   36.5 °C (97.7 °F)    Pulse   82   Resp   16    SpO2   94 %      Anesthesia Post Evaluation    Patient location during evaluation: PACU  Patient participation: complete - patient participated  Level of consciousness: awake and alert  Pain score: 1    Airway patency: patent  Anesthetic complications: no  Cardiovascular status: adequate and hemodynamically stable  Respiratory status: acceptable  Hydration status: acceptable    PONV: none          No notable events documented.     Nurse Pain Score: 0 (NPRS)

## 2023-10-13 NOTE — OR NURSING
Pt's VSS. Pt A&Ox4. Pt denies pain. Pt has mild nausea. Pt's bilateral groin sites CDI. Pt to discharge home.

## 2023-10-31 NOTE — PROGRESS NOTES
Chief Complaint   Patient presents with    Atrial Fibrillation     F/v Dx:Atrial fibrillation, unspecified type (HCC)       Sita Martel is a 63 y.o. male who presents today for procedural follow-up after ablation recurrent atrial arrhythmias by Dr. Faith 10/13/2023.    Seizure completed with successful ablation of atrial tachycardia from the inferolateral JESSICA and ablation of mitral flutter, VOM ethanol ablation.    He is followed by Dr. Faith for EP.  Additional arrhythmia history significant for prior ablation x3 (PVI isolation with Dr. Raza, repeat ablation for fib/flutter with Dr. Faith x2), antiarrhythmic drug use in the form of sotalol, sick sinus syndrome with dual-chamber pacemaker, ARCHANA treated with CPAP.    Today in follow-up he reports overall doing well since his ablation.  Few episodes of arrhythmia/palpitations but has calmed for the last couple of weeks.    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    Pneumonia 2000    Sleep apnea     CPAP     Past Surgical History:   Procedure Laterality Date    OTHER CARDIAC SURGERY  12/20/16    pacemaker    OTHER ORTHOPEDIC SURGERY  6/1/1990    achilles tendon repair left foot     Family History   Problem Relation Age of Onset    Heart Disease Mother         atrial fib    Stroke Father 65        CVa    Diabetes Father     Cancer Sister         skin    No Known Problems Brother     Stroke Paternal Grandmother      Social History     Socioeconomic History    Marital status:      Spouse name: Not on file    Number of children: Not on file    Years of education: Not on file    Highest education level: Not on file   Occupational History    Not on file    Tobacco Use    Smoking status: Never    Smokeless tobacco: Never   Vaping Use    Vaping Use: Never used   Substance and Sexual Activity    Alcohol use: Not Currently    Drug use: No    Sexual activity: Not on file   Other Topics Concern    Not on file   Social History Narrative    Not on file     Social Determinants of Health     Financial Resource Strain: Not on file   Food Insecurity: Not on file   Transportation Needs: Not on file   Physical Activity: Not on file   Stress: Not on file   Social Connections: Not on file   Intimate Partner Violence: Not on file   Housing Stability: Not on file     Allergies   Allergen Reactions    Bloodless     Codeine Vomiting    Tramadol Vomiting and Nausea     Outpatient Encounter Medications as of 11/2/2023   Medication Sig Dispense Refill    colchicine (COLCRYS) 0.6 MG Tab Take 1 Tablet by mouth every day. 7 Tablet 0    sotalol (BETAPACE) 80 MG Tab Take 1 Tablet by mouth 2 times a day. 60 Tablet 3    XARELTO 20 MG Tab tablet TAKE 1 TABLET BY MOUTH EVERY DAY WITH DINNER (Patient taking differently: Take 20 mg by mouth with dinner.) 90 Tablet 2    allopurinol (ZYLOPRIM) 300 MG Tab Take 1 Tablet by mouth every day. 90 Tablet 3    Potassium (POTASSIMIN PO) Take 2 Tablets by mouth every day. OTC strength      Magnesium 500 MG Tab Take 500 mg by mouth every day.       No facility-administered encounter medications on file as of 11/2/2023.     Review of Systems   Constitutional:  Negative for chills, fever, malaise/fatigue and weight loss.   HENT:  Negative for tinnitus.    Respiratory:  Negative for cough, hemoptysis, sputum production, shortness of breath and wheezing.    Cardiovascular:  Negative for chest pain, palpitations, orthopnea, leg swelling and PND.   Gastrointestinal:  Negative for heartburn, nausea and vomiting.   Neurological:  Negative for dizziness, tingling, tremors, sensory change, speech change, focal weakness, loss of consciousness and headaches.   All other  "systems reviewed and are negative.         Objective     /80 (BP Location: Left arm, Patient Position: Sitting, BP Cuff Size: Adult)   Pulse 60   Resp 16   Ht 1.753 m (5' 9\")   Wt (!) 131 kg (288 lb)   SpO2 97%   BMI 42.53 kg/m²     Physical Exam  Constitutional:       Appearance: Normal appearance. He is well-developed.   HENT:      Head: Normocephalic and atraumatic.      Mouth/Throat:      Mouth: Mucous membranes are moist.      Pharynx: Oropharynx is clear.   Eyes:      Extraocular Movements: Extraocular movements intact.      Conjunctiva/sclera: Conjunctivae normal.      Pupils: Pupils are equal, round, and reactive to light.   Neck:      Vascular: No JVD.   Cardiovascular:      Rate and Rhythm: Normal rate and regular rhythm.      Pulses: Normal pulses.      Heart sounds: Normal heart sounds. No murmur heard.     No friction rub. No gallop.   Pulmonary:      Effort: Pulmonary effort is normal.      Breath sounds: Normal breath sounds. No wheezing or rales.   Chest:      Chest wall: No tenderness.      Comments: Chest pacemaker in situ without erythema or edema.  Musculoskeletal:         General: Normal range of motion.      Cervical back: Normal range of motion and neck supple.      Right lower leg: No edema.      Left lower leg: No edema.   Skin:     General: Skin is warm and dry.      Capillary Refill: Capillary refill takes 2 to 3 seconds.   Neurological:      Mental Status: He is alert and oriented to person, place, and time.   Psychiatric:         Mood and Affect: Mood normal.         Behavior: Behavior normal.         Thought Content: Thought content normal.         Judgment: Judgment normal.          Assessment & Plan     1. Atrial fibrillation, unspecified type (HCC)  EKG      2. Atypical atrial flutter (HCC)        3. S/P ablation of atrial fibrillation        4. Hypercoagulable state ([facility])        5. SSS (sick sinus syndrome) ([facility])        6. Cardiac pacemaker in situ        7. Encounter " for monitoring sotalol therapy        8. ARCHANA (obstructive sleep apnea)            Medical Decision Making: Today's Assessment/Status/Plan:   1.  Atrial fibrillation  2.  Atypical atrial flutter/atrial tach  3.  Status post ablation  - History of 4 total ablations.  Last with Dr. Faith 10/13/2023.  Procedure completed with isolation of atrial tachycardia from inferolateral JESSICA and ablation of mitral flutter with VOM ethanol ablation as well.  Previous ablations with posterior wall isolation, PVI, atypical flutter typical flutter.  -Device interrogation today reveals no episodes of atrial arrhythmia since October 19.  -Discussed to continue sotalol 80 mg twice daily.  To continue Xarelto 20 mg daily.    4.  Sick sinus syndrome  5.  Dual-chamber pacemaker  - Pacemaker interrogated in office today demonstrates appropriate function with stable lead parameters.  -Minimal percentage of RV pacing.  Battery longevity is approximately 1 year.  He has home monitor set up.    6.  High high risk medication use (sotalol)  - Twelve-lead EKG today reveals QTc of approximately 437 ms and overall stable.  - His labs are up-to-date  - Monitoring this high risk medication with biyearly lab and in office ECGs secondary to potential for proarrhythmia-QT prolongation.    7.  Anticoagulation  - Continue Xarelto long-term.    8.  ARCHANA  - Good compliance with CPAP.    Return to clinic in January as scheduled with Dr. Faith, sooner if clinical condition changes.  He will reach out to us via [employer] telephone call should questions or concerns arise.

## 2023-11-02 ENCOUNTER — OFFICE VISIT (OUTPATIENT)
Dept: CARDIOLOGY | Facility: MEDICAL CENTER | Age: 63
End: 2023-11-02
Attending: NURSE PRACTITIONER
Payer: MEDICARE

## 2023-11-02 VITALS
WEIGHT: 288 LBS | RESPIRATION RATE: 16 BRPM | SYSTOLIC BLOOD PRESSURE: 122 MMHG | HEART RATE: 60 BPM | BODY MASS INDEX: 42.65 KG/M2 | HEIGHT: 69 IN | OXYGEN SATURATION: 97 % | DIASTOLIC BLOOD PRESSURE: 80 MMHG

## 2023-11-02 DIAGNOSIS — Z95.0 CARDIAC PACEMAKER IN SITU: ICD-10-CM

## 2023-11-02 DIAGNOSIS — Z79.899 ENCOUNTER FOR MONITORING SOTALOL THERAPY: ICD-10-CM

## 2023-11-02 DIAGNOSIS — I48.91 ATRIAL FIBRILLATION, UNSPECIFIED TYPE (HCC): ICD-10-CM

## 2023-11-02 DIAGNOSIS — I49.5 SSS (SICK SINUS SYNDROME) (HCC): ICD-10-CM

## 2023-11-02 DIAGNOSIS — Z86.79 S/P ABLATION OF ATRIAL FIBRILLATION: ICD-10-CM

## 2023-11-02 DIAGNOSIS — Z51.81 ENCOUNTER FOR MONITORING SOTALOL THERAPY: ICD-10-CM

## 2023-11-02 DIAGNOSIS — Z98.890 S/P ABLATION OF ATRIAL FIBRILLATION: ICD-10-CM

## 2023-11-02 DIAGNOSIS — G47.33 OSA (OBSTRUCTIVE SLEEP APNEA): ICD-10-CM

## 2023-11-02 DIAGNOSIS — I48.4 ATYPICAL ATRIAL FLUTTER (HCC): ICD-10-CM

## 2023-11-02 DIAGNOSIS — D68.59 HYPERCOAGULABLE STATE (HCC): Chronic | ICD-10-CM

## 2023-11-02 PROCEDURE — 99213 OFFICE O/P EST LOW 20 MIN: CPT | Performed by: NURSE PRACTITIONER

## 2023-11-02 PROCEDURE — 3074F SYST BP LT 130 MM HG: CPT | Performed by: NURSE PRACTITIONER

## 2023-11-02 PROCEDURE — 99212 OFFICE O/P EST SF 10 MIN: CPT | Performed by: NURSE PRACTITIONER

## 2023-11-02 PROCEDURE — 93010 ELECTROCARDIOGRAM REPORT: CPT | Mod: 59 | Performed by: NURSE PRACTITIONER

## 2023-11-02 PROCEDURE — 93005 ELECTROCARDIOGRAM TRACING: CPT | Performed by: NURSE PRACTITIONER

## 2023-11-02 PROCEDURE — 99214 OFFICE O/P EST MOD 30 MIN: CPT | Mod: 25 | Performed by: NURSE PRACTITIONER

## 2023-11-02 PROCEDURE — 3079F DIAST BP 80-89 MM HG: CPT | Performed by: NURSE PRACTITIONER

## 2023-11-02 PROCEDURE — 93288 INTERROG EVL PM/LDLS PM IP: CPT | Performed by: NURSE PRACTITIONER

## 2023-11-02 PROCEDURE — 93288 INTERROG EVL PM/LDLS PM IP: CPT | Mod: 26 | Performed by: NURSE PRACTITIONER

## 2023-11-02 ASSESSMENT — ENCOUNTER SYMPTOMS
SPEECH CHANGE: 0
VOMITING: 0
TREMORS: 0
PALPITATIONS: 0
NAUSEA: 0
SPUTUM PRODUCTION: 0
TINGLING: 0
SHORTNESS OF BREATH: 0
CHILLS: 0
WHEEZING: 0
HEARTBURN: 0
FOCAL WEAKNESS: 0
DIZZINESS: 0
COUGH: 0
HEMOPTYSIS: 0
FEVER: 0
SENSORY CHANGE: 0
LOSS OF CONSCIOUSNESS: 0
HEADACHES: 0
ORTHOPNEA: 0
PND: 0
WEIGHT LOSS: 0

## 2023-11-02 ASSESSMENT — FIBROSIS 4 INDEX: FIB4 SCORE: 0.83

## 2023-11-09 ENCOUNTER — TELEPHONE (OUTPATIENT)
Dept: HEALTH INFORMATION MANAGEMENT | Facility: OTHER | Age: 63
End: 2023-11-09

## 2023-11-14 ENCOUNTER — NON-PROVIDER VISIT (OUTPATIENT)
Dept: CARDIOLOGY | Facility: MEDICAL CENTER | Age: 63
End: 2023-11-14
Payer: MEDICARE

## 2023-11-14 PROCEDURE — 93294 REM INTERROG EVL PM/LDLS PM: CPT | Performed by: INTERNAL MEDICINE

## 2023-11-14 NOTE — CARDIAC REMOTE MONITOR - SCAN
Device transmission reviewed. Device demonstrated appropriate function.       Electronically Signed by: Giovani Pena M.D.    11/14/2023  10:42 AM

## 2023-11-28 NOTE — DOCUMENTATION QUERY
[facility]                                                                       Query Response Note      PATIENT:               CINTHIA JHA  ACCT #:                  [id number]  MRN:                     [id number]  :                      1960  ADMIT DATE:       2023 7:37 AM  DISCH DATE:        2023 8:23 AM  RESPONDING  PROVIDER #:        [id number]           QUERY TEXT:    Was Atypical Atrial Flutter Managed, Assessed, Evaluated or Treated at this encounter?    Based on clinical indicators and treatment please clarify the clinical significance of Atypical Atrial Flutter.    **Note: If the appropriate response is not listed below, please respond with a new note.    Nate Schober, CCA [email or web address]            The patient's Clinical Indicators include:  Clinical Indicators: Patient presents for follow-up after ablation for Atrial Flutter and atrial tachycardia.  Atypical Atrial Flutter is listed as a diagnosis in the Assessment.    Treatment: There is no Management, Assessment, Evaluation or treatment of Atrial flutter in the record outside the previous ablation.    Risk Factors: N/A  Options provided:   -- Atypical Atrial Flutter is clinically significant   -- Atypical Atrial Flutter is not clinically significant   -- Other explanation, (please specify other explanation)   -- Unable to determine      Query created by: Schober, Nate on 2023 1:03 PM    RESPONSE TEXT:    Atypical Atrial Flutter is clinically significant          Electronically signed by:  KEVIN COSBY 2023 6:58 AM

## 2023-12-22 LAB — EKG IMPRESSION: NORMAL

## 2023-12-24 DIAGNOSIS — I48.4 ATYPICAL ATRIAL FLUTTER (HCC): ICD-10-CM

## 2023-12-26 RX ORDER — SOTALOL HYDROCHLORIDE 80 MG/1
80 TABLET ORAL 2 TIMES DAILY
Qty: 180 TABLET | Refills: 1 | Status: SHIPPED | OUTPATIENT
Start: 2023-12-26

## 2023-12-31 DIAGNOSIS — Z12.5 SCREENING PSA (PROSTATE SPECIFIC ANTIGEN): ICD-10-CM

## 2023-12-31 DIAGNOSIS — E78.00 PURE HYPERCHOLESTEROLEMIA: ICD-10-CM

## 2023-12-31 DIAGNOSIS — M1A.0710 IDIOPATHIC CHRONIC GOUT OF RIGHT FOOT WITHOUT TOPHUS: ICD-10-CM

## 2023-12-31 DIAGNOSIS — R73.03 PREDIABETES: ICD-10-CM

## 2023-12-31 DIAGNOSIS — Z13.29 SCREENING FOR THYROID DISORDER: ICD-10-CM

## 2023-12-31 DIAGNOSIS — E55.9 VITAMIN D DEFICIENCY: ICD-10-CM

## 2024-01-02 RX ORDER — ALLOPURINOL 300 MG/1
300 TABLET ORAL
Qty: 90 TABLET | Refills: 0 | Status: SHIPPED | OUTPATIENT
Start: 2024-01-02

## 2024-01-02 NOTE — TELEPHONE ENCOUNTER
.Refill done. Patient is due for annual appointment and fasting labs, ordered. Please call and schedule patient.  SUE Fisher.

## 2024-01-18 ENCOUNTER — OFFICE VISIT (OUTPATIENT)
Dept: CARDIOLOGY | Facility: MEDICAL CENTER | Age: 64
End: 2024-01-18
Attending: INTERNAL MEDICINE
Payer: MEDICARE

## 2024-01-18 VITALS
BODY MASS INDEX: 43.69 KG/M2 | RESPIRATION RATE: 18 BRPM | WEIGHT: 295 LBS | HEART RATE: 62 BPM | SYSTOLIC BLOOD PRESSURE: 130 MMHG | HEIGHT: 69 IN | DIASTOLIC BLOOD PRESSURE: 86 MMHG | OXYGEN SATURATION: 96 %

## 2024-01-18 DIAGNOSIS — Z95.0 CARDIAC PACEMAKER IN SITU: ICD-10-CM

## 2024-01-18 DIAGNOSIS — I48.91 ATRIAL FIBRILLATION, UNSPECIFIED TYPE (HCC): ICD-10-CM

## 2024-01-18 PROCEDURE — 3079F DIAST BP 80-89 MM HG: CPT | Performed by: INTERNAL MEDICINE

## 2024-01-18 PROCEDURE — 99214 OFFICE O/P EST MOD 30 MIN: CPT | Mod: 25 | Performed by: INTERNAL MEDICINE

## 2024-01-18 PROCEDURE — 93005 ELECTROCARDIOGRAM TRACING: CPT | Performed by: INTERNAL MEDICINE

## 2024-01-18 PROCEDURE — 93280 PM DEVICE PROGR EVAL DUAL: CPT | Mod: 26 | Performed by: INTERNAL MEDICINE

## 2024-01-18 PROCEDURE — 93280 PM DEVICE PROGR EVAL DUAL: CPT | Performed by: INTERNAL MEDICINE

## 2024-01-18 PROCEDURE — 99212 OFFICE O/P EST SF 10 MIN: CPT | Performed by: INTERNAL MEDICINE

## 2024-01-18 PROCEDURE — 3075F SYST BP GE 130 - 139MM HG: CPT | Performed by: INTERNAL MEDICINE

## 2024-01-18 ASSESSMENT — FIBROSIS 4 INDEX: FIB4 SCORE: 0.83

## 2024-01-24 LAB — EKG IMPRESSION: NORMAL

## 2024-01-24 PROCEDURE — 93010 ELECTROCARDIOGRAM REPORT: CPT | Performed by: INTERNAL MEDICINE

## 2024-02-19 ENCOUNTER — NON-PROVIDER VISIT (OUTPATIENT)
Dept: CARDIOLOGY | Facility: MEDICAL CENTER | Age: 64
End: 2024-02-19
Payer: MEDICARE

## 2024-02-19 PROCEDURE — 93294 REM INTERROG EVL PM/LDLS PM: CPT | Performed by: INTERNAL MEDICINE

## 2024-03-30 DIAGNOSIS — M1A.0710 IDIOPATHIC CHRONIC GOUT OF RIGHT FOOT WITHOUT TOPHUS: ICD-10-CM

## 2024-04-01 RX ORDER — ALLOPURINOL 300 MG/1
300 TABLET ORAL
Qty: 90 TABLET | Refills: 0 | Status: SHIPPED | OUTPATIENT
Start: 2024-04-01

## 2024-04-01 NOTE — TELEPHONE ENCOUNTER
Received request via: Pharmacy    Was the patient seen in the last year in this department? Yes    Does the patient have an active prescription (recently filled or refills available) for medication(s) requested? No    [facility]    Does the patient have skilled nursing Plus and need 100 day supply (blood pressure, diabetes and cholesterol meds only)? Patient does not have SCP

## 2024-04-02 NOTE — TELEPHONE ENCOUNTER
Refill done. Patient is due for annual appointment and labs. Please call and schedule patient.  SUE Fisher.     [NS_AttendInformed_OBGYN_ALL_OB:[id number]=]

## 2024-05-20 ENCOUNTER — NON-PROVIDER VISIT (OUTPATIENT)
Dept: CARDIOLOGY | Facility: MEDICAL CENTER | Age: 64
End: 2024-05-20
Payer: MEDICARE

## 2024-05-20 PROCEDURE — 93294 REM INTERROG EVL PM/LDLS PM: CPT | Performed by: INTERNAL MEDICINE

## 2024-06-21 DIAGNOSIS — I48.4 ATYPICAL ATRIAL FLUTTER (HCC): ICD-10-CM

## 2024-06-21 RX ORDER — SOTALOL HYDROCHLORIDE 80 MG/1
80 TABLET ORAL 2 TIMES DAILY
Qty: 180 TABLET | Refills: 0 | Status: SHIPPED | OUTPATIENT
Start: 2024-06-21

## 2024-07-17 DIAGNOSIS — M1A.0710 IDIOPATHIC CHRONIC GOUT OF RIGHT FOOT WITHOUT TOPHUS: ICD-10-CM

## 2024-07-19 ENCOUNTER — OFFICE VISIT (OUTPATIENT)
Dept: CARDIOLOGY | Facility: MEDICAL CENTER | Age: 64
End: 2024-07-19
Attending: INTERNAL MEDICINE
Payer: MEDICARE

## 2024-07-19 VITALS
HEIGHT: 69 IN | HEART RATE: 63 BPM | OXYGEN SATURATION: 93 % | SYSTOLIC BLOOD PRESSURE: 108 MMHG | RESPIRATION RATE: 12 BRPM | BODY MASS INDEX: 43.69 KG/M2 | WEIGHT: 295 LBS | DIASTOLIC BLOOD PRESSURE: 66 MMHG

## 2024-07-19 DIAGNOSIS — Z45.010 PACEMAKER AT END OF BATTERY LIFE: ICD-10-CM

## 2024-07-19 DIAGNOSIS — I48.4 ATYPICAL ATRIAL FLUTTER (HCC): ICD-10-CM

## 2024-07-19 DIAGNOSIS — I48.91 ATRIAL FIBRILLATION, UNSPECIFIED TYPE (HCC): ICD-10-CM

## 2024-07-19 LAB — EKG IMPRESSION: NORMAL

## 2024-07-19 PROCEDURE — 93280 PM DEVICE PROGR EVAL DUAL: CPT | Performed by: INTERNAL MEDICINE

## 2024-07-19 PROCEDURE — 3074F SYST BP LT 130 MM HG: CPT | Performed by: INTERNAL MEDICINE

## 2024-07-19 PROCEDURE — 3078F DIAST BP <80 MM HG: CPT | Performed by: INTERNAL MEDICINE

## 2024-07-19 PROCEDURE — 99214 OFFICE O/P EST MOD 30 MIN: CPT | Mod: 25 | Performed by: INTERNAL MEDICINE

## 2024-07-19 PROCEDURE — 99212 OFFICE O/P EST SF 10 MIN: CPT | Performed by: INTERNAL MEDICINE

## 2024-07-19 PROCEDURE — 93280 PM DEVICE PROGR EVAL DUAL: CPT | Mod: 26 | Performed by: INTERNAL MEDICINE

## 2024-07-19 PROCEDURE — 93005 ELECTROCARDIOGRAM TRACING: CPT | Performed by: INTERNAL MEDICINE

## 2024-07-19 RX ORDER — AMOXICILLIN 500 MG/1
500 CAPSULE ORAL 3 TIMES DAILY
COMMUNITY
Start: 2024-07-15

## 2024-07-19 RX ORDER — ALLOPURINOL 300 MG/1
300 TABLET ORAL
Qty: 90 TABLET | Refills: 0 | OUTPATIENT
Start: 2024-07-19

## 2024-07-19 ASSESSMENT — FIBROSIS 4 INDEX: FIB4 SCORE: 0.84

## 2024-07-22 ENCOUNTER — TELEPHONE (OUTPATIENT)
Dept: CARDIOLOGY | Facility: MEDICAL CENTER | Age: 64
End: 2024-07-22
Payer: MEDICARE

## 2024-08-11 ENCOUNTER — NON-PROVIDER VISIT (OUTPATIENT)
Dept: CARDIOLOGY | Facility: MEDICAL CENTER | Age: 64
End: 2024-08-11
Payer: MEDICARE

## 2024-08-12 ENCOUNTER — NON-PROVIDER VISIT (OUTPATIENT)
Dept: CARDIOLOGY | Facility: MEDICAL CENTER | Age: 64
End: 2024-08-12
Payer: MEDICARE

## 2024-08-12 ENCOUNTER — TELEPHONE (OUTPATIENT)
Dept: CARDIOLOGY | Facility: MEDICAL CENTER | Age: 64
End: 2024-08-12
Payer: MEDICARE

## 2024-08-12 DIAGNOSIS — I48.91 ATRIAL FIBRILLATION, UNSPECIFIED TYPE (HCC): ICD-10-CM

## 2024-08-12 NOTE — TELEPHONE ENCOUNTER
"Phone Number Called: [phone]    Call outcome: Spoke to patient regarding message below.    Message: Called to discuss remote transmission. Patient reported he was on vacation and was in the heat a lot, probably dehydrated some days.   Patient reporting feeling a little more fatigue. He currently has stopped taking Xarelto per SS OV note \"-Can stop xarelto with low CHADSVasc \"  Discussed with patient I will reach out to SS to advise, patient verbalized understanding.     "

## 2024-08-12 NOTE — CARDIAC REMOTE MONITOR - SCAN
Device transmission reviewed. Device demonstrated appropriate function.       Electronically Signed by: Giovani Pena M.D.    8/13/2024  11:47 AM

## 2024-08-12 NOTE — TELEPHONE ENCOUNTER
Remote Transmission 8/11/2024:    Pt presenting in persistent AFL, episode began 8/11/2024@3:20 AM.    24 episode of RVR since AFL episode began.     Pt is on Xarelto. Pt is s/p AF/AFL Ablation 10/13/2023.    Full report scanned into media.

## 2024-08-13 ENCOUNTER — NON-PROVIDER VISIT (OUTPATIENT)
Dept: CARDIOLOGY | Facility: MEDICAL CENTER | Age: 64
End: 2024-08-13
Payer: MEDICARE

## 2024-08-14 NOTE — TELEPHONE ENCOUNTER
S/w patient regarding SS recommendations of restarting Xarelto. Patient verbalized understanding, new RX sent.

## 2024-08-15 ENCOUNTER — TELEPHONE (OUTPATIENT)
Dept: CARDIOLOGY | Facility: MEDICAL CENTER | Age: 64
End: 2024-08-15
Payer: MEDICARE

## 2024-08-15 NOTE — TELEPHONE ENCOUNTER
Received Refill Erx request via [facility] for Xarelto 20mg tabs. (Quantity:90, Day Supply:90)     Insurance: Odette ULRICH      Ran Test claim via [employer] & co-pays are as followed:    $247 for 30 day supply and $341 for 90 day supply     This is due to the patient having a $200 deductible for his pharmacy insurance that has to be met before the patient's co-pays decrease.     Contacting the patient to offer assistance with deductible next month and a discounted co-pay through [employer] for this month. The funds for helping with deductibles with [email or web address] are exhausted for August, and we have some funds remaining for September to assist.

## 2024-08-16 LAB — EKG IMPRESSION: NORMAL

## 2024-08-19 ENCOUNTER — TELEPHONE (OUTPATIENT)
Dept: CARDIOLOGY | Facility: MEDICAL CENTER | Age: 64
End: 2024-08-19
Payer: MEDICARE

## 2024-08-19 ENCOUNTER — NON-PROVIDER VISIT (OUTPATIENT)
Dept: CARDIOLOGY | Facility: MEDICAL CENTER | Age: 64
End: 2024-08-19
Payer: MEDICARE

## 2024-08-19 DIAGNOSIS — I48.91 ATRIAL FIBRILLATION, UNSPECIFIED TYPE (HCC): ICD-10-CM

## 2024-08-19 NOTE — TELEPHONE ENCOUNTER
Phone Number Called: [phone]    Call outcome: Spoke to patient regarding message below.    Message: Called to discuss remote transmission.  Patient reporting having SOB on occasions. No other symptoms.  Taking all medications.   No medication changes.   Last ablation 10/13/24  BP - No recent BP's  HR - 90's reported from [employer]  No triggers reported.   ER precautions give for inc hr sustaining 140+, sob at rest, syncope, worsening/intolerable symptoms such as lightheadedness, dizziness, sob, weakness etc.   Patient verbalized understanding.

## 2024-08-19 NOTE — TELEPHONE ENCOUNTER
Remote transmission received via home monitor, full report scanned into Media.     Patient presenting in persistent AF/AFL  -Onset: 8/11/2024 @ 9:55 PM  -Average V Rate: 90 bpm  -Patient on OAC? Yes

## 2024-08-21 ENCOUNTER — PATIENT MESSAGE (OUTPATIENT)
Dept: CARDIOLOGY | Facility: MEDICAL CENTER | Age: 64
End: 2024-08-21
Payer: MEDICARE

## 2024-08-21 NOTE — CARDIAC REMOTE MONITOR - SCAN
Device transmission reviewed. Device demonstrated appropriate function.       Electronically Signed by: Giovani Pena M.D.    8/26/2024  12:17 PM

## 2024-08-22 ENCOUNTER — ANESTHESIA EVENT (OUTPATIENT)
Dept: CARDIOLOGY | Facility: MEDICAL CENTER | Age: 64
End: 2024-08-22
Payer: MEDICARE

## 2024-08-22 NOTE — TELEPHONE ENCOUNTER
Alexi Fatih M.D.  You14 hours ago (4:23 PM)       Yeah let's schedule cardioversion as he is still in it

## 2024-08-22 NOTE — TELEPHONE ENCOUNTER
Patient is scheduled for a Cardioversion with anesthesia on 08- with Dr. Valente. Patient has been instructed to check in at 6:00 am for 8:00 procedure. No meds to hold. Patient has been advised they will need a  home and with them after since they are sedated. Message sent to authorizations. Sent [employer] message to pt with instructions. Juan M at [place] notified of case. TERRY sent to Richard

## 2024-08-23 ENCOUNTER — ANESTHESIA (OUTPATIENT)
Dept: CARDIOLOGY | Facility: MEDICAL CENTER | Age: 64
End: 2024-08-23
Payer: MEDICARE

## 2024-08-23 ENCOUNTER — APPOINTMENT (OUTPATIENT)
Dept: CARDIOLOGY | Facility: MEDICAL CENTER | Age: 64
End: 2024-08-23
Attending: INTERNAL MEDICINE
Payer: MEDICARE

## 2024-08-23 ENCOUNTER — HOSPITAL ENCOUNTER (OUTPATIENT)
Facility: MEDICAL CENTER | Age: 64
End: 2024-08-23
Attending: INTERNAL MEDICINE | Admitting: INTERNAL MEDICINE
Payer: MEDICARE

## 2024-08-23 VITALS
HEIGHT: 69 IN | BODY MASS INDEX: 42.19 KG/M2 | HEART RATE: 60 BPM | TEMPERATURE: 97.5 F | WEIGHT: 284.83 LBS | RESPIRATION RATE: 16 BRPM | SYSTOLIC BLOOD PRESSURE: 113 MMHG | DIASTOLIC BLOOD PRESSURE: 76 MMHG | OXYGEN SATURATION: 94 %

## 2024-08-23 DIAGNOSIS — I48.91 ATRIAL FIBRILLATION, UNSPECIFIED TYPE (HCC): ICD-10-CM

## 2024-08-23 LAB
ALBUMIN SERPL BCP-MCNC: 3.8 G/DL (ref 3.2–4.9)
ALBUMIN/GLOB SERPL: 1.4 G/DL
ALP SERPL-CCNC: 85 U/L (ref 30–99)
ALT SERPL-CCNC: 105 U/L (ref 2–50)
ANION GAP SERPL CALC-SCNC: 8 MMOL/L (ref 7–16)
AST SERPL-CCNC: 60 U/L (ref 12–45)
BILIRUB SERPL-MCNC: 0.6 MG/DL (ref 0.1–1.5)
BUN SERPL-MCNC: 13 MG/DL (ref 8–22)
CALCIUM ALBUM COR SERPL-MCNC: 8.7 MG/DL (ref 8.5–10.5)
CALCIUM SERPL-MCNC: 8.5 MG/DL (ref 8.5–10.5)
CHLORIDE SERPL-SCNC: 106 MMOL/L (ref 96–112)
CO2 SERPL-SCNC: 21 MMOL/L (ref 20–33)
CREAT SERPL-MCNC: 0.76 MG/DL (ref 0.5–1.4)
EKG IMPRESSION: NORMAL
EKG IMPRESSION: NORMAL
ERYTHROCYTE [DISTWIDTH] IN BLOOD BY AUTOMATED COUNT: 44 FL (ref 35.9–50)
GFR SERPLBLD CREATININE-BSD FMLA CKD-EPI: 99 ML/MIN/1.73 M 2
GLOBULIN SER CALC-MCNC: 2.8 G/DL (ref 1.9–3.5)
GLUCOSE SERPL-MCNC: 116 MG/DL (ref 65–99)
HCT VFR BLD AUTO: 45.3 % (ref 42–52)
HGB BLD-MCNC: 15.2 G/DL (ref 14–18)
INR PPP: 1.56 (ref 0.87–1.13)
MCH RBC QN AUTO: 30 PG (ref 27–33)
MCHC RBC AUTO-ENTMCNC: 33.6 G/DL (ref 32.3–36.5)
MCV RBC AUTO: 89.3 FL (ref 81.4–97.8)
PLATELET # BLD AUTO: 178 K/UL (ref 164–446)
PMV BLD AUTO: 10 FL (ref 9–12.9)
POTASSIUM SERPL-SCNC: 4.2 MMOL/L (ref 3.6–5.5)
PROT SERPL-MCNC: 6.6 G/DL (ref 6–8.2)
PROTHROMBIN TIME: 18.9 SEC (ref 12–14.6)
RBC # BLD AUTO: 5.07 M/UL (ref 4.7–6.1)
SODIUM SERPL-SCNC: 135 MMOL/L (ref 135–145)
WBC # BLD AUTO: 7.8 K/UL (ref 4.8–10.8)

## 2024-08-23 PROCEDURE — 160046 HCHG PACU - 1ST 60 MINS PHASE II

## 2024-08-23 PROCEDURE — 85610 PROTHROMBIN TIME: CPT

## 2024-08-23 PROCEDURE — 80053 COMPREHEN METABOLIC PANEL: CPT

## 2024-08-23 PROCEDURE — 93005 ELECTROCARDIOGRAM TRACING: CPT | Performed by: INTERNAL MEDICINE

## 2024-08-23 PROCEDURE — 85027 COMPLETE CBC AUTOMATED: CPT

## 2024-08-23 PROCEDURE — 4410588 CL-CARDIOVERSION

## 2024-08-23 PROCEDURE — 92960 CARDIOVERSION ELECTRIC EXT: CPT | Performed by: INTERNAL MEDICINE

## 2024-08-23 PROCEDURE — 700111 HCHG RX REV CODE 636 W/ 250 OVERRIDE (IP): Performed by: STUDENT IN AN ORGANIZED HEALTH CARE EDUCATION/TRAINING PROGRAM

## 2024-08-23 PROCEDURE — 160035 HCHG PACU - 1ST 60 MINS PHASE I

## 2024-08-23 PROCEDURE — 160002 HCHG RECOVERY MINUTES (STAT)

## 2024-08-23 RX ORDER — SODIUM CHLORIDE, SODIUM LACTATE, POTASSIUM CHLORIDE, CALCIUM CHLORIDE 600; 310; 30; 20 MG/100ML; MG/100ML; MG/100ML; MG/100ML
INJECTION, SOLUTION INTRAVENOUS CONTINUOUS
Status: DISCONTINUED | OUTPATIENT
Start: 2024-08-23 | End: 2024-08-23 | Stop reason: HOSPADM

## 2024-08-23 RX ORDER — MIDAZOLAM HYDROCHLORIDE 1 MG/ML
INJECTION INTRAMUSCULAR; INTRAVENOUS PRN
Status: DISCONTINUED | OUTPATIENT
Start: 2024-08-23 | End: 2024-08-23 | Stop reason: SURG

## 2024-08-23 RX ADMIN — PROPOFOL 200 MG: 10 INJECTION, EMULSION INTRAVENOUS at 08:21

## 2024-08-23 RX ADMIN — MIDAZOLAM HYDROCHLORIDE 2 MG: 2 INJECTION, SOLUTION INTRAMUSCULAR; INTRAVENOUS at 08:21

## 2024-08-23 ASSESSMENT — PAIN DESCRIPTION - PAIN TYPE
TYPE: SURGICAL PAIN

## 2024-08-23 ASSESSMENT — FIBROSIS 4 INDEX: FIB4 SCORE: 0.84

## 2024-08-23 ASSESSMENT — PAIN SCALES - GENERAL: PAIN_LEVEL: 0

## 2024-08-23 NOTE — ANESTHESIA POSTPROCEDURE EVALUATION
Patient: Juan Martel    Procedure Summary       Date: 08/23/24 Room / Location: [facility] - Echocardiology [facility]    Anesthesia Start: 0814 Anesthesia Stop: 0830    Procedure: CL-CARDIOVERSION Diagnosis:       Atrial fibrillation, unspecified type (HCC)      (See Associated Dx)    Scheduled Providers: Eduardo Valente M.D.; Eduardo Thompson M.D. Responsible Provider:     Anesthesia Type: general ASA Status: 3            Final Anesthesia Type: general  Last vitals  BP   Blood Pressure : 98/60    Temp   37.4 °C (99.3 °F)    Pulse   60   Resp   15    SpO2   92 %      Anesthesia Post Evaluation    Patient location during evaluation: PACU  Patient participation: complete - patient participated  Level of consciousness: awake and alert  Pain score: 0    Airway patency: patent  Anesthetic complications: no  Cardiovascular status: hemodynamically stable  Respiratory status: acceptable  Hydration status: euvolemic    PONV: none          No notable events documented.     Nurse Pain Score: 0 (NPRS)

## 2024-08-23 NOTE — ANESTHESIA TIME REPORT
Anesthesia Start and Stop Event Times       Date Time Event    8/23/2024 0745 Ready for Procedure     0814 Anesthesia Start     0830 Anesthesia Stop          Responsible Staff  08/23/24      Name Role Begin End    Eduardo Thompson M.D. Anesth 0814 0830          Overtime Reason:  no overtime (within assigned shift)    Comments:

## 2024-08-23 NOTE — ADDENDUM NOTE
Addendum  created 08/23/24 1416 by Eduardo Thompson M.D.    Flowsheet accepted, Intraprocedure Flowsheets edited

## 2024-08-23 NOTE — OR NURSING
0833 Arrived from cath lab ID verified. Report received. Attached to monitors. Patient sleep 10L 02 mask respirations even and unlabored. Vss.     0922 Post EKG completed.    0933 discharge instructions given to patient and family verbalize understanding of the orders. Copy of instructions given to patient.     0939 Escorted via w/c to responsible adult with all personal belongings.

## 2024-08-23 NOTE — PROCEDURES
Procedure performed: External Direct Current Cardioversion    : Eduardo Valente MD PhD FACC    Assistant: None    Anesthesia: per Anesthesia services    Indication: Atrial Flutter    Preprocedural Diagnosis:   Atrial Flutter  Postprocedural Diagnosis: Sinus Rhythm      Description of procedure:  Mr. Martel was brought to the pre/post procedure area of the cath lab. Informed consent was obtained. Defibrillator pads were placed in the anterior and posterior position.  A TIME-OUT was performed. Adequate sedation was obtained with assistance of anesthesia. The patient was successfully cardioverted with  200 J synchronized biphasic energy into sinus rhythm confirmed by his [employer] Pacemaker. He was monitored in the recovery area until criteria met.    Conclusion: Successful DC cardioversion    Complications: None apparent      Electronically signed: Eduardo Valente MD PhD FACC  Cardiologist [facility]

## 2024-08-23 NOTE — ANESTHESIA PREPROCEDURE EVALUATION
Date/Time: 08/23/24 0800    Scheduled providers: Eduardo Valente M.D.; Eduardo Thompson M.D.    Procedure: CL-CARDIOVERSION    Diagnosis: Atrial fibrillation, unspecified type (HCC) [I48.91]    Indications: See Associated Dx    Location: [facility] - Echocardiology [facility]            Relevant Problems   ANESTHESIA   (positive) ARCHANA (obstructive sleep apnea)   (positive) PONV (postoperative nausea and vomiting)      CARDIAC   (positive) Atrial flutter (HCC)   (positive) Cardiac pacemaker in situ   (positive) Other persistent atrial fibrillation (HCC)   (positive) SSS (sick sinus syndrome) (HCC)      Other   (positive) Idiopathic chronic gout of right foot without tophus       Physical Exam    Airway   Mallampati: II  TM distance: >3 FB  Neck ROM: full       Cardiovascular - normal exam  Rhythm: regular  Rate: normal     Dental     Unable to assess dental       Pulmonary - normal exam  Breath sounds clear to auscultation     Abdominal    Neurological - normal exam                   Anesthesia Plan    ASA 3       Plan - general       Airway plan will be natural airway          Induction: intravenous    Postoperative Plan: Postoperative administration of opioids is intended.    Pertinent diagnostic labs and testing reviewed    Informed Consent:    Anesthetic plan and risks discussed with patient.    Use of blood products discussed with: patient whom consented to blood products.

## 2024-08-23 NOTE — CONSULTS
Patient's PCP: ALEX Fisher    CC: here for cardioversion      HPI: Mr Martel noted pulse was as high as 200 hbnis remote check showed flutter    Medications / Drug list prior to admission:  No current facility-administered medications on file prior to encounter.     Current Outpatient Medications on File Prior to Encounter   Medication Sig Dispense Refill    [START ON 9/4/2024] rivaroxaban (XARELTO) 20 MG Tab tablet Take 1 Tablet by mouth with dinner. 90 Tablet 3    sotalol (BETAPACE) 80 MG Tab TAKE 1 TABLET BY MOUTH TWICE A  Tablet 0    allopurinol (ZYLOPRIM) 300 MG Tab Take 1 Tablet by mouth every day. LAST refill, due for annual follow up with PCP for further refills 90 Tablet 0    Potassium (POTASSIMIN PO) Take 2 Tablets by mouth every day. OTC strength      Magnesium 500 MG Tab Take 500 mg by mouth every day.         Current list of administered Medications:    Current Facility-Administered Medications:     lactated ringers infusion, , Intravenous, Continuous, Marshal Mccloud M.D.    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    Pneumonia 2000    Sleep apnea     CPAP       Past Surgical History:   Procedure Laterality Date    OTHER CARDIAC SURGERY  12/20/16    pacemaker    OTHER ORTHOPEDIC SURGERY  6/1/1990    achilles tendon repair left foot       Family History   Problem Relation Age of Onset    Heart Disease Mother         atrial fib    Stroke Father 65        CVa    Diabetes Father     Cancer Sister         skin    No Known Problems Brother     Stroke Paternal Grandmother      Patient family history was personally reviewed, no pertinent family history to current presentation    Social  "History     Tobacco Use    Smoking status: Never    Smokeless tobacco: Never   Vaping Use    Vaping status: Never Used   Substance Use Topics    Alcohol use: Yes     Alcohol/week: 0.6 oz     Types: 1 Shots of liquor per week     Comment: one weekly    Drug use: No       ALLERGIES:  Allergies   Allergen Reactions    Bloodless     Codeine Vomiting    Tramadol Vomiting and Nausea       Review of systems:  A complete review of symptoms was reviewed with patient. This is reviewed in H&P and PMH. ALL OTHERS reviewed and negative    Physical exam:  Patient Vitals for the past 24 hrs:   BP Temp Temp src Pulse Resp SpO2 Height Weight   24 0636 118/81 36 °C (96.8 °F) Temporal 98 16 93 % 1.753 m (5' 9\") (!) 129 kg (284 lb 13.4 oz)     General: No acute distress.   EYES: no jaundice  HEENT: OP clear   Neck:  No JVD.   CVS:  [  RRR.   Resp: Normal respiratory effort,   Abdomen: ND,  Skin: Grossly nothing acute no obvious rashes  Neurological: Alert, Moves all extremities  Extremities:   [   no edema. No cyanosis.       Data:  Laboratory studies personally reviewed by me:  Recent Results (from the past 24 hour(s))   ECG    Collection Time: 24  7:01 AM   Result Value Ref Range    Report       [facility]    Test Date:  2024  Pt Name:    CINTHIA JHA    Department: [facility]  MRN:        [id number]                      Room:       [facility]  Gender:     Male                         Technician: FAITH  :        1960                   Requested By:JOSÉ MARSHALL  Order #:    [id number]                    Reading MD:    Measurements  Intervals                                Axis  Rate:       96                           P:          50  SC:         116                          QRS:        -64  QRSD:       105                          T:          -1  QT:         363  QTc:        459    Interpretive Statements  Sinus rhythm  Borderline short SC interval  Left anterior fascicular block  Abnormal R-wave " progression, late transition  Compared to ECG 07/19/2024 14:39:13  No significant changes     Prothrombin Time (INR) (plasma)    Collection Time: 08/23/24  7:20 AM   Result Value Ref Range    PT 18.9 (H) 12.0 - 14.6 sec    INR 1.56 (H) 0.87 - 1.13   Comp Metabolic Panel    Collection Time: 08/23/24  7:20 AM   Result Value Ref Range    Sodium 135 135 - 145 mmol/L    Potassium 4.2 3.6 - 5.5 mmol/L    Chloride 106 96 - 112 mmol/L    Co2 21 20 - 33 mmol/L    Anion Gap 8.0 7.0 - 16.0    Glucose 116 (H) 65 - 99 mg/dL    Bun 13 8 - 22 mg/dL    Creatinine 0.76 0.50 - 1.40 mg/dL    Calcium 8.5 8.5 - 10.5 mg/dL    Correct Calcium 8.7 8.5 - 10.5 mg/dL    AST(SGOT) 60 (H) 12 - 45 U/L    ALT(SGPT) 105 (H) 2 - 50 U/L    Alkaline Phosphatase 85 30 - 99 U/L    Total Bilirubin 0.6 0.1 - 1.5 mg/dL    Albumin 3.8 3.2 - 4.9 g/dL    Total Protein 6.6 6.0 - 8.2 g/dL    Globulin 2.8 1.9 - 3.5 g/dL    A-G Ratio 1.4 g/dL   CBC Without Differential    Collection Time: 08/23/24  7:20 AM   Result Value Ref Range    WBC 7.8 4.8 - 10.8 K/uL    RBC 5.07 4.70 - 6.10 M/uL    Hemoglobin 15.2 14.0 - 18.0 g/dL    Hematocrit 45.3 42.0 - 52.0 %    MCV 89.3 81.4 - 97.8 fL    MCH 30.0 27.0 - 33.0 pg    MCHC 33.6 32.3 - 36.5 g/dL    RDW 44.0 35.9 - 50.0 fL    Platelet Count 178 164 - 446 K/uL    MPV 10.0 9.0 - 12.9 fL   ESTIMATED GFR    Collection Time: 08/23/24  7:20 AM   Result Value Ref Range    GFR (CKD-EPI) 99 >60 mL/min/1.73 m 2       Imaging:  CL-CARDIOVERSION    (Results Pending)           EKG tracings personally reviewed by me  atrial flutter        All pertinent features of laboratory and imaging reviewed including primary images where applicable      Active Problems:    Atrial flutter (HCC) (POA: Yes)  Resolved Problems:    * No resolved hospital problems. *      Assessment / Plan:  The risks, benefits, and alternatives to electrical cardioversion were discussed in great detail. We discussed that conversion of atrial fibrillation to normal  rhythm, at least transiently, is successful in 90 to 95% of patients. However, maintaining a normal rhythm depends on a number of factors, including underlying heart disease and antiarrhythmic medications. Atrial fibrillation often recurs with time and other treatments may be necessary. Risks of cardioversion are low as long as anticoagulation issues are handled appropriately. There is a small (less than 1%) risk of embolic events, including stroke. Risks of electrical shock include mild muscle soreness and mild skin burning at the site of electrode placement. There is also a risk that cardioversion can stimulate more dangerous arrhythmias. The patient verbalized understanding of these potential complications and wishes to proceed with this procedure.         I personally discussed his case with  AYDIN Butcher.*    Future Appointments   Date Time Provider Department Center   9/23/2024  7:45 AM Rosie Lindsey P.A.-C. [facility] None   9/30/2024  7:30 AM [facility] CATH LAB 3 CLOT None       It is my pleasure to participate in the care of Mr. Martel.  Please do not hesitate to contact me with questions or concerns.    Eduardo Valente MD PhD [facility]  Cardiologist [facility]    8/23/2024    Please note that this dictation was created using voice recognition software. There may be errors I did not discover before finalizing the note.

## 2024-08-23 NOTE — DISCHARGE INSTRUCTIONS
Electrical Cardioversion, Care After  This sheet gives you information about how to care for yourself after your procedure. Your health care provider may also give you more specific instructions. If you have problems or questions, contact your health care provider.  What can I expect after the procedure?  After the procedure, it is common to have:  Some redness on the skin where the shocks were given.  Follow these instructions at home:    Do not drive for 24 hours if you were given a medicine to help you relax (sedative).  Take over-the-counter and prescription medicines only as told by your health care provider.  Ask your health care provider how to check your pulse. Check it often.  Rest for 48 hours after the procedure or as told by your health care provider.  Avoid or limit your caffeine use as told by your health care provider.  Contact a health care provider if:  You feel like your heart is beating too quickly or your pulse is not regular.  You have a serious muscle cramp that does not go away.  Get help right away if:    You have discomfort in your chest.  You are dizzy or you feel faint.  You have trouble breathing or you are short of breath.  Your speech is slurred.  You have trouble moving an arm or leg on one side of your body.  Your fingers or toes turn cold or blue.  This information is not intended to replace advice given to you by your health care provider. Make sure you discuss any questions you have with your health care provider.  Document Released: 10/08/2014 Document Revised: 11/30/2018 Document Reviewed: 06/23/2017  Elsevier Patient Education © 2020 [employer]

## 2024-09-06 DIAGNOSIS — R73.03 PREDIABETES: ICD-10-CM

## 2024-09-06 DIAGNOSIS — Z12.5 SCREENING PSA (PROSTATE SPECIFIC ANTIGEN): ICD-10-CM

## 2024-09-06 DIAGNOSIS — Z13.0 ENCOUNTER FOR SCREENING FOR HEMATOLOGIC DISORDER: ICD-10-CM

## 2024-09-06 DIAGNOSIS — E78.00 PURE HYPERCHOLESTEROLEMIA: ICD-10-CM

## 2024-09-06 DIAGNOSIS — Z13.29 SCREENING FOR THYROID DISORDER: ICD-10-CM

## 2024-09-06 DIAGNOSIS — E55.9 VITAMIN D DEFICIENCY: ICD-10-CM

## 2024-09-06 DIAGNOSIS — M1A.0710 IDIOPATHIC CHRONIC GOUT OF RIGHT FOOT WITHOUT TOPHUS: ICD-10-CM

## 2024-09-06 DIAGNOSIS — Z13.228 ENCOUNTER FOR SCREENING FOR METABOLIC DISORDER: ICD-10-CM

## 2024-09-09 NOTE — TELEPHONE ENCOUNTER
Received request via: Pharmacy    Was the patient seen in the last year in this department? Yes    Does the patient have an active prescription (recently filled or refills available) for medication(s) requested? No    [facility]     Does the patient have long term Plus and need 100-day supply? (This applies to ALL medications) Patient does not have SCP

## 2024-09-11 ENCOUNTER — APPOINTMENT (OUTPATIENT)
Dept: ADMISSIONS | Facility: MEDICAL CENTER | Age: 64
End: 2024-09-11
Attending: INTERNAL MEDICINE
Payer: MEDICARE

## 2024-09-11 RX ORDER — ALLOPURINOL 300 MG/1
300 TABLET ORAL
Qty: 90 TABLET | Refills: 0 | Status: SHIPPED | OUTPATIENT
Start: 2024-09-11

## 2024-09-11 NOTE — TELEPHONE ENCOUNTER
Refill done. Patient is due for annual appointment and fasting labs which are ordered to be done prior to appointment. Please call and schedule patient.  SUE Fisher.

## 2024-09-17 ENCOUNTER — PRE-ADMISSION TESTING (OUTPATIENT)
Dept: ADMISSIONS | Facility: MEDICAL CENTER | Age: 64
End: 2024-09-17
Attending: INTERNAL MEDICINE
Payer: MEDICARE

## 2024-09-17 DIAGNOSIS — Z01.812 PRE-OPERATIVE LABORATORY EXAMINATION: ICD-10-CM

## 2024-09-17 NOTE — PROGRESS NOTES
Chief Complaint   Patient presents with    Hospital Follow-up          Subjective:   Juan Martel is a 64 y.o. male who presents today for follow-up.     Patient of Dr. Faith.  Current medical problems include atrial fibrillation s/p multiple ablations with Dr. Faith, permanent pacemaker, morbid obesity.   In August he had an electrical cardioversion for persistent atrial flutter found on remote transmission.   He is scheduled for a generator change with Dr. Faith 9/30.    He is here for follow up after electrical cardioversion. He remains in sinus rhythm, based on his symptoms. He is very symptomatic in atrial fibrillation/flutter. He has been taking his Xarelto everyday since the cardioversion, no missed doses of Xarelto or sotalol.     No known liver issues. Does not drink.     He lost his wife this year to cancer.       Past Medical History:   Diagnosis Date    Anesthesia 09/17/2024    PONV, even after sedation, pt with strong history of motion sickness    At risk for sleep apnea     Atrial fibrillation (HCC) 09/17/2024    medicated    Cardiac asystole (HCC) 12/20/2016    possible vaso-vagal    Cardiac pacemaker in situ 12/20/2016    Gout     Hemorrhagic disorder (HCC) 09/17/2024    Xarelto    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    medicated    Pneumonia 2000    PONV (postoperative nausea and vomiting) 09/17/2024    pt with strong history of motion sickness    Sleep apnea 09/17/2024    CPAP         Family History   Problem Relation Age of Onset    Heart Disease Mother         atrial fib    Stroke Father 65        CVa    Diabetes Father     Cancer Sister         skin    No Known Problems Brother     Stroke Paternal Grandmother          Social History     Tobacco Use    Smoking status: Never    Smokeless tobacco: Never   Vaping Use    Vaping status: Never Used   Substance Use Topics    Alcohol use: Yes     Alcohol/week: 0.6 oz     Types: 1 Shots of liquor per week     Comment: one weekly  "   Drug use: No         Allergies   Allergen Reactions    Bloodless      Pt requests bloodless protocol    Codeine Vomiting    Tramadol Vomiting and Nausea         Current Outpatient Medications   Medication Sig    allopurinol (ZYLOPRIM) 300 MG Tab Take 1 Tablet by mouth every day. LAST refill, due for annual follow up with PCP for further refills (Patient taking differently: Take 300 mg by mouth every day. LAST refill, due for annual follow up with PCP for further refills    MEDICATION INSTRUCTIONS FOR SURGERY/PROCEDURE SCHEDULED FOR 9/30/24   OK TO CONTINUE TAKING PRIOR TO SURGERY AND DAY OF SURGERY)    rivaroxaban (XARELTO) 20 MG Tab tablet Take 1 Tablet by mouth with dinner. (Patient taking differently: Take 20 mg by mouth with dinner.     HOLD FOR 2 DAYS PER CARDIOLOGY FOR PROCEDURE 9/30/24)    sotalol (BETAPACE) 80 MG Tab TAKE 1 TABLET BY MOUTH TWICE A DAY (Patient taking differently: Take 80 mg by mouth 2 times a day.     MEDICATION INSTRUCTIONS FOR SURGERY/PROCEDURE SCHEDULED FOR 9/30/24   OK TO CONTINUE TAKING PRIOR TO SURGERY AND DAY OF SURGERY)    Potassium (POTASSIMIN PO) Take 2 Tablets by mouth every day. OTC strength    MEDICATION INSTRUCTIONS FOR SURGERY/PROCEDURE SCHEDULED FOR 9/30/24   OK TO CONTINUE TAKING PRIOR TO SURGERY AND DAY OF SURGERY    Magnesium 500 MG Tab Take 500 mg by mouth every day.     MEDICATION INSTRUCTIONS FOR SURGERY/PROCEDURE SCHEDULED FOR 9/30/24   OK TO CONTINUE TAKING PRIOR TO SURGERY AND DAY OF SURGERY         Review of Systems   Respiratory:  Negative for cough and shortness of breath.    Cardiovascular:  Negative for chest pain, palpitations, orthopnea, leg swelling and PND.   Neurological:  Negative for dizziness.          Objective:   /74 (BP Location: Left arm, Patient Position: Sitting, BP Cuff Size: Adult)   Pulse (!) 59   Resp 14   Ht 1.753 m (5' 9\")   Wt (!) 130 kg (286 lb)   SpO2 94%  Body mass index is 42.23 kg/m².         Physical Exam  Vitals " reviewed.   Constitutional:       Appearance: He is obese.   HENT:      Head: Normocephalic and atraumatic.   Cardiovascular:      Rate and Rhythm: Regular rhythm. Bradycardia present.      Heart sounds: No murmur heard.  Pulmonary:      Effort: Pulmonary effort is normal. No respiratory distress.      Breath sounds: No rales.   Skin:     General: Skin is warm.   Neurological:      Mental Status: He is alert.      Gait: Gait normal.   Psychiatric:         Judgment: Judgment normal.            Assessment:     1. SSS (sick sinus syndrome) ([facility])        2. Cardiac pacemaker in situ        3. Atypical atrial flutter ([facility])        4. Atrial fibrillation, unspecified type ([facility])        5. S/P ablation of atrial fibrillation        6. Hypercoagulable state ([facility])        7. Pacemaker at end of battery life        8. Transaminitis             Medical Decision Making:  Today's Assessment / Plan:   S/p successful electrical cardioversion  - Remains in RRR today. Cont sotalol and Xarelto  - rhythm assessment via PPm  - proceed with generator change next week    Transaminitis  - CMP next week for trend. Instructed to review results with PCP         Return for Dr Faith or his care team.  Sooner if problems.

## 2024-09-21 DIAGNOSIS — I48.4 ATYPICAL ATRIAL FLUTTER (HCC): ICD-10-CM

## 2024-09-23 ENCOUNTER — OFFICE VISIT (OUTPATIENT)
Dept: CARDIOLOGY | Facility: MEDICAL CENTER | Age: 64
End: 2024-09-23
Attending: PHYSICIAN ASSISTANT
Payer: MEDICARE

## 2024-09-23 VITALS
HEART RATE: 59 BPM | HEIGHT: 69 IN | SYSTOLIC BLOOD PRESSURE: 126 MMHG | BODY MASS INDEX: 42.36 KG/M2 | OXYGEN SATURATION: 94 % | RESPIRATION RATE: 14 BRPM | DIASTOLIC BLOOD PRESSURE: 74 MMHG | WEIGHT: 286 LBS

## 2024-09-23 DIAGNOSIS — Z45.010 PACEMAKER AT END OF BATTERY LIFE: ICD-10-CM

## 2024-09-23 DIAGNOSIS — R74.01 TRANSAMINITIS: ICD-10-CM

## 2024-09-23 DIAGNOSIS — Z98.890 S/P ABLATION OF ATRIAL FIBRILLATION: ICD-10-CM

## 2024-09-23 DIAGNOSIS — Z86.79 S/P ABLATION OF ATRIAL FIBRILLATION: ICD-10-CM

## 2024-09-23 DIAGNOSIS — I48.91 ATRIAL FIBRILLATION, UNSPECIFIED TYPE (HCC): ICD-10-CM

## 2024-09-23 DIAGNOSIS — I49.5 SSS (SICK SINUS SYNDROME) (HCC): ICD-10-CM

## 2024-09-23 DIAGNOSIS — I48.4 ATYPICAL ATRIAL FLUTTER (HCC): ICD-10-CM

## 2024-09-23 DIAGNOSIS — D68.59 HYPERCOAGULABLE STATE (HCC): ICD-10-CM

## 2024-09-23 DIAGNOSIS — Z95.0 CARDIAC PACEMAKER IN SITU: ICD-10-CM

## 2024-09-23 PROCEDURE — 99211 OFF/OP EST MAY X REQ PHY/QHP: CPT | Performed by: PHYSICIAN ASSISTANT

## 2024-09-23 PROCEDURE — 3074F SYST BP LT 130 MM HG: CPT | Performed by: PHYSICIAN ASSISTANT

## 2024-09-23 PROCEDURE — 99213 OFFICE O/P EST LOW 20 MIN: CPT | Performed by: PHYSICIAN ASSISTANT

## 2024-09-23 PROCEDURE — 3078F DIAST BP <80 MM HG: CPT | Performed by: PHYSICIAN ASSISTANT

## 2024-09-23 RX ORDER — SOTALOL HYDROCHLORIDE 80 MG/1
80 TABLET ORAL 2 TIMES DAILY
Qty: 180 TABLET | Refills: 3 | Status: SHIPPED | OUTPATIENT
Start: 2024-09-23

## 2024-09-23 ASSESSMENT — ENCOUNTER SYMPTOMS
COUGH: 0
ORTHOPNEA: 0
PND: 0
DIZZINESS: 0
SHORTNESS OF BREATH: 0
PALPITATIONS: 0

## 2024-09-23 ASSESSMENT — FIBROSIS 4 INDEX: FIB4 SCORE: 2.17

## 2024-09-26 ENCOUNTER — PRE-ADMISSION TESTING (OUTPATIENT)
Dept: ADMISSIONS | Facility: MEDICAL CENTER | Age: 64
End: 2024-09-26
Attending: INTERNAL MEDICINE
Payer: MEDICARE

## 2024-09-26 DIAGNOSIS — Z01.812 PRE-OPERATIVE LABORATORY EXAMINATION: ICD-10-CM

## 2024-09-26 DIAGNOSIS — Z01.810 PRE-OPERATIVE CARDIOVASCULAR EXAMINATION: ICD-10-CM

## 2024-09-26 LAB
ALBUMIN SERPL BCP-MCNC: 4.1 G/DL (ref 3.2–4.9)
ALBUMIN/GLOB SERPL: 1.2 G/DL
ALP SERPL-CCNC: 86 U/L (ref 30–99)
ALT SERPL-CCNC: 67 U/L (ref 2–50)
ANION GAP SERPL CALC-SCNC: 13 MMOL/L (ref 7–16)
AST SERPL-CCNC: 46 U/L (ref 12–45)
BILIRUB SERPL-MCNC: 0.7 MG/DL (ref 0.1–1.5)
BUN SERPL-MCNC: 14 MG/DL (ref 8–22)
CALCIUM ALBUM COR SERPL-MCNC: 9.2 MG/DL (ref 8.5–10.5)
CALCIUM SERPL-MCNC: 9.3 MG/DL (ref 8.5–10.5)
CHLORIDE SERPL-SCNC: 102 MMOL/L (ref 96–112)
CO2 SERPL-SCNC: 23 MMOL/L (ref 20–33)
CREAT SERPL-MCNC: 0.98 MG/DL (ref 0.5–1.4)
EKG IMPRESSION: NORMAL
ERYTHROCYTE [DISTWIDTH] IN BLOOD BY AUTOMATED COUNT: 46.2 FL (ref 35.9–50)
GFR SERPLBLD CREATININE-BSD FMLA CKD-EPI: 85 ML/MIN/1.73 M 2
GLOBULIN SER CALC-MCNC: 3.3 G/DL (ref 1.9–3.5)
GLUCOSE SERPL-MCNC: 94 MG/DL (ref 65–99)
HCT VFR BLD AUTO: 44.9 % (ref 42–52)
HGB BLD-MCNC: 15.2 G/DL (ref 14–18)
INR PPP: 1.5 (ref 0.87–1.13)
MCH RBC QN AUTO: 31 PG (ref 27–33)
MCHC RBC AUTO-ENTMCNC: 33.9 G/DL (ref 32.3–36.5)
MCV RBC AUTO: 91.4 FL (ref 81.4–97.8)
PLATELET # BLD AUTO: 178 K/UL (ref 164–446)
PMV BLD AUTO: 10.3 FL (ref 9–12.9)
POTASSIUM SERPL-SCNC: 4.2 MMOL/L (ref 3.6–5.5)
PROT SERPL-MCNC: 7.4 G/DL (ref 6–8.2)
PROTHROMBIN TIME: 18.1 SEC (ref 12–14.6)
RBC # BLD AUTO: 4.91 M/UL (ref 4.7–6.1)
SODIUM SERPL-SCNC: 138 MMOL/L (ref 135–145)
WBC # BLD AUTO: 6.9 K/UL (ref 4.8–10.8)

## 2024-09-26 PROCEDURE — 85610 PROTHROMBIN TIME: CPT

## 2024-09-26 PROCEDURE — 93005 ELECTROCARDIOGRAM TRACING: CPT

## 2024-09-26 PROCEDURE — 93010 ELECTROCARDIOGRAM REPORT: CPT | Performed by: INTERNAL MEDICINE

## 2024-09-26 PROCEDURE — 80053 COMPREHEN METABOLIC PANEL: CPT

## 2024-09-26 PROCEDURE — 36415 COLL VENOUS BLD VENIPUNCTURE: CPT

## 2024-09-26 PROCEDURE — 85027 COMPLETE CBC AUTOMATED: CPT

## 2024-09-30 ENCOUNTER — PHARMACY VISIT (OUTPATIENT)
Dept: PHARMACY | Facility: MEDICAL CENTER | Age: 64
End: 2024-09-30
Payer: MEDICARE

## 2024-09-30 ENCOUNTER — APPOINTMENT (OUTPATIENT)
Dept: CARDIOLOGY | Facility: MEDICAL CENTER | Age: 64
End: 2024-09-30
Attending: INTERNAL MEDICINE
Payer: MEDICARE

## 2024-09-30 ENCOUNTER — HOSPITAL ENCOUNTER (OUTPATIENT)
Facility: MEDICAL CENTER | Age: 64
End: 2024-09-30
Attending: INTERNAL MEDICINE | Admitting: INTERNAL MEDICINE
Payer: MEDICARE

## 2024-09-30 VITALS
RESPIRATION RATE: 18 BRPM | SYSTOLIC BLOOD PRESSURE: 150 MMHG | OXYGEN SATURATION: 93 % | WEIGHT: 287.26 LBS | TEMPERATURE: 96.6 F | BODY MASS INDEX: 42.55 KG/M2 | HEART RATE: 60 BPM | DIASTOLIC BLOOD PRESSURE: 88 MMHG | HEIGHT: 69 IN

## 2024-09-30 DIAGNOSIS — Z45.010 PACEMAKER AT END OF BATTERY LIFE: ICD-10-CM

## 2024-09-30 LAB — EKG IMPRESSION: NORMAL

## 2024-09-30 PROCEDURE — RXMED WILLOW AMBULATORY MEDICATION CHARGE: Performed by: NURSE PRACTITIONER

## 2024-09-30 PROCEDURE — 700111 HCHG RX REV CODE 636 W/ 250 OVERRIDE (IP): Mod: JZ | Performed by: INTERNAL MEDICINE

## 2024-09-30 PROCEDURE — 33228 REMV&REPLC PM GEN DUAL LEAD: CPT | Performed by: INTERNAL MEDICINE

## 2024-09-30 PROCEDURE — 700102 HCHG RX REV CODE 250 W/ 637 OVERRIDE(OP): Performed by: NURSE PRACTITIONER

## 2024-09-30 PROCEDURE — 700101 HCHG RX REV CODE 250

## 2024-09-30 PROCEDURE — 160035 HCHG PACU - 1ST 60 MINS PHASE I

## 2024-09-30 PROCEDURE — 160002 HCHG RECOVERY MINUTES (STAT)

## 2024-09-30 PROCEDURE — 160047 HCHG PACU  - EA ADDL 30 MINS PHASE II

## 2024-09-30 PROCEDURE — 99153 MOD SED SAME PHYS/QHP EA: CPT

## 2024-09-30 PROCEDURE — 700111 HCHG RX REV CODE 636 W/ 250 OVERRIDE (IP): Mod: JZ

## 2024-09-30 PROCEDURE — 93005 ELECTROCARDIOGRAM TRACING: CPT | Performed by: INTERNAL MEDICINE

## 2024-09-30 PROCEDURE — 160046 HCHG PACU - 1ST 60 MINS PHASE II

## 2024-09-30 PROCEDURE — 99152 MOD SED SAME PHYS/QHP 5/>YRS: CPT | Performed by: INTERNAL MEDICINE

## 2024-09-30 PROCEDURE — 160036 HCHG PACU - EA ADDL 30 MINS PHASE I

## 2024-09-30 PROCEDURE — A9270 NON-COVERED ITEM OR SERVICE: HCPCS | Performed by: NURSE PRACTITIONER

## 2024-09-30 PROCEDURE — 93010 ELECTROCARDIOGRAM REPORT: CPT | Performed by: INTERNAL MEDICINE

## 2024-09-30 RX ORDER — SCOLOPAMINE TRANSDERMAL SYSTEM 1 MG/1
1 PATCH, EXTENDED RELEASE TRANSDERMAL
Status: DISCONTINUED | OUTPATIENT
Start: 2024-09-30 | End: 2024-09-30 | Stop reason: HOSPADM

## 2024-09-30 RX ORDER — CEPHALEXIN 500 MG/1
500 CAPSULE ORAL 2 TIMES DAILY
Qty: 10 CAPSULE | Refills: 0 | Status: SHIPPED | OUTPATIENT
Start: 2024-09-30 | End: 2024-10-08

## 2024-09-30 RX ORDER — MIDAZOLAM HYDROCHLORIDE 1 MG/ML
INJECTION INTRAMUSCULAR; INTRAVENOUS
Status: COMPLETED
Start: 2024-09-30 | End: 2024-09-30

## 2024-09-30 RX ORDER — PROCHLORPERAZINE EDISYLATE 5 MG/ML
10 INJECTION INTRAMUSCULAR; INTRAVENOUS ONCE
Status: COMPLETED | OUTPATIENT
Start: 2024-09-30 | End: 2024-09-30

## 2024-09-30 RX ORDER — ONDANSETRON 2 MG/ML
INJECTION INTRAMUSCULAR; INTRAVENOUS
Status: COMPLETED
Start: 2024-09-30 | End: 2024-09-30

## 2024-09-30 RX ORDER — ACETAMINOPHEN 325 MG/1
650 TABLET ORAL EVERY 4 HOURS PRN
Status: DISCONTINUED | OUTPATIENT
Start: 2024-09-30 | End: 2024-09-30 | Stop reason: HOSPADM

## 2024-09-30 RX ORDER — CEFAZOLIN SODIUM 1 G/3ML
INJECTION, POWDER, FOR SOLUTION INTRAMUSCULAR; INTRAVENOUS
Status: COMPLETED
Start: 2024-09-30 | End: 2024-09-30

## 2024-09-30 RX ORDER — LIDOCAINE HYDROCHLORIDE 20 MG/ML
INJECTION, SOLUTION INFILTRATION; PERINEURAL
Status: COMPLETED
Start: 2024-09-30 | End: 2024-09-30

## 2024-09-30 RX ORDER — BUPIVACAINE HYDROCHLORIDE 5 MG/ML
INJECTION, SOLUTION EPIDURAL; INTRACAUDAL
Status: COMPLETED
Start: 2024-09-30 | End: 2024-09-30

## 2024-09-30 RX ADMIN — PROCHLORPERAZINE EDISYLATE 10 MG: 5 INJECTION INTRAMUSCULAR; INTRAVENOUS at 11:13

## 2024-09-30 RX ADMIN — MIDAZOLAM HYDROCHLORIDE 2 MG: 1 INJECTION, SOLUTION INTRAMUSCULAR; INTRAVENOUS at 10:29

## 2024-09-30 RX ADMIN — LIDOCAINE HYDROCHLORIDE: 20 INJECTION, SOLUTION INFILTRATION; PERINEURAL at 10:26

## 2024-09-30 RX ADMIN — FENTANYL CITRATE 100 MCG: 50 INJECTION, SOLUTION INTRAMUSCULAR; INTRAVENOUS at 10:29

## 2024-09-30 RX ADMIN — CEFAZOLIN 4000 MG: 1 INJECTION, POWDER, FOR SOLUTION INTRAMUSCULAR; INTRAVENOUS at 10:26

## 2024-09-30 RX ADMIN — ONDANSETRON 4 MG: 2 INJECTION INTRAMUSCULAR; INTRAVENOUS at 10:29

## 2024-09-30 RX ADMIN — SCOPOLAMINE 1 PATCH: 1.5 PATCH, EXTENDED RELEASE TRANSDERMAL at 12:28

## 2024-09-30 RX ADMIN — MIDAZOLAM HYDROCHLORIDE 0.5 MG: 1 INJECTION, SOLUTION INTRAMUSCULAR; INTRAVENOUS at 10:57

## 2024-09-30 RX ADMIN — BUPIVACAINE HYDROCHLORIDE: 5 INJECTION, SOLUTION EPIDURAL; INTRACAUDAL at 10:26

## 2024-09-30 ASSESSMENT — FIBROSIS 4 INDEX: FIB4 SCORE: 2.08

## 2024-10-07 ENCOUNTER — NON-PROVIDER VISIT (OUTPATIENT)
Dept: CARDIOLOGY | Facility: MEDICAL CENTER | Age: 64
End: 2024-10-07
Attending: NURSE PRACTITIONER
Payer: MEDICARE

## 2024-10-07 ENCOUNTER — NON-PROVIDER VISIT (OUTPATIENT)
Dept: CARDIOLOGY | Facility: MEDICAL CENTER | Age: 64
End: 2024-10-07

## 2024-10-07 DIAGNOSIS — I48.4 ATYPICAL ATRIAL FLUTTER (HCC): ICD-10-CM

## 2024-10-07 DIAGNOSIS — I49.5 SSS (SICK SINUS SYNDROME) (HCC): ICD-10-CM

## 2024-10-07 DIAGNOSIS — Z95.0 CARDIAC PACEMAKER IN SITU: ICD-10-CM

## 2024-10-07 PROCEDURE — 93280 PM DEVICE PROGR EVAL DUAL: CPT | Performed by: STUDENT IN AN ORGANIZED HEALTH CARE EDUCATION/TRAINING PROGRAM

## 2024-10-08 ENCOUNTER — OFFICE VISIT (OUTPATIENT)
Dept: CARDIOLOGY | Facility: MEDICAL CENTER | Age: 64
End: 2024-10-08
Attending: NURSE PRACTITIONER
Payer: MEDICARE

## 2024-10-08 VITALS
HEART RATE: 60 BPM | DIASTOLIC BLOOD PRESSURE: 72 MMHG | OXYGEN SATURATION: 94 % | WEIGHT: 282 LBS | RESPIRATION RATE: 16 BRPM | BODY MASS INDEX: 41.77 KG/M2 | HEIGHT: 69 IN | SYSTOLIC BLOOD PRESSURE: 130 MMHG

## 2024-10-08 DIAGNOSIS — D68.59 HYPERCOAGULABLE STATE (HCC): Chronic | ICD-10-CM

## 2024-10-08 DIAGNOSIS — G47.33 OSA (OBSTRUCTIVE SLEEP APNEA): ICD-10-CM

## 2024-10-08 DIAGNOSIS — Z79.899 HIGH RISK MEDICATION USE: ICD-10-CM

## 2024-10-08 DIAGNOSIS — Z86.79 S/P ABLATION OF ATRIAL FIBRILLATION: ICD-10-CM

## 2024-10-08 DIAGNOSIS — Z98.890 S/P ABLATION OF ATRIAL FIBRILLATION: ICD-10-CM

## 2024-10-08 DIAGNOSIS — I48.0 PAROXYSMAL ATRIAL FIBRILLATION (HCC): ICD-10-CM

## 2024-10-08 DIAGNOSIS — I49.5 SSS (SICK SINUS SYNDROME) (HCC): ICD-10-CM

## 2024-10-08 DIAGNOSIS — I48.92 ATRIAL FLUTTER, UNSPECIFIED TYPE (HCC): ICD-10-CM

## 2024-10-08 PROCEDURE — 99211 OFF/OP EST MAY X REQ PHY/QHP: CPT | Performed by: NURSE PRACTITIONER

## 2024-10-08 PROCEDURE — 93005 ELECTROCARDIOGRAM TRACING: CPT | Performed by: NURSE PRACTITIONER

## 2024-10-08 PROCEDURE — 99214 OFFICE O/P EST MOD 30 MIN: CPT | Performed by: NURSE PRACTITIONER

## 2024-10-08 ASSESSMENT — ENCOUNTER SYMPTOMS
COUGH: 0
CHILLS: 0
ORTHOPNEA: 0
SENSORY CHANGE: 0
TREMORS: 0
SPEECH CHANGE: 0
PND: 0
DIZZINESS: 0
HEARTBURN: 0
HEADACHES: 0
WEIGHT LOSS: 0
FOCAL WEAKNESS: 0
WHEEZING: 0
LOSS OF CONSCIOUSNESS: 0
PALPITATIONS: 0
SHORTNESS OF BREATH: 0
HEMOPTYSIS: 0
SPUTUM PRODUCTION: 0
NAUSEA: 0
VOMITING: 0
FEVER: 0
TINGLING: 0

## 2024-10-08 ASSESSMENT — FIBROSIS 4 INDEX: FIB4 SCORE: 2.08

## 2024-10-24 LAB — EKG IMPRESSION: NORMAL

## 2024-10-24 PROCEDURE — 93010 ELECTROCARDIOGRAM REPORT: CPT | Performed by: INTERNAL MEDICINE

## 2024-11-11 ENCOUNTER — TELEPHONE (OUTPATIENT)
Dept: CARDIOLOGY | Facility: MEDICAL CENTER | Age: 64
End: 2024-11-11
Payer: MEDICARE

## 2024-11-11 DIAGNOSIS — Z79.899 HIGH RISK MEDICATION USE: ICD-10-CM

## 2024-11-11 DIAGNOSIS — I48.0 PAROXYSMAL ATRIAL FIBRILLATION (HCC): ICD-10-CM

## 2024-11-11 NOTE — TELEPHONE ENCOUNTER
Phone Number Called: [phone]    Call outcome: Spoke to patient regarding message below.    Message: Called to discuss patients concerns and symptoms.   Pt reporting feeling more fatigue the last couple days. Heart rate last night was 90's, chest feeling tighter.  Patient is nervous that he went back into Afib.   No current vitals or rhythm strip.  Pt reporting he sent in a remote transmission this morning.   Pt reporting he had coffee and a diet coke yesterday.   Currently taking sotalol and xarelto, no missed doses.    Discussed I will reach out to our device team to pull a transmission.   Patient verbalized understanding.

## 2024-11-11 NOTE — TELEPHONE ENCOUNTER
ADRIENNE    Caller: Juan Martel    Topic/issue: Patient thinks he went back into afib and requesting call back.  Symptoms started lastnight. Started feeling really tired the past few days.  Heart rate never went below the 90's lastnight.   Pacemaker just placed on 09/30/24.   He sent results from his [employer] and wanted to know if we received that as well. It's beeping yellow.    Callback Number: [phone]    Thank you,  Madiha CAZARES

## 2024-11-12 ENCOUNTER — NON-PROVIDER VISIT (OUTPATIENT)
Dept: CARDIOLOGY | Facility: MEDICAL CENTER | Age: 64
End: 2024-11-12

## 2024-11-12 ENCOUNTER — NON-PROVIDER VISIT (OUTPATIENT)
Dept: CARDIOLOGY | Facility: MEDICAL CENTER | Age: 64
End: 2024-11-12
Attending: INTERNAL MEDICINE
Payer: MEDICARE

## 2024-11-12 ENCOUNTER — NON-PROVIDER VISIT (OUTPATIENT)
Dept: CARDIOLOGY | Facility: MEDICAL CENTER | Age: 64
End: 2024-11-12
Attending: NURSE PRACTITIONER
Payer: MEDICARE

## 2024-11-12 DIAGNOSIS — I48.0 PAROXYSMAL ATRIAL FIBRILLATION (HCC): ICD-10-CM

## 2024-11-12 DIAGNOSIS — Z95.0 CARDIAC PACEMAKER IN SITU: ICD-10-CM

## 2024-11-12 DIAGNOSIS — I49.5 SSS (SICK SINUS SYNDROME) (HCC): ICD-10-CM

## 2024-11-12 LAB — EKG IMPRESSION: NORMAL

## 2024-11-12 PROCEDURE — 93280 PM DEVICE PROGR EVAL DUAL: CPT | Mod: 26 | Performed by: INTERNAL MEDICINE

## 2024-11-12 PROCEDURE — 93005 ELECTROCARDIOGRAM TRACING: CPT

## 2024-11-12 PROCEDURE — 93280 PM DEVICE PROGR EVAL DUAL: CPT | Performed by: INTERNAL MEDICINE

## 2024-11-12 NOTE — NON-PROVIDER
Patient was here today for EKG. EKG performed and transferred into patients chart. [facility] to Roberta COSBY. RN will contact patient to follow up unless patient is symptomatic.   Is patient reporting any symptoms? No

## 2024-11-12 NOTE — TELEPHONE ENCOUNTER
Transmission received, patient is presenting in AF at this time, episode onset 11/10/2024 @ 9:03 PM, report uploaded to media.

## 2024-11-12 NOTE — TELEPHONE ENCOUNTER
Looks like he has pacer check today at 2:45, lets get an ecg to see if may be able  to potentially increase Sotalol dose.     Also please order CMP and mag level stat and have him get done today or tomorrow Am.     Thanks   Roberta

## 2024-11-12 NOTE — TELEPHONE ENCOUNTER
EA - Please advise, patient presenting in AF as of 11/10. Pt called yesterday with symptoms of fatigue and elevated HR (See convo below).   Currently taking sotalol and xarelto, no missed doses.     Any recommendations?

## 2024-11-13 ENCOUNTER — HOSPITAL ENCOUNTER (OUTPATIENT)
Dept: LAB | Facility: MEDICAL CENTER | Age: 64
End: 2024-11-13
Attending: NURSE PRACTITIONER
Payer: MEDICARE

## 2024-11-13 ENCOUNTER — NON-PROVIDER VISIT (OUTPATIENT)
Dept: CARDIOLOGY | Facility: MEDICAL CENTER | Age: 64
End: 2024-11-13
Attending: NURSE PRACTITIONER
Payer: MEDICARE

## 2024-11-13 ENCOUNTER — TELEPHONE (OUTPATIENT)
Dept: CARDIOLOGY | Facility: MEDICAL CENTER | Age: 64
End: 2024-11-13

## 2024-11-13 DIAGNOSIS — I48.0 PAROXYSMAL ATRIAL FIBRILLATION (HCC): ICD-10-CM

## 2024-11-13 DIAGNOSIS — I48.19 OTHER PERSISTENT ATRIAL FIBRILLATION (HCC): ICD-10-CM

## 2024-11-13 DIAGNOSIS — Z79.899 HIGH RISK MEDICATION USE: ICD-10-CM

## 2024-11-13 LAB
ALBUMIN SERPL BCP-MCNC: 4 G/DL (ref 3.2–4.9)
ALBUMIN/GLOB SERPL: 1.2 G/DL
ALP SERPL-CCNC: 82 U/L (ref 30–99)
ALT SERPL-CCNC: 50 U/L (ref 2–50)
ANION GAP SERPL CALC-SCNC: 9 MMOL/L (ref 7–16)
AST SERPL-CCNC: 33 U/L (ref 12–45)
BILIRUB SERPL-MCNC: 0.5 MG/DL (ref 0.1–1.5)
BUN SERPL-MCNC: 16 MG/DL (ref 8–22)
CALCIUM ALBUM COR SERPL-MCNC: 9.2 MG/DL (ref 8.5–10.5)
CALCIUM SERPL-MCNC: 9.2 MG/DL (ref 8.5–10.5)
CHLORIDE SERPL-SCNC: 103 MMOL/L (ref 96–112)
CO2 SERPL-SCNC: 25 MMOL/L (ref 20–33)
CREAT SERPL-MCNC: 0.95 MG/DL (ref 0.5–1.4)
EKG IMPRESSION: NORMAL
GFR SERPLBLD CREATININE-BSD FMLA CKD-EPI: 88 ML/MIN/1.73 M 2
GLOBULIN SER CALC-MCNC: 3.3 G/DL (ref 1.9–3.5)
GLUCOSE SERPL-MCNC: 113 MG/DL (ref 65–99)
MAGNESIUM SERPL-MCNC: 2.1 MG/DL (ref 1.5–2.5)
POTASSIUM SERPL-SCNC: 4.3 MMOL/L (ref 3.6–5.5)
PROT SERPL-MCNC: 7.3 G/DL (ref 6–8.2)
SODIUM SERPL-SCNC: 137 MMOL/L (ref 135–145)

## 2024-11-13 PROCEDURE — 80053 COMPREHEN METABOLIC PANEL: CPT

## 2024-11-13 PROCEDURE — 93005 ELECTROCARDIOGRAM TRACING: CPT

## 2024-11-13 PROCEDURE — 36415 COLL VENOUS BLD VENIPUNCTURE: CPT

## 2024-11-13 PROCEDURE — 83735 ASSAY OF MAGNESIUM: CPT

## 2024-11-13 NOTE — CARDIAC REMOTE MONITOR - SCAN
Device transmission reviewed. Device demonstrated appropriate function.       Electronically Signed by: Giovani Pena M.D.    11/13/2024  7:52 AM

## 2024-11-13 NOTE — PROGRESS NOTES
Patient was here today for EKG. EKG performed and transferred into patients chart. Paper copy of EKG sent to Shanice YU. RN will contact patient to follow up unless patient is symptomatic.   Is patient reporting any symptoms? No   If yes, RN to visit exam room 50    Patient did not had any questions at this time and no symptoms at this time.

## 2024-11-13 NOTE — TELEPHONE ENCOUNTER
Reviewed plan for Sotalol increase with Dr Faith with in office ecgs, ok to proceed.   He is going to increase Sotalol to 120 mg (1.5 tab) starting tomorrow.  He will get labs drawn tomorrow AM as the lab would not draw this afternoon.  He will come for follow up ecg in office tomorrow.     Reviewed ER precautions should he have symptomatic RVR overnight.

## 2024-11-13 NOTE — TELEPHONE ENCOUNTER
03/15/22                            Cee Herndonarreal  [address] [facility]  [place] [facility] [place] [facility] [address]    To Whom It May Concern:    This is to certify Cee Schroeder was evaluated with Shanelle Hoang CNM on 03/15/22 and can return to regular work on 03/17/2022.     RESTRICTIONS:None      Shanelle Hoang CNM  [facility] [address]  [address] [facility]  [address]  [facility] [address]  Phone: [phone]  Fax: [phone]     EA- Please advise on EKG in chart. .

## 2024-11-13 NOTE — PROGRESS NOTES
Patient in for a non-provider EKG for increased sotalol dosage. Patient asymptomatic. Please see tele encounter dated 11/11/24 for full documentation.

## 2024-11-13 NOTE — TELEPHONE ENCOUNTER
S/w patient regarding EA recommendations, discussed for patient to come in tomorrow for repeat EKG. Patient verbalized understanding.

## 2024-11-14 ENCOUNTER — TELEPHONE (OUTPATIENT)
Dept: CARDIOLOGY | Facility: MEDICAL CENTER | Age: 64
End: 2024-11-14

## 2024-11-14 ENCOUNTER — NON-PROVIDER VISIT (OUTPATIENT)
Dept: CARDIOLOGY | Facility: MEDICAL CENTER | Age: 64
End: 2024-11-14
Attending: NURSE PRACTITIONER
Payer: MEDICARE

## 2024-11-14 DIAGNOSIS — I48.0 PAROXYSMAL ATRIAL FIBRILLATION (HCC): ICD-10-CM

## 2024-11-14 PROCEDURE — 93005 ELECTROCARDIOGRAM TRACING: CPT

## 2024-11-14 NOTE — TELEPHONE ENCOUNTER
QTc reviewed, stable, 457 ms per my calculation.     Need to get him scheduled for cardioversion.    Samantha, can you please place order?  Alfreda, Can you please schedule cardioversion for symptomatic atria flutter earlier next week?    Thank you,  Roberta

## 2024-11-14 NOTE — TELEPHONE ENCOUNTER
S/w patient regarding EA recommendations. Patient verbalized understanding.     Cardioversion order placed in chart.

## 2024-11-14 NOTE — TELEPHONE ENCOUNTER
----- Message from Nurse Chanell ULRICH R.N. sent at 11/14/2024 10:31 AM PST -----        ----- Message -----  From: Navya Thomas, [facility]  Sent: 11/14/2024  10:28 AM PST  To: Arcelia Eid R.N.; #

## 2024-11-14 NOTE — PROGRESS NOTES
Navya BEASLEY reached out to triage RN d/t pt complaint of symptoms. In to see pt and he states he feels more fatigue/tired than usual. When he is resting, HR 60's-70's. However when he is up and doing activities, his HR reaches above 100. Pt main symptoms tiredness, denies other cardiac symptoms including palpitations, SOB, light-headedness or dizziness. Pt confirmed he feels ok to drive back home. Encouraged hydration with water and not over-exerting with activities in the mean time. Continue current sotalol dose unless different recommendations received from EA. He verbalized understanding and was appreciative.    CC chart to ADRIENNE and Samantha YU for further review and follow up. Thank you!

## 2024-11-14 NOTE — PROGRESS NOTES
Patient was here today for EKG. EKG performed and transferred into patients chart. Paper copy of EKG given to Arcelia YU. RN will contact patient to follow up unless patient is symptomatic.   Is patient reporting any symptoms? Yes  If yes, RN to visit exam room 20

## 2024-11-14 NOTE — TELEPHONE ENCOUNTER
Patient is scheduled for a Cardioversion without anesthesia on 11- with Dr. Rebollar. Patient   has been instructed to check in at 12:00 pm for 2:00 procedure. No meds to hold. Patient has been advised they will need a  home and with them after since they are sedated. Message sent to authorizations. Sent [employer] message to pt with instructions. Juan M at [phone] notified of case. TERRY sent to To and Roberta

## 2024-11-15 ENCOUNTER — TELEPHONE (OUTPATIENT)
Dept: CARDIOLOGY | Facility: MEDICAL CENTER | Age: 64
End: 2024-11-15

## 2024-11-15 ENCOUNTER — NON-PROVIDER VISIT (OUTPATIENT)
Dept: CARDIOLOGY | Facility: MEDICAL CENTER | Age: 64
End: 2024-11-15
Attending: NURSE PRACTITIONER
Payer: MEDICARE

## 2024-11-15 DIAGNOSIS — I48.4 ATYPICAL ATRIAL FLUTTER (HCC): ICD-10-CM

## 2024-11-15 DIAGNOSIS — I48.0 PAROXYSMAL ATRIAL FIBRILLATION (HCC): ICD-10-CM

## 2024-11-15 PROCEDURE — 93005 ELECTROCARDIOGRAM TRACING: CPT

## 2024-11-15 RX ORDER — SOTALOL HYDROCHLORIDE 120 MG/1
120 TABLET ORAL 2 TIMES DAILY
Qty: 180 TABLET | Refills: 0 | Status: SHIPPED | OUTPATIENT
Start: 2024-11-15

## 2024-11-15 NOTE — PROGRESS NOTES
Patient was here today for EKG. EKG performed and transferred into patients chart. RN will contact patient to follow up unless patient is symptomatic.   Is patient reporting any symptoms? No

## 2024-11-15 NOTE — TELEPHONE ENCOUNTER
Patient in for a non-provider EKG. EKG voalted to EA. Per EA over Voalte she stated to let patient know that QTC still looks good and still looks like he is in aflutter so proceed with DCCV next week as scheduled and to update his Sotalol with his pharmacy to 120mg BID.    ---------------------------------------------------------  S/w patient about EA recommendations and he verbalized understanding. Updated RX sent to patient preferred pharmacy.

## 2024-11-18 ENCOUNTER — PRE-ADMISSION TESTING (OUTPATIENT)
Dept: ADMISSIONS | Facility: MEDICAL CENTER | Age: 64
End: 2024-11-18
Attending: NURSE PRACTITIONER
Payer: MEDICARE

## 2024-11-18 DIAGNOSIS — Z01.812 PRE-OPERATIVE LABORATORY EXAMINATION: ICD-10-CM

## 2024-11-18 ASSESSMENT — LIFESTYLE VARIABLES
HOW OFTEN DO YOU HAVE A DRINK CONTAINING ALCOHOL: 2-4 TIMES A MONTH
SKIP TO QUESTIONS 9-10: 1
HOW OFTEN DO YOU HAVE SIX OR MORE DRINKS ON ONE OCCASION: NEVER
HOW MANY STANDARD DRINKS CONTAINING ALCOHOL DO YOU HAVE ON A TYPICAL DAY: 1 OR 2
AUDIT-C TOTAL SCORE: 2

## 2024-11-19 ENCOUNTER — APPOINTMENT (OUTPATIENT)
Dept: CARDIOLOGY | Facility: MEDICAL CENTER | Age: 64
End: 2024-11-19
Attending: NURSE PRACTITIONER
Payer: MEDICARE

## 2024-11-19 ENCOUNTER — HOSPITAL ENCOUNTER (OUTPATIENT)
Facility: MEDICAL CENTER | Age: 64
End: 2024-11-19
Attending: INTERNAL MEDICINE | Admitting: INTERNAL MEDICINE
Payer: MEDICARE

## 2024-11-19 VITALS
WEIGHT: 285.72 LBS | DIASTOLIC BLOOD PRESSURE: 62 MMHG | BODY MASS INDEX: 42.32 KG/M2 | TEMPERATURE: 96.8 F | HEIGHT: 69 IN | OXYGEN SATURATION: 94 % | RESPIRATION RATE: 16 BRPM | HEART RATE: 60 BPM | SYSTOLIC BLOOD PRESSURE: 130 MMHG

## 2024-11-19 DIAGNOSIS — I48.3 TYPICAL ATRIAL FLUTTER (HCC): ICD-10-CM

## 2024-11-19 DIAGNOSIS — I48.0 PAROXYSMAL ATRIAL FIBRILLATION (HCC): ICD-10-CM

## 2024-11-19 LAB
EKG IMPRESSION: NORMAL
ERYTHROCYTE [DISTWIDTH] IN BLOOD BY AUTOMATED COUNT: 45.6 FL (ref 35.9–50)
HCT VFR BLD AUTO: 47.1 % (ref 42–52)
HGB BLD-MCNC: 16.2 G/DL (ref 14–18)
MCH RBC QN AUTO: 31 PG (ref 27–33)
MCHC RBC AUTO-ENTMCNC: 34.4 G/DL (ref 32.3–36.5)
MCV RBC AUTO: 90.2 FL (ref 81.4–97.8)
PLATELET # BLD AUTO: 182 K/UL (ref 164–446)
PMV BLD AUTO: 10.9 FL (ref 9–12.9)
RBC # BLD AUTO: 5.22 M/UL (ref 4.7–6.1)
WBC # BLD AUTO: 6.5 K/UL (ref 4.8–10.8)

## 2024-11-19 PROCEDURE — 93005 ELECTROCARDIOGRAM TRACING: CPT | Performed by: INTERNAL MEDICINE

## 2024-11-19 PROCEDURE — 92960 CARDIOVERSION ELECTRIC EXT: CPT

## 2024-11-19 PROCEDURE — 160035 HCHG PACU - 1ST 60 MINS PHASE I

## 2024-11-19 PROCEDURE — 85027 COMPLETE CBC AUTOMATED: CPT

## 2024-11-19 PROCEDURE — 160047 HCHG PACU  - EA ADDL 30 MINS PHASE II

## 2024-11-19 PROCEDURE — 700111 HCHG RX REV CODE 636 W/ 250 OVERRIDE (IP): Performed by: INTERNAL MEDICINE

## 2024-11-19 PROCEDURE — 160046 HCHG PACU - 1ST 60 MINS PHASE II

## 2024-11-19 PROCEDURE — 160002 HCHG RECOVERY MINUTES (STAT)

## 2024-11-19 PROCEDURE — 92960 CARDIOVERSION ELECTRIC EXT: CPT | Performed by: INTERNAL MEDICINE

## 2024-11-19 PROCEDURE — 93010 ELECTROCARDIOGRAM REPORT: CPT | Mod: 59 | Performed by: INTERNAL MEDICINE

## 2024-11-19 RX ORDER — ONDANSETRON 2 MG/ML
4 INJECTION INTRAMUSCULAR; INTRAVENOUS ONCE
Status: COMPLETED | OUTPATIENT
Start: 2024-11-19 | End: 2024-11-19

## 2024-11-19 RX ORDER — SODIUM CHLORIDE 9 MG/ML
500 INJECTION, SOLUTION INTRAVENOUS
Status: DISCONTINUED | OUTPATIENT
Start: 2024-11-19 | End: 2024-11-19 | Stop reason: HOSPADM

## 2024-11-19 RX ORDER — MIDAZOLAM HYDROCHLORIDE 1 MG/ML
.5-2 INJECTION INTRAMUSCULAR; INTRAVENOUS PRN
Status: DISCONTINUED | OUTPATIENT
Start: 2024-11-19 | End: 2024-11-19 | Stop reason: HOSPADM

## 2024-11-19 RX ORDER — PROCHLORPERAZINE EDISYLATE 5 MG/ML
10 INJECTION INTRAMUSCULAR; INTRAVENOUS ONCE
Status: COMPLETED | OUTPATIENT
Start: 2024-11-19 | End: 2024-11-19

## 2024-11-19 RX ADMIN — ONDANSETRON 4 MG: 2 INJECTION INTRAMUSCULAR; INTRAVENOUS at 14:15

## 2024-11-19 RX ADMIN — PROCHLORPERAZINE EDISYLATE 10 MG: 5 INJECTION INTRAMUSCULAR; INTRAVENOUS at 15:30

## 2024-11-19 RX ADMIN — FENTANYL CITRATE 50 MCG: 50 INJECTION, SOLUTION INTRAMUSCULAR; INTRAVENOUS at 13:17

## 2024-11-19 RX ADMIN — MIDAZOLAM HYDROCHLORIDE 2 MG: 1 INJECTION, SOLUTION INTRAMUSCULAR; INTRAVENOUS at 13:20

## 2024-11-19 RX ADMIN — MIDAZOLAM HYDROCHLORIDE 2 MG: 1 INJECTION, SOLUTION INTRAMUSCULAR; INTRAVENOUS at 13:17

## 2024-11-19 RX ADMIN — FENTANYL CITRATE 50 MCG: 50 INJECTION, SOLUTION INTRAMUSCULAR; INTRAVENOUS at 13:20

## 2024-11-19 ASSESSMENT — PAIN DESCRIPTION - PAIN TYPE: TYPE: SURGICAL PAIN

## 2024-11-19 ASSESSMENT — FIBROSIS 4 INDEX: FIB4 SCORE: 1.73

## 2024-11-19 NOTE — PROCEDURES
Electrical Cardioversion    Date/Time: 11/19/2024 1:26 PM    Performed by: Eleanor Rebollar M.D.  Authorized by: Eleanor Rebollar M.D.    Consent:     Consent obtained:  Written and verbal    Consent given by:  Patient    Risks discussed:  Death, cutaneous burn, induced arrhythmia and pain    Alternatives discussed:  No treatment, rate-control medication, alternative treatment and anti-coagulation medication  Sedation:     Patient sedated: Yes      Sedation type:  Moderate (conscious) sedation    Sedation:  Fentanyl 100mcg and Versed 4mg IV    Sedation start:  11/19/2024 1:17 PM    Sedation end:  11/19/2024 1:27 PM    Vital signs: Vital signs monitored during sedation    Pre-procedure details:     Cardioversion basis:  Elective    Rhythm:  Atrial flutter    Electrode placement:  Anterior-posterior    Anticoagulation status:  Rivaroxaban  Attempt one:     Cardioversion mode:  Synchronous    Shock (Joules):  200    Shock outcome:  Conversion to normal sinus rhythm  Post-procedure details:     Patient status:  Awake    Patient tolerance of procedure:  Tolerated well, no immediate complications  Comments:      Eleanor Rebollar M.D.

## 2024-11-19 NOTE — PROGRESS NOTES
"1333 PT arrived form ECHO lab awake alert and oriented x 4 with c/o nausea. Pt placed on monitor, VSS.    1400 Pt medicated for nausea and was able to tolerate PO fluids.    1410 Discharge instructions given to daughter. She verbalized understanding.     1420 Physician notified of patient's continued nausea.    1500 Physician notified of patient's continued nausea. Orders received.    1545 Pt ambulated and states that he feels, \" a little better\". PIV removed.     1550 Pt discharged via wheel chair with RN to responsible adult.  "

## 2024-11-19 NOTE — H&P
Cardiology H and P Note:    Eleanor Rebollar M.D.  Date & Time note created:    11/19/2024   11:59 AM       Patient ID:   Name:             Juan Martel     YOB: 1960  Age:                 64 y.o.  male   MRN:               [id number]                                                             Chief Complaint / Reason for consult:  Atrial flutter.    History of Present Illness:    63 yo man is here for cardioversion for sinus restoration.    Review of Systems:      Constitutional: Denies fevers, Denies weight changes  Eyes: Denies changes in vision, no eye pain  Ears/Nose/Throat/Mouth: Denies nasal congestion or sore throat   Cardiovascular: no chest pain, yes palpitations   Respiratory: no shortness of breath , Denies cough  Gastrointestinal/Hepatic: Denies abdominal pain, nausea, vomiting, diarrhea, constipation or GI bleeding   Genitourinary: Denies dysuria or frequency  Musculoskeletal/Rheum: Denies  joint pain and swelling   Skin: Denies rash  Neurological: Denies headache, confusion, memory loss or focal weakness/parasthesias  Psychiatric: denies mood disorder   Endocrine: Effie thyroid problems  Heme/Oncology/Lymph Nodes: Denies enlarged lymph nodes, denies brusing or known bleeding disorder  All other systems were reviewed and are negative (AMA/CMS criteria)                Past Medical History:   Past Medical History:   Diagnosis Date    Anesthesia 09/17/2024    PONV, even after sedation, pt with strong history of motion sickness    At risk for sleep apnea     Atrial fibrillation (HCC) 09/17/2024    medicated and A flutter    Breath shortness     When in A flutter    Cardiac asystole (HCC) 12/20/2016    possible vaso-vagal    Cardiac pacemaker in situ 12/20/2016    Family history of adverse effect to anesthesia     PONV    Gout     Hemorrhagic disorder (HCC) 09/17/2024    Xarelto    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    medicated    Pneumonia 2000    PONV  (postoperative nausea and vomiting) 09/17/2024    pt with strong history of motion sickness    Sleep apnea 09/17/2024    CPAP     Active Hospital Problems    Diagnosis     Typical atrial flutter (HCC) [I48.3]        Past Surgical History:  Past Surgical History:   Procedure Laterality Date    OTHER CARDIAC SURGERY  12/20/2016    pacemaker x 2, last one placed 9/2024    OTHER ORTHOPEDIC SURGERY  06/01/1990    achilles tendon repair left foot    OTHER      7 cardioversions    OTHER      4 cardiac ablations       Hospital Medications:  No current facility-administered medications for this encounter.    Current Outpatient Medications:  Medications Prior to Admission   Medication Sig Dispense Refill Last Dose/Taking    sotalol (BETAPACE) 120 MG tablet Take 1 Tablet by mouth 2 times a day. 180 Tablet 0 11/19/2024 at  9:15 AM    allopurinol (ZYLOPRIM) 300 MG Tab Take 1 Tablet by mouth every day. LAST refill, due for annual follow up with PCP for further refills (Patient taking differently: Take 300 mg by mouth every day. LAST refill, due for annual follow up with PCP for further refills) 90 Tablet 0 11/18/2024 at  9:30 PM    rivaroxaban (XARELTO) 20 MG Tab tablet Take 1 Tablet by mouth with dinner. (Patient taking differently: Take 20 mg by mouth with dinner.   ) 90 Tablet 3 11/18/2024 at  9:30 PM    Potassium (POTASSIMIN PO) Take 2 Tablets by mouth every day. OTC strength   11/18/2024 at  9:30 AM    Magnesium 500 MG Tab Take 500 mg by mouth every day.      11/18/2024 at  9:30 AM       Medication Allergy:  Allergies   Allergen Reactions    Bloodless      Pt requests bloodless protocol    Codeine Vomiting    Tramadol Vomiting and Nausea       Family History:  Family History   Problem Relation Age of Onset    Heart Disease Mother         atrial fib    Stroke Father 65        CVa    Diabetes Father     Cancer Sister         skin    No Known Problems Brother     Stroke Paternal Grandmother        Social History:  Social  "History     Socioeconomic History    Marital status:      Spouse name: Not on file    Number of children: Not on file    Years of education: Not on file    Highest education level: Not on file   Occupational History    Not on file   Tobacco Use    Smoking status: Never    Smokeless tobacco: Never   Vaping Use    Vaping status: Never Used   Substance and Sexual Activity    Alcohol use: Yes     Alcohol/week: 0.6 oz     Types: 1 Shots of liquor per week     Comment: one weekly    Drug use: Never    Sexual activity: Not on file   Other Topics Concern    Not on file   Social History Narrative    Not on file     Social Drivers of Health     Financial Resource Strain: Not on file   Food Insecurity: Not on file   Transportation Needs: Not on file   Physical Activity: Not on file   Stress: Not on file   Social Connections: Not on file   Intimate Partner Violence: Not on file   Housing Stability: Not on file         Physical Exam:  Vitals/ General Appearance:   Weight/BMI: Body mass index is 42.19 kg/m².  /81   Pulse 92   Temp 36.3 °C (97.4 °F) (Temporal)   Resp 20   Ht 1.753 m (5' 9\")   Wt (!) 130 kg (285 lb 11.5 oz)   SpO2 96%   Vitals:    11/19/24 1000   BP: 118/81   Pulse: 92   Resp: 20   Temp: 36.3 °C (97.4 °F)   TempSrc: Temporal   SpO2: 96%   Weight: (!) 130 kg (285 lb 11.5 oz)   Height: 1.753 m (5' 9\")     Oxygen Therapy:  Pulse Oximetry: 96 %, O2 (LPM): 0, O2 Delivery Device: None - Room Air    Constitutional:   No acute distress  HENMT:  Normocephalic, Atraumatic.  Eyes:  EOMI, No discharge.  Neck:  no JVD.  Cardiovascular:  Normal heart rate, Normal rhythm.  Extremitites with intact distal pulses, no cyanosis, or edema.  Lungs:  No respiratory distress.  Abdomen: Soft, No tenderness, No guarding, No rebound.  Skin: No significant rash.  Neurologic: Alert & oriented x 3, No focal deficits noted, cranial nerves II through X are intact.  Psychiatric: Affect normal, Judgment normal, Mood " normal.      MDM (Data Review):     Records reviewed and summarized in current documentation    Lab Data Review:  Recent Results (from the past 24 hours)   EKG    Collection Time: 24  9:41 AM   Result Value Ref Range    Report       [facility]    Test Date:  2024  Pt Name:    CINTHIA JHA    Department: [facility]  MRN:        [id number]                      Room:  Gender:     Male                         Technician: MARISOL  :        1960                   Requested By:ELEANOR REBOLLAR  Order #:    [id number]                    Reading MD: Eleanor Rebollar MD    Measurements  Intervals                                Axis  Rate:       93                           P:          64  IA:         118                          QRS:        -63  QRSD:       109                          T:          16  QT:         393  QTc:        489    Interpretive Statements  Sinus rhythm  Borderline short IA interval  Left anterior fascicular block  Abnormal R-wave progression, late transition  Borderline prolonged QT interval  Compared to ECG 11/15/2024 11:10:29  No significant changes  Electronically Signed On 2024 10:58:26 PST by Eleanor Rebollar MD     CBC WITHOUT DIFFERENTIAL    Collection Time: 24 10:35 AM   Result Value Ref Range    WBC 6.5 4.8 - 10.8 K/uL    RBC 5.22 4.70 - 6.10 M/uL    Hemoglobin 16.2 14.0 - 18.0 g/dL    Hematocrit 47.1 42.0 - 52.0 %    MCV 90.2 81.4 - 97.8 fL    MCH 31.0 27.0 - 33.0 pg    MCHC 34.4 32.3 - 36.5 g/dL    RDW 45.6 35.9 - 50.0 fL    Platelet Count 182 164 - 446 K/uL    MPV 10.9 9.0 - 12.9 fL       Imaging/Procedures Review:    Chest Xray:  Reviewed    EKG:   As in HPI.     MDM (Assessment and Plan):     Active Hospital Problems    Diagnosis     Typical atrial flutter (HCC) [I48.3]          Proceed to cardioversion. Risks and benefits were explained to patient.      Eleanor Rebollar MD.   Cardiology Inpatient Service.  [facility].  .  Veena Cueva.

## 2024-11-19 NOTE — OR NURSING
Tele pre-admit appointment completed with patient for procedure on 11/19/24.  Instruction sheet from [facility] reviewed with patient.    Patient stated he is bloodless.  This RN called Alfreda from cardiology to notify, left voicemail.   Patient verbalizes understanding of all instructions given. No further questions at this time.

## 2024-11-19 NOTE — DISCHARGE INSTRUCTIONS
Electrical Cardioversion, Care After     This sheet gives you information about how to care for yourself after your procedure. Your health care provider may also give you more specific instructions. If you have problems or questions, contact your health care provider.     What can I expect after the procedure?     After the procedure, it is common to have:      Some redness on the skin where the shocks were given.    Follow these instructions at home:      Do not drive for 24 hours if you were given a medicine to help you relax (sedative).     Take over-the-counter and prescription medicines only as told by your health care provider.     Ask your health care provider how to check your pulse. Check it often.     Rest for 48 hours after the procedure or as told by your health care provider.     Avoid or limit your caffeine use as told by your health care provider.     If you received anesthesia, need to have someone stay with you for 24 hours.    Contact a health care provider if:      You feel like your heart is beating too quickly or your pulse is not regular.     You have a serious muscle cramp that does not go away.    Get help right away if:      You have discomfort in your chest.     You are dizzy or you feel faint.     You have trouble breathing or you are short of breath.     Your speech is slurred.     You have trouble moving an arm or leg on one side of your body.     Your fingers or toes turn cold or blue.    Call to schedule follow-up appointment with cardiologist if one is not already made

## 2024-11-20 LAB
EKG IMPRESSION: NORMAL
EKG IMPRESSION: NORMAL

## 2024-11-20 PROCEDURE — 93010 ELECTROCARDIOGRAM REPORT: CPT | Performed by: INTERNAL MEDICINE

## 2024-11-30 ENCOUNTER — OFFICE VISIT (OUTPATIENT)
Dept: URGENT CARE | Facility: CLINIC | Age: 64
End: 2024-11-30
Payer: MEDICARE

## 2024-11-30 VITALS
OXYGEN SATURATION: 94 % | HEIGHT: 69 IN | DIASTOLIC BLOOD PRESSURE: 84 MMHG | HEART RATE: 64 BPM | SYSTOLIC BLOOD PRESSURE: 132 MMHG | RESPIRATION RATE: 14 BRPM | WEIGHT: 278.5 LBS | TEMPERATURE: 98.1 F | BODY MASS INDEX: 41.25 KG/M2

## 2024-11-30 DIAGNOSIS — J06.9 VIRAL URI WITH COUGH: ICD-10-CM

## 2024-11-30 DIAGNOSIS — J02.9 PHARYNGITIS, UNSPECIFIED ETIOLOGY: ICD-10-CM

## 2024-11-30 DIAGNOSIS — H61.22 IMPACTED CERUMEN OF LEFT EAR: ICD-10-CM

## 2024-11-30 LAB
FLUAV RNA SPEC QL NAA+PROBE: NEGATIVE
FLUBV RNA SPEC QL NAA+PROBE: NEGATIVE
RSV RNA SPEC QL NAA+PROBE: POSITIVE
S PYO DNA SPEC NAA+PROBE: NOT DETECTED
SARS-COV-2 RNA RESP QL NAA+PROBE: NEGATIVE

## 2024-11-30 ASSESSMENT — ENCOUNTER SYMPTOMS
SPUTUM PRODUCTION: 0
FEVER: 1
CARDIOVASCULAR NEGATIVE: 1
CHILLS: 1
COUGH: 1
SHORTNESS OF BREATH: 0

## 2024-11-30 ASSESSMENT — FIBROSIS 4 INDEX: FIB4 SCORE: 1.69

## 2024-11-30 NOTE — PROGRESS NOTES
"Subjective:   Juan Martel is a 64 y.o. male who presents for Pharyngitis (X4 days \" can feel wheezing\", cough, body aches, chills, fatigue, feels like both ears are plugged, and loss of appetite. )      HPI:  Looked after granddaughter on Monday, Tuesday started feeling ill. Granddaughter has been sickwith various viral ilnesses as well as lots of sick exposure.  Reports last 4 days he has been significantly fatigued with generalized bodyaches chills and tactile fever.  Lots of congestion and ear pressure.  Reports significant postnasal drip and green mucus.  Loss of appetite.  Has been basically staying at home and resting as he is so fatigued.  Trying to drink as much liquid as possible and he reports Tylenol does help with the fevers and chills and has improved his fatigue level since starting yesterday.  - Patient has a past medical history significant for pacemaker placement on 9/30 with repeat cardioversion 11/19.    Review of Systems   Constitutional:  Positive for chills, fever and malaise/fatigue.   HENT:  Positive for congestion.    Respiratory:  Positive for cough. Negative for sputum production and shortness of breath.    Cardiovascular: Negative.        Medications:    allopurinol Tabs  Magnesium Tabs  POTASSIMIN PO  rivaroxaban Tabs  sotalol    Allergies: Bloodless, Codeine, and Tramadol    Problem List: Juan Martel does not have any pertinent problems on file.    Surgical History:  Past Surgical History:   Procedure Laterality Date    OTHER CARDIAC SURGERY  12/20/2016    pacemaker x 2, last one placed 9/2024    OTHER ORTHOPEDIC SURGERY  06/01/1990    achilles tendon repair left foot    OTHER      7 cardioversions    OTHER      4 cardiac ablations       Past Social Hx: Juan Martel  reports that he has never smoked. He has never used smokeless tobacco. He reports current alcohol use of about 0.6 oz of alcohol per week. He reports that he does not use drugs.     Past Family Hx:  Juan" "[place] family history includes Cancer in his sister; Diabetes in his father; Heart Disease in his mother; No Known Problems in his brother; Stroke in his paternal grandmother; Stroke (age of onset: 65) in his father.     Problem list, medications, and allergies reviewed by myself today in Epic.     Objective:     /84   Pulse 64   Temp 36.7 °C (98.1 °F) (Temporal)   Resp 14   Ht 1.753 m (5' 9\")   Wt (!) 126 kg (278 lb 8 oz)   SpO2 94%   BMI 41.13 kg/m²     Physical Exam  Constitutional:       Appearance: Normal appearance.   HENT:      Head: Normocephalic and atraumatic.      Right Ear: Tympanic membrane normal.      Left Ear: Tympanic membrane normal. There is impacted cerumen.      Nose: Congestion present. No rhinorrhea.      Mouth/Throat:      Mouth: Mucous membranes are moist.      Pharynx: Posterior oropharyngeal erythema present. No oropharyngeal exudate.   Eyes:      Extraocular Movements: Extraocular movements intact.      Conjunctiva/sclera: Conjunctivae normal.   Cardiovascular:      Rate and Rhythm: Normal rate and regular rhythm.      Pulses: Normal pulses.      Heart sounds: Normal heart sounds.   Pulmonary:      Effort: Pulmonary effort is normal. No respiratory distress.      Breath sounds: Normal breath sounds. No rhonchi or rales.   Neurological:      Mental Status: He is alert.         Assessment/Plan:     Diagnosis and associated orders:     1. Impacted cerumen of left ear        2. Viral URI with cough  POCT CoV-2, Flu A/B, RSV by PCR      3. Pharyngitis, unspecified etiology  POCT GROUP A STREP, PCR         Comments/MDM:     1. Impacted cerumen of left ear  On initial evaluation patient with impacted cerumen of the left ear.  Patient underwent manual curette by myself with some removal of impacted cerumen.  No cerumen is still present on the tympanic membrane.  Colace was placed in patient's ear to soften and remainder of cerumen was removed via flush.  - External ear canal " appeared irritated after evaluation but no significant tympanic membrane redness, fluid level or any purulent discharge noted.    2. Viral URI with cough  3. Pharyngitis, unspecified etiology  Patient likely suffering from a viral URI with a cough though there is a presence of significant left-sided pharyngeal and tonsillar erythema we will run strep test as below as well as point-of-care COVID flu and RSV  - Supportive care discussed with patient including use of mucolytic such as Mucinex as well as expectorants such as dextromethorphan along with combating his congestion with Flonase and an antihistamine once daily.  - Continue with Tylenol and ibuprofen as needed.  - Return precautions discussed including development of shortness of breath, significant production with coughing, any acute changes and sinus pain/pressure or ear pain/pressure.  We discussed that viral illnesses typically last 5 days and he is nearing the end of expected illness.  If fever chills and symptoms progress longer than 2 weeks please return to clinic for additional evaluation.  Given patient's history of of A-fib if patient develops any signs of atrial fibrillation, chest pain, palpitations or weakness I recommend evaluation in the emergency department  - POCT CoV-2, Flu A/B, RSV by PCR : RSV positive  - POCT GROUP A STREP, PCR           Differential diagnosis, natural history, supportive care, and indications for immediate follow-up discussed.    Advised the patient to follow-up with the primary care physician for recheck, reevaluation, and consideration of further management.    Please note that this dictation was created using voice recognition software. I have made a reasonable attempt to correct obvious errors, but I expect that there are errors of grammar and possibly content that I did not discover before finalizing the note.    Neftaly Hanley M.D.

## 2024-12-08 ENCOUNTER — OFFICE VISIT (OUTPATIENT)
Dept: URGENT CARE | Facility: CLINIC | Age: 64
End: 2024-12-08
Payer: MEDICARE

## 2024-12-08 ENCOUNTER — APPOINTMENT (OUTPATIENT)
Dept: RADIOLOGY | Facility: IMAGING CENTER | Age: 64
End: 2024-12-08
Payer: MEDICARE

## 2024-12-08 VITALS
TEMPERATURE: 96.6 F | WEIGHT: 269 LBS | RESPIRATION RATE: 16 BRPM | HEIGHT: 67 IN | BODY MASS INDEX: 42.22 KG/M2 | DIASTOLIC BLOOD PRESSURE: 80 MMHG | HEART RATE: 60 BPM | OXYGEN SATURATION: 93 % | SYSTOLIC BLOOD PRESSURE: 126 MMHG

## 2024-12-08 DIAGNOSIS — B33.8 RSV INFECTION: ICD-10-CM

## 2024-12-08 DIAGNOSIS — J18.9 COMMUNITY ACQUIRED PNEUMONIA OF LEFT LOWER LOBE OF LUNG: ICD-10-CM

## 2024-12-08 PROCEDURE — 3074F SYST BP LT 130 MM HG: CPT

## 2024-12-08 PROCEDURE — 99214 OFFICE O/P EST MOD 30 MIN: CPT

## 2024-12-08 PROCEDURE — 71046 X-RAY EXAM CHEST 2 VIEWS: CPT | Mod: TC | Performed by: FAMILY MEDICINE

## 2024-12-08 PROCEDURE — 3079F DIAST BP 80-89 MM HG: CPT

## 2024-12-08 RX ORDER — PREDNISONE 10 MG/1
40 TABLET ORAL DAILY
Qty: 20 TABLET | Refills: 0 | Status: SHIPPED | OUTPATIENT
Start: 2024-12-08 | End: 2024-12-08

## 2024-12-08 RX ORDER — PREDNISONE 10 MG/1
40 TABLET ORAL DAILY
Qty: 20 TABLET | Refills: 0 | Status: SHIPPED | OUTPATIENT
Start: 2024-12-08 | End: 2024-12-13

## 2024-12-08 RX ORDER — DOXYCYCLINE 100 MG/1
100 CAPSULE ORAL 2 TIMES DAILY
Qty: 10 CAPSULE | Refills: 0 | Status: SHIPPED | OUTPATIENT
Start: 2024-12-08 | End: 2024-12-08

## 2024-12-08 RX ORDER — DOXYCYCLINE 100 MG/1
100 CAPSULE ORAL 2 TIMES DAILY
Qty: 10 CAPSULE | Refills: 0 | Status: SHIPPED | OUTPATIENT
Start: 2024-12-08 | End: 2024-12-13

## 2024-12-08 RX ORDER — DOXYCYCLINE 100 MG/1
100 CAPSULE ORAL 2 TIMES DAILY
Qty: 10 CAPSULE | Refills: 0 | Status: SHIPPED
Start: 2024-12-08 | End: 2024-12-08

## 2024-12-08 ASSESSMENT — FIBROSIS 4 INDEX: FIB4 SCORE: 1.69

## 2024-12-08 NOTE — PROGRESS NOTES
Verbal consent was acquired by the patient to use [employer] ambient listening note generation during this visit     Date: 12/08/24        Chief Complaint   Patient presents with    Congestion     Congested, shortness of breath x 1 week   Tested positive for RSV on 11/30 and is not feeling better          History of Present Illness  The patient is a 64-year-old male who presents to urgent care for evaluation of cough, congestion, and shortness of breath. He was diagnosed with RSV in the urgent care setting on 11/30/2024 and was advised to engage in supportive measures.    He has been taking Tylenol 500 mg three times a day and Mucinex but reports no improvement in his condition. He experiences difficulty in breathing, particularly when moving around, and has been wheezing for the past few days. He has been coughing up phlegm and reports chills, body aches, fevers, congestion, and a runny nose. He has had a fever of 102 degrees.    He has a history of pneumonia but reports no history of asthma, smoking, or COPD. He has had two cardioversions in 11/2024 and 09/2024. He is currently on Xarelto and testosterone.    He has been experiencing sleep disturbances, waking up three times a night to urinate.         ROS:    No severe shortness of breath   No Cardiac like chest pain, as discussed   As otherwise stated in HPI    Medical/SX/ Social History:  Reviewed per chart    Pertinent Medications:    Current Outpatient Medications on File Prior to Visit   Medication Sig Dispense Refill    sotalol (BETAPACE) 120 MG tablet Take 1 Tablet by mouth 2 times a day. 180 Tablet 0    allopurinol (ZYLOPRIM) 300 MG Tab Take 1 Tablet by mouth every day. LAST refill, due for annual follow up with PCP for further refills (Patient taking differently: Take 300 mg by mouth every day. LAST refill, due for annual follow up with PCP for further refills) 90 Tablet 0    rivaroxaban (XARELTO) 20 MG Tab tablet Take 1 Tablet by mouth with dinner. 90  Tablet 3    Potassium (POTASSIMIN PO) Take 2 Tablets by mouth every day. OTC strength      Magnesium 500 MG Tab Take 500 mg by mouth every day.          No current facility-administered medications on file prior to visit.        Allergies:    Bloodless, Fentanyl, Codeine, and Tramadol     Problem list, medications, and allergies reviewed by myself today in Epic     Physical Exam:    Vitals:    12/08/24 1203   BP: 126/80   Pulse: 60   Resp: 16   Temp: 35.9 °C (96.6 °F)   SpO2: 93%             Physical Exam  Vitals reviewed.   Constitutional:       General: He is not in acute distress.     Appearance: Normal appearance. He is normal weight. He is not ill-appearing or toxic-appearing.   HENT:      Head: Normocephalic and atraumatic.      Right Ear: Tympanic membrane, ear canal and external ear normal.      Left Ear: Tympanic membrane, ear canal and external ear normal.      Nose: Congestion and rhinorrhea present.      Mouth/Throat:      Mouth: Mucous membranes are moist.   Eyes:      General:         Right eye: No discharge.         Left eye: No discharge.      Extraocular Movements: Extraocular movements intact.      Pupils: Pupils are equal, round, and reactive to light.   Cardiovascular:      Rate and Rhythm: Normal rate and regular rhythm.      Pulses: Normal pulses.      Heart sounds: Normal heart sounds.   Pulmonary:      Effort: Pulmonary effort is normal. No accessory muscle usage or prolonged expiration.      Breath sounds: Normal air entry. Examination of the left-lower field reveals rales. Rales present. No decreased breath sounds, wheezing or rhonchi.   Abdominal:      General: Abdomen is flat.      Palpations: Abdomen is soft.   Musculoskeletal:         General: Normal range of motion.      Cervical back: Normal range of motion and neck supple.   Skin:     General: Skin is warm and dry.   Neurological:      General: No focal deficit present.      Mental Status: He is alert and oriented to person, place,  and time. Mental status is at baseline.   Psychiatric:         Mood and Affect: Mood normal.         Behavior: Behavior normal.         Thought Content: Thought content normal.            Diagnostics:    X-ray images viewed and interpreted by APRN, confirmed by radiology:        RADIOLOGY RESULTS   DX-CHEST-2 VIEWS    Result Date: 12/8/2024 12/8/2024 12:25 PM HISTORY/REASON FOR EXAM: Cough. TECHNIQUE/EXAM DESCRIPTION AND NUMBER OF VIEWS: Two views of the chest. COMPARISON:  11/18/2022 FINDINGS: Dual lead left subclavian pacemaker is again noted. The cardiomediastinal silhouette is stable. There is new infiltrate in the left perihilar region with atelectasis versus infiltrate at the lung bases. There is no definite pleural effusion or pneumothorax. There is mild anterior wedging of a mid to lower thoracic vertebral body.    Left perihilar infiltrate with additional atelectasis versus infiltrate at the lung bases.             Medical Decision making and plan :  I personally reviewed prior external notes and test results pertinent to today's visit. Pt is clinically stable at today's acute urgent care visit.  Patient appears nontoxic with no acute distress noted. Appropriate for outpatient care at this time.    Pleasant 64 y.o. male presented clinic with:     Assessment & Plan  1. Community-acquired pneumonia.  The patient's symptoms and clinical presentation, including cough, congestion, shortness of breath, chills, body aches, and fever, are consistent with community-acquired pneumonia. A chest x-ray demonstrates bilateral atelectasis with left perihilar opacity, highly suspicious for community-acquired pneumonia. He will be treated with dual antibiotic therapy with Augmentin and doxycycline. He is prescribed 40 mg of prednisone to decrease inflammation. He is advised to take the steroids with a full meal, preferably in the morning, and to take the first dose tonight. He is also advised to continue taking Mucinex to  help mobilize secretions, drink plenty of water, and apply chest rubs. If symptoms worsen or do not improve, he should seek immediate medical attention at the emergency room for possible IV antibiotics.    2. Respiratory Syncytial Virus (RSV) infection.  The patient was diagnosed with RSV on 11/30/2024 and was advised to engage in supportive measures. He reports taking Tylenol and Mucinex, but his symptoms have not improved. He is experiencing wheezing and difficulty breathing. He is advised to continue supportive care, including rest, staying active, taking deep breaths, coughing, and mobilizing secretions. He is also prescribed 40 mg of prednisone to decrease inflammation and advised to avoid ibuprofen while on this medication. Gabapentin has been prescribed to manage neuropathic pain, to be taken up to three times a day, with the first dose at bedtime due to its potential to cause drowsiness. He should avoid driving or operating heavy machinery until the effects of the medication are known.    Follow-up  Return in 1 week for reevaluation.      Shared decision-making was utilized with patient for treatment plan. Medication discussed included indication for use and the potential benefits and side effects. Education was provided regarding the aforementioned assessments.  Differential Diagnosis, natural history, and supportive care discussed. All of the patient's questions were answered to their satisfaction at the time of discharge. Patient was encouraged to monitor symptoms closely. Those signs and symptoms which would warrant concern and mandate seeking a higher level of service through the emergency department discussed at length.  Patient stated agreement and understanding of this plan of care.    Disposition:  Home in stable condition     Voice Recognition Disclaimer:  Portions of this document were created using voice recognition software and [employer] technology provided by [facility]. The software does have a  chance of producing errors of grammar and possibly content. I have made every reasonable attempt to correct obvious errors, but there may be errors of grammar and possibly content that I did not discover before finalizing the  documentation.    SUE Harmon.

## 2024-12-08 NOTE — PATIENT INSTRUCTIONS
Community-Acquired Pneumonia, Adult  Pneumonia is a lung infection that causes inflammation and the buildup of mucus and fluids in the lungs. This may cause coughing and difficulty breathing. Community-acquired pneumonia is pneumonia that develops in people who are not, and have not recently been, in a hospital or other health care facility.  Usually, pneumonia develops as a result of an illness that is caused by a virus, such as the common cold and the flu (influenza). It can also be caused by bacteria or fungi. While the common cold and influenza can pass from person to person (are contagious), pneumonia itself is not considered contagious.  What are the causes?  This condition may be caused by:  Viruses.  Bacteria.  Fungi.  What increases the risk?  The following factors may make you more likely to develop this condition:  Being over age 65 or having certain medical conditions, such as:  A long-term (chronic) disease, such as: chronic obstructive pulmonary disease (COPD), asthma, heart failure, diabetes, or kidney disease.  A condition that increases the risk of breathing in (aspirating) mucus and other fluids from your mouth and nose.  A weakened body defense system (immune system).  Having had your spleen removed (splenectomy). The spleen is the organ that helps fight germs and infections.  Not cleaning your teeth and gums well (poor dental hygiene).  Using tobacco products.  Traveling to places where germs that cause pneumonia are present or being near certain animals or animal habitats that could have germs that cause pneumonia.  What are the signs or symptoms?  Symptoms of this condition include:  A dry cough or a wet (productive) cough.  A fever, sweating, or chills.  Chest pain, especially when breathing deeply or coughing.  Fast breathing, difficulty breathing, or shortness of breath.  Tiredness (fatigue) and muscle aches.  How is this diagnosed?  This condition may be diagnosed based on your medical  history or a physical exam. You may also have tests, including:  Imaging, such as a chest X-ray or lung ultrasound.  Tests of:  The level of oxygen and other gases in your blood.  Mucus from your lungs (sputum).  Fluid around your lungs (pleural fluid).  Your urine.  How is this treated?  Treatment for this condition depends on many factors, such as the cause of your pneumonia, your medicines, and other medical conditions that you have.  For most adults, pneumonia may be treated at home. In some cases, treatment must happen in a hospital and may include:  Medicines that are given by mouth (orally) or through an IV, including:  Antibiotic medicines, if bacteria caused the pneumonia.  Medicines that kill viruses (antiviral medicines), if a virus caused the pneumonia.  Oxygen therapy.  Severe pneumonia, although rare, may require the following treatments:  Mechanical ventilation.This procedure uses a machine to help you breathe if you cannot breathe well on your own or maintain a safe level of blood oxygen.  Thoracentesis. This procedure removes any buildup of pleural fluid to help with breathing.  Follow these instructions at home:    Medicines  Take over-the-counter and prescription medicines only as told by your health care provider.  Take cough medicine only if you have trouble sleeping. Cough medicine can prevent your body from removing mucus from your lungs.  If you were prescribed antibiotics, take them as told by your health care provider. Do not stop taking the antibiotic even if you start to feel better.  Lifestyle         Do not drink alcohol.  Do not use any products that contain nicotine or tobacco. These products include cigarettes, chewing tobacco, and vaping devices, such as e-cigarettes. If you need help quitting, ask your health care provider.  Eat a healthy diet. This includes plenty of vegetables, fruits, whole grains, low-fat dairy products, and lean protein.  General instructions  Rest a lot and  get at least 8 hours of sleep each night.  Sleep in a partly upright position at night. Place a few pillows under your head or sleep in a reclining chair.  Return to your normal activities as told by your health care provider. Ask your health care provider what activities are safe for you.  Drink enough fluid to keep your urine pale yellow. This helps to thin the mucus in your lungs.  If your throat is sore, gargle with a mixture of salt and water 3-4 times a day or as needed. To make salt water, completely dissolve ½-1 tsp (3-6 g) of salt in 1 cup (237 mL) of warm water.  Keep all follow-up visits.  How is this prevented?  You can lower your risk of developing community-acquired pneumonia by:  Getting the pneumonia vaccine. There are different types and schedules of pneumonia vaccines. Ask your health care provider which option is best for you. Consider getting the pneumonia vaccine if:  You are older than 65 years of age.  You are 19-65 years of age and are receiving cancer treatment, have chronic lung disease, or have other medical conditions that affect your immune system. Ask your health care provider if this applies to you.  Getting your influenza vaccine every year. Ask your health care provider which type of vaccine is best for you.  Getting regular dental checkups.  Washing your hands often with soap and water for at least 20 seconds. If soap and water are not available, use hand .  Contact a health care provider if:  You have a fever.  You have trouble sleeping because you cannot control your cough with cough medicine.  Get help right away if:  Your shortness of breath becomes worse.  Your chest pain increases.  Your sickness becomes worse, especially if you are an older adult or have a weak immune system.  You cough up blood.  These symptoms may be an emergency. Get help right away. Call 911.  Do not wait to see if the symptoms will go away.  Do not drive yourself to the  hospital.  Summary  Pneumonia is an infection of the lungs.  Community-acquired pneumonia develops in people who have not been in the hospital. It can be caused by bacteria, viruses, or fungi.  This condition may be treated with antibiotics or antiviral medicines.  Severe pneumonia may require a hospital stay and treatment to help with breathing.  This information is not intended to replace advice given to you by your health care provider. Make sure you discuss any questions you have with your health care provider.  Document Revised: 02/15/2023 Document Reviewed: 02/15/2023  Elsevier Patient Education © 2023 [employer]    Cough, Adult  Coughing is a reflex that clears your throat and your airways (respiratory system). Coughing helps to heal and protect your lungs. It is normal to cough occasionally, but a cough that happens with other symptoms or lasts a long time may be a sign of a condition that needs treatment. An acute cough may only last 2-3 weeks, while a chronic cough may last 8 or more weeks.  Coughing is commonly caused by:  Infection of the respiratory systemby viruses or bacteria.  Breathing in substances that irritate your lungs.  Allergies.  Asthma.  Mucus that runs down the back of your throat (postnasal drip).  Smoking.  Acid backing up from the stomach into the esophagus (gastroesophageal reflux).  Certain medicines.  Chronic lung problems.  Other medical conditions such as heart failure or a blood clot in the lung (pulmonary embolism).  Follow these instructions at home:  Medicines  Take over-the-counter and prescription medicines only as told by your health care provider.  Talk with your health care provider before you take a cough suppressant medicine.  Lifestyle    Avoid cigarette smoke. Do not use any products that contain nicotine or tobacco, such as cigarettes, e-cigarettes, and chewing tobacco. If you need help quitting, ask your health care provider.  Drink enough fluid to keep your urine  pale yellow.  Avoid caffeine.  Do not drink alcohol if your health care provider tells you not to drink.  General instructions    Pay close attention to changes in your cough. Tell your health care provider about them.  Always cover your mouth when you cough.  Avoid things that make you cough, such as perfume, candles, cleaning products, or campfire or tobacco smoke.  If the air is dry, use a cool mist vaporizer or humidifier in your bedroom or your home to help loosen secretions.  If your cough is worse at night, try to sleep in a semi-upright position.  Rest as needed.  Keep all follow-up visits as told by your health care provider. This is important.  Contact a health care provider if you:  Have new symptoms.  Cough up pus.  Have a cough that does not get better after 2-3 weeks or gets worse.  Cannot control your cough with cough suppressant medicines and you are losing sleep.  Have pain that gets worse or pain that is not helped with medicine.  Have a fever.  Have unexplained weight loss.  Have night sweats.  Get help right away if:  You cough up blood.  You have difficulty breathing.  Your heartbeat is very fast.  These symptoms may represent a serious problem that is an emergency. Do not wait to see if the symptoms will go away. Get medical help right away. Call your local emergency services (911 in the [place] Do not drive yourself to the hospital.  Summary  Coughing is a reflex that clears your throat and your airways. It is normal to cough occasionally, but a cough that happens with other symptoms or lasts a long time may be a sign of a condition that needs treatment.  Take over-the-counter and prescription medicines only as told by your health care provider.  Always cover your mouth when you cough.  Contact a health care provider if you have new symptoms or a cough that does not get better after 2-3 weeks or gets worse.  This information is not intended to replace advice given to you by your health care  provider. Make sure you discuss any questions you have with your health care provider.  Document Revised: 01/06/2020 Document Reviewed: 01/06/2020  Elsevier Patient Education © 2023 [employer]

## 2024-12-22 LAB — EKG IMPRESSION: NORMAL

## 2024-12-23 LAB — EKG IMPRESSION: NORMAL

## 2024-12-30 ENCOUNTER — NON-PROVIDER VISIT (OUTPATIENT)
Dept: CARDIOLOGY | Facility: MEDICAL CENTER | Age: 64
End: 2024-12-30
Payer: MEDICARE

## 2025-01-02 PROCEDURE — 93294 REM INTERROG EVL PM/LDLS PM: CPT | Performed by: INTERNAL MEDICINE

## 2025-01-02 NOTE — CARDIAC REMOTE MONITOR - SCAN
Device transmission reviewed. Device demonstrated appropriate function.       Electronically Signed by: Alexi Faith M.D.    1/3/2025  10:29 AM

## 2025-01-15 ENCOUNTER — HOSPITAL ENCOUNTER (OUTPATIENT)
Dept: LAB | Facility: MEDICAL CENTER | Age: 65
End: 2025-01-15
Attending: NURSE PRACTITIONER
Payer: MEDICARE

## 2025-01-15 DIAGNOSIS — Z13.0 ENCOUNTER FOR SCREENING FOR HEMATOLOGIC DISORDER: ICD-10-CM

## 2025-01-15 DIAGNOSIS — E55.9 VITAMIN D DEFICIENCY: ICD-10-CM

## 2025-01-15 DIAGNOSIS — Z13.29 SCREENING FOR THYROID DISORDER: ICD-10-CM

## 2025-01-15 DIAGNOSIS — Z12.5 SCREENING PSA (PROSTATE SPECIFIC ANTIGEN): ICD-10-CM

## 2025-01-15 DIAGNOSIS — Z13.228 ENCOUNTER FOR SCREENING FOR METABOLIC DISORDER: ICD-10-CM

## 2025-01-15 DIAGNOSIS — M1A.0710 IDIOPATHIC CHRONIC GOUT OF RIGHT FOOT WITHOUT TOPHUS: ICD-10-CM

## 2025-01-15 DIAGNOSIS — R73.03 PREDIABETES: ICD-10-CM

## 2025-01-15 DIAGNOSIS — E78.00 PURE HYPERCHOLESTEROLEMIA: ICD-10-CM

## 2025-01-15 LAB
25(OH)D3 SERPL-MCNC: 9 NG/ML (ref 30–100)
ALBUMIN SERPL BCP-MCNC: 4 G/DL (ref 3.2–4.9)
ALBUMIN/GLOB SERPL: 1.3 G/DL
ALP SERPL-CCNC: 78 U/L (ref 30–99)
ALT SERPL-CCNC: 32 U/L (ref 2–50)
ANION GAP SERPL CALC-SCNC: 8 MMOL/L (ref 7–16)
AST SERPL-CCNC: 21 U/L (ref 12–45)
BASOPHILS # BLD AUTO: 0.6 % (ref 0–1.8)
BASOPHILS # BLD: 0.04 K/UL (ref 0–0.12)
BILIRUB SERPL-MCNC: 0.6 MG/DL (ref 0.1–1.5)
BUN SERPL-MCNC: 16 MG/DL (ref 8–22)
CALCIUM ALBUM COR SERPL-MCNC: 9.4 MG/DL (ref 8.5–10.5)
CALCIUM SERPL-MCNC: 9.4 MG/DL (ref 8.5–10.5)
CHLORIDE SERPL-SCNC: 105 MMOL/L (ref 96–112)
CHOLEST SERPL-MCNC: 183 MG/DL (ref 100–199)
CO2 SERPL-SCNC: 26 MMOL/L (ref 20–33)
CREAT SERPL-MCNC: 0.92 MG/DL (ref 0.5–1.4)
EOSINOPHIL # BLD AUTO: 0.16 K/UL (ref 0–0.51)
EOSINOPHIL NFR BLD: 2.4 % (ref 0–6.9)
ERYTHROCYTE [DISTWIDTH] IN BLOOD BY AUTOMATED COUNT: 47.9 FL (ref 35.9–50)
EST. AVERAGE GLUCOSE BLD GHB EST-MCNC: 123 MG/DL
FASTING STATUS PATIENT QL REPORTED: NORMAL
GFR SERPLBLD CREATININE-BSD FMLA CKD-EPI: 91 ML/MIN/1.73 M 2
GLOBULIN SER CALC-MCNC: 3.2 G/DL (ref 1.9–3.5)
GLUCOSE SERPL-MCNC: 103 MG/DL (ref 65–99)
HBA1C MFR BLD: 5.9 % (ref 4–5.6)
HCT VFR BLD AUTO: 45.2 % (ref 42–52)
HDLC SERPL-MCNC: 30 MG/DL
HGB BLD-MCNC: 14.9 G/DL (ref 14–18)
IMM GRANULOCYTES # BLD AUTO: 0.01 K/UL (ref 0–0.11)
IMM GRANULOCYTES NFR BLD AUTO: 0.2 % (ref 0–0.9)
LDLC SERPL CALC-MCNC: 127 MG/DL
LYMPHOCYTES # BLD AUTO: 2.85 K/UL (ref 1–4.8)
LYMPHOCYTES NFR BLD: 43.4 % (ref 22–41)
MCH RBC QN AUTO: 30.2 PG (ref 27–33)
MCHC RBC AUTO-ENTMCNC: 33 G/DL (ref 32.3–36.5)
MCV RBC AUTO: 91.7 FL (ref 81.4–97.8)
MONOCYTES # BLD AUTO: 0.64 K/UL (ref 0–0.85)
MONOCYTES NFR BLD AUTO: 9.7 % (ref 0–13.4)
NEUTROPHILS # BLD AUTO: 2.87 K/UL (ref 1.82–7.42)
NEUTROPHILS NFR BLD: 43.7 % (ref 44–72)
NRBC # BLD AUTO: 0 K/UL
NRBC BLD-RTO: 0 /100 WBC (ref 0–0.2)
PLATELET # BLD AUTO: 191 K/UL (ref 164–446)
PMV BLD AUTO: 11.1 FL (ref 9–12.9)
POTASSIUM SERPL-SCNC: 4.7 MMOL/L (ref 3.6–5.5)
PROT SERPL-MCNC: 7.2 G/DL (ref 6–8.2)
PSA SERPL DL<=0.01 NG/ML-MCNC: 0.65 NG/ML (ref 0–4)
RBC # BLD AUTO: 4.93 M/UL (ref 4.7–6.1)
SODIUM SERPL-SCNC: 139 MMOL/L (ref 135–145)
TRIGL SERPL-MCNC: 132 MG/DL (ref 0–149)
TSH SERPL DL<=0.005 MIU/L-ACNC: 1.53 UIU/ML (ref 0.38–5.33)
URATE SERPL-MCNC: 8.6 MG/DL (ref 2.5–8.3)
WBC # BLD AUTO: 6.6 K/UL (ref 4.8–10.8)

## 2025-01-15 PROCEDURE — 85025 COMPLETE CBC W/AUTO DIFF WBC: CPT

## 2025-01-15 PROCEDURE — 36415 COLL VENOUS BLD VENIPUNCTURE: CPT

## 2025-01-15 PROCEDURE — 84443 ASSAY THYROID STIM HORMONE: CPT

## 2025-01-15 PROCEDURE — 83036 HEMOGLOBIN GLYCOSYLATED A1C: CPT | Mod: GA

## 2025-01-15 PROCEDURE — 80061 LIPID PANEL: CPT

## 2025-01-15 PROCEDURE — 84153 ASSAY OF PSA TOTAL: CPT | Mod: GA

## 2025-01-15 PROCEDURE — 82306 VITAMIN D 25 HYDROXY: CPT

## 2025-01-15 PROCEDURE — 84550 ASSAY OF BLOOD/URIC ACID: CPT

## 2025-01-15 PROCEDURE — 80053 COMPREHEN METABOLIC PANEL: CPT

## 2025-01-20 ENCOUNTER — APPOINTMENT (OUTPATIENT)
Dept: MEDICAL GROUP | Facility: MEDICAL CENTER | Age: 65
End: 2025-01-20
Payer: MEDICARE

## 2025-01-20 VITALS
HEART RATE: 56 BPM | BODY MASS INDEX: 41.18 KG/M2 | TEMPERATURE: 97 F | DIASTOLIC BLOOD PRESSURE: 84 MMHG | SYSTOLIC BLOOD PRESSURE: 126 MMHG | OXYGEN SATURATION: 95 % | HEIGHT: 69 IN | WEIGHT: 278 LBS

## 2025-01-20 DIAGNOSIS — Z23 NEED FOR VACCINATION: ICD-10-CM

## 2025-01-20 DIAGNOSIS — I48.19 OTHER PERSISTENT ATRIAL FIBRILLATION (HCC): ICD-10-CM

## 2025-01-20 DIAGNOSIS — D68.59 HYPERCOAGULABLE STATE (HCC): Chronic | ICD-10-CM

## 2025-01-20 DIAGNOSIS — Z86.79 S/P ABLATION OF ATRIAL FIBRILLATION: ICD-10-CM

## 2025-01-20 DIAGNOSIS — R73.03 PREDIABETES: ICD-10-CM

## 2025-01-20 DIAGNOSIS — G47.33 OSA (OBSTRUCTIVE SLEEP APNEA): ICD-10-CM

## 2025-01-20 DIAGNOSIS — E66.01 MORBID OBESITY WITH BMI OF 40.0-44.9, ADULT (HCC): ICD-10-CM

## 2025-01-20 DIAGNOSIS — Z00.00 MEDICARE ANNUAL WELLNESS VISIT, INITIAL: ICD-10-CM

## 2025-01-20 DIAGNOSIS — E78.00 PURE HYPERCHOLESTEROLEMIA: ICD-10-CM

## 2025-01-20 DIAGNOSIS — Z95.0 CARDIAC PACEMAKER IN SITU: ICD-10-CM

## 2025-01-20 DIAGNOSIS — M1A.0710 IDIOPATHIC CHRONIC GOUT OF RIGHT FOOT WITHOUT TOPHUS: ICD-10-CM

## 2025-01-20 DIAGNOSIS — Z12.12 SCREENING FOR COLORECTAL CANCER: ICD-10-CM

## 2025-01-20 DIAGNOSIS — I49.5 SSS (SICK SINUS SYNDROME) (HCC): ICD-10-CM

## 2025-01-20 DIAGNOSIS — Z98.890 S/P ABLATION OF ATRIAL FIBRILLATION: ICD-10-CM

## 2025-01-20 DIAGNOSIS — Z12.11 SCREENING FOR COLORECTAL CANCER: ICD-10-CM

## 2025-01-20 DIAGNOSIS — E55.9 VITAMIN D DEFICIENCY: ICD-10-CM

## 2025-01-20 DIAGNOSIS — I48.3 TYPICAL ATRIAL FLUTTER (HCC): ICD-10-CM

## 2025-01-20 PROBLEM — R11.2 PONV (POSTOPERATIVE NAUSEA AND VOMITING): Status: RESOLVED | Noted: 2023-09-11 | Resolved: 2025-01-20

## 2025-01-20 PROCEDURE — 3074F SYST BP LT 130 MM HG: CPT | Performed by: NURSE PRACTITIONER

## 2025-01-20 PROCEDURE — 3079F DIAST BP 80-89 MM HG: CPT | Performed by: NURSE PRACTITIONER

## 2025-01-20 PROCEDURE — 90677 PCV20 VACCINE IM: CPT | Performed by: NURSE PRACTITIONER

## 2025-01-20 PROCEDURE — G0439 PPPS, SUBSEQ VISIT: HCPCS | Mod: 25 | Performed by: NURSE PRACTITIONER

## 2025-01-20 PROCEDURE — G0009 ADMIN PNEUMOCOCCAL VACCINE: HCPCS | Performed by: NURSE PRACTITIONER

## 2025-01-20 RX ORDER — ALLOPURINOL 300 MG/1
300 TABLET ORAL
Qty: 90 TABLET | Refills: 3 | Status: SHIPPED | OUTPATIENT
Start: 2025-01-20

## 2025-01-20 RX ORDER — ERGOCALCIFEROL 1.25 MG/1
50000 CAPSULE ORAL
Qty: 12 CAPSULE | Refills: 0 | Status: SHIPPED | OUTPATIENT
Start: 2025-01-20

## 2025-01-20 RX ORDER — SOTALOL HYDROCHLORIDE 80 MG/1
80 TABLET ORAL 2 TIMES DAILY
COMMUNITY
Start: 2024-12-21

## 2025-01-20 ASSESSMENT — PATIENT HEALTH QUESTIONNAIRE - PHQ9: CLINICAL INTERPRETATION OF PHQ2 SCORE: 0

## 2025-01-20 ASSESSMENT — ACTIVITIES OF DAILY LIVING (ADL): BATHING_REQUIRES_ASSISTANCE: 0

## 2025-01-20 ASSESSMENT — FIBROSIS 4 INDEX: FIB4 SCORE: 1.28

## 2025-01-20 ASSESSMENT — ENCOUNTER SYMPTOMS: GENERAL WELL-BEING: GOOD

## 2025-01-20 NOTE — PROGRESS NOTES
Chief Complaint   Patient presents with    Annual Exam       HPI:  Juan Martel is a 64 y.o. here for Medicare Annual Wellness Visit     History of Present Illness  The patient presents for evaluation of atrial fibrillation, prediabetes, gout, sleep apnea, and health maintenance.    He has been under the care of cardiology for his atrial fibrillation, with a new pacemaker recently implanted. He underwent cardioversion in 11/2023. He reports no chest pain or shortness of breath during exertion. He maintains an active lifestyle, including regular gym workouts. He experienced a recurrence of atrial fibrillation in 07/2024, which he attributes to dehydration while traveling in [place] and [place]. This episode was followed by another cardioversion in 08/2024 and pacemaker implantation in 09/2024. He recalls being informed about an atrial septal defect during his last atrial fibrillation episode, which was affecting the lower half of his heart. He is uncertain if this is a congenital condition or a recent development. He notes that his heart rate accelerates significantly during atrial fibrillation episodes. He is currently on sotalol and Xarelto, prescribed by his cardiologist.    He has observed a slight elevation in his A1c levels. He is making efforts to improve his diet and exercise regimen. His daughter, who is also diabetic, is assisting him in these efforts. He has resumed his gym routine and is maintaining a healthy lifestyle.    He has a history of gout and elevated uric acid levels. He has been off allopurinol for a month due to a lapse in prescription renewal. He is seeking a refill of his allopurinol prescription. He has a history of kidney stones, dating back to 2016.    He consistently uses his CPAP machine, even during naps, and reports feeling significantly better since its use. He does not experience fatigue.    He has never undergone a colonoscopy. He reports no bleeding issues or hemorrhoids. He  recalls receiving a tetanus shot in the past, possibly within the last 10 years. He is interested in receiving his second pneumonia vaccine. He reports no headaches, abdominal pain, constipation, diarrhea, urinary changes, difficulty urinating, ankle swelling, or numbness. He had an earwax impaction, which was cleaned out at the urgent care.    He is not currently on any cholesterol medication. He has not discussed cholesterol management with his cardiologist. He is making efforts to stay active.    Supplemental Information  He had pneumonia last month and was treated with antibiotics by nurse practitioner Harjinder. He had a sore throat for a while, but he did not go to the doctor. He finally has been feeling really good this last week. He is pretty much back to normal for the first time in a while.    SOCIAL HISTORY  He does not smoke. He does not drink heavily.    FAMILY HISTORY  His mother and grandmother both have a history of atrial fibrillation. His father had a stroke and diabetes. His real grandfather struggled with alcohol and also had a stroke.    MEDICATIONS  Current: Sotalol, Xarelto, magnesium, potassium.  Discontinued: Allopurinol.    IMMUNIZATIONS  He received his first pneumonia vaccine in 2019. He is due for his second pneumonia vaccine. He has kept up with all COVID-19 vaccinations.        Patient Active Problem List    Diagnosis Date Noted    Typical atrial flutter ([facility]) 09/11/2023    Hypercoagulable state ([facility]) 09/11/2023    PONV (postoperative nausea and vomiting) 09/11/2023    Postoperative nausea- fentanyl/versed     SSS (sick sinus syndrome) ([facility]) 03/07/2023    Other persistent atrial fibrillation ([facility]) 01/12/2023    Prediabetes 10/24/2022    Pure hypercholesterolemia 10/24/2022    Pacemaker lead malfunction 08/29/2022    Morbid obesity with BMI of 40.0-44.9, adult ([facility]) 05/26/2020    Family history of diabetes mellitus (DM) 03/13/2019    Idiopathic chronic gout of right foot without tophus  03/13/2019    S/P ablation of atrial fibrillation 02/02/2017    Cardiac pacemaker in situ 12/20/2016    ARCHANA (obstructive sleep apnea) 06/15/2016    History of atrial fibrillation 04/18/2016       Current Outpatient Medications   Medication Sig Dispense Refill    sotalol (BETAPACE) 120 MG tablet Take 1 Tablet by mouth 2 times a day. 180 Tablet 0    allopurinol (ZYLOPRIM) 300 MG Tab Take 1 Tablet by mouth every day. LAST refill, due for annual follow up with PCP for further refills (Patient taking differently: Take 300 mg by mouth every day. LAST refill, due for annual follow up with PCP for further refills) 90 Tablet 0    rivaroxaban (XARELTO) 20 MG Tab tablet Take 1 Tablet by mouth with dinner. 90 Tablet 3    Potassium (POTASSIMIN PO) Take 2 Tablets by mouth every day. OTC strength      Magnesium 500 MG Tab Take 500 mg by mouth every day.          No current facility-administered medications for this visit.          Current supplements as per medication list.     Allergies: Bloodless, Fentanyl, Codeine, and Tramadol    Current social contact/activities: go to gym 3-4x per week, volunteer work with [employer]     He  reports that he has never smoked. He has never used smokeless tobacco. He reports that he does not currently use alcohol after a past usage of about 0.6 oz of alcohol per week. He reports that he does not use drugs.  Counseling given: Not Answered      ROS:    Gait: Uses no assistive device  Ostomy: No  Other tubes: No  Amputations: No  Chronic oxygen use: No  Last eye exam: 3 years ago  Wears hearing aids: No   : Denies any urinary leakage during the last 6 months    Screening:    Depression Screening  Little interest or pleasure in doing things?  0 - not at all  Feeling down, depressed , or hopeless? 0 - not at all  Patient Health Questionnaire Score: 0     If depressive symptoms identified deferred to follow up visit unless specifically addressed in assessment and plan.    Interpretation of PHQ-9 Total  Score   Score Severity   1-4 No Depression   5-9 Mild Depression   10-14 Moderate Depression   15-19 Moderately Severe Depression   20-27 Severe Depression    Screening for Cognitive Impairment  Do you or any of your friends or family members have any concern about your memory? No  Three Minute Recall (Leader, Season, Table) 0/3    Joel clock face with all 12 numbers and set the hands to show 10 minutes after 11.  Yes    Cognitive concerns identified deferred for follow up unless specifically addressed in assessment and plan.    Fall Risk Assessment  Has the patient had two or more falls in the last year or any fall with injury in the last year?  No    Safety Assessment  Do you always wear your seatbelt?  Yes  Any changes to home needed to function safely? No  Difficulty hearing.  No  Patient counseled about all safety risks that were identified.    Functional Assessment ADLs  Are there any barriers preventing you from cooking for yourself or meeting nutritional needs?  No.    Are there any barriers preventing you from driving safely or obtaining transportation?  No.    Are there any barriers preventing you from using a telephone or calling for help?  No    Are there any barriers preventing you from shopping?  No.    Are there any barriers preventing you from taking care of your own finances?  No    Are there any barriers preventing you from managing your medications?  No    Are there any barriers preventing you from showering, bathing or dressing yourself? No    Are there any barriers preventing you from doing housework or laundry? No  Are there any barriers preventing you from using the toilet?No  Are you currently engaging in any exercise or physical activity?  Yes.      Self-Assessment of Health  What is your perception of your health? Good    Do you sleep more than six hours a night? Yes    In the past 7 days, how much did pain keep you from doing your normal work? None    Do you spend quality time with family  or friends (virtually or in person)? Yes    Do you usually eat a heart healthy diet that constists of a variety of fruits, vegetables, whole grains and fiber? Yes    Do you eat foods high in fat and/or Fast Food more than three times per week? Yes Fast food 3x a week  How concerned are you that your medical conditions are not being well managed? Not at all    Are you worried that in the next 2 months, you may not have stable housing that you own, rent, or stay in as part of a household? No      Advance Care Planning  Do you have an Advance Directive, Living Will, Durable Power of , or POLST? Yes  Advance Directive   Durable Power of    is on file      Health Maintenance Summary            Overdue - Hepatitis C Screening (Once) Never done      No completion history exists for this topic.              Overdue - IMM DTaP/Tdap/Td Vaccine (1 - Tdap) Never done      No completion history exists for this topic.              Overdue - Colorectal Cancer Screening (View Topic Details) Never done      No completion history exists for this topic.              Overdue - Zoster (Shingles) Vaccines (1 of 2) Never done      No completion history exists for this topic.              Overdue - Pneumococcal Vaccine: 65+ Years (2 of 2 - PCV) Overdue since 3/14/2023      03/13/2019  Imm Admin: Pneumococcal polysaccharide vaccine (PPSV-23)              Overdue - Annual Wellness Visit (Yearly) Overdue since 10/24/2023      10/24/2022  Visit Dx: Medicare annual wellness visit, subsequent    06/23/2021  Subsequent Annual Wellness Visit - Includes PPPS ()    06/23/2021  Visit Dx: Medicare annual wellness visit, subsequent    05/26/2020  Visit Dx: Medicare annual wellness visit, initial              Overdue - Influenza Vaccine (1) Overdue since 9/1/2024      10/24/2022  Imm Admin: Influenza Vaccine Quad Inj (Pf)              Overdue - COVID-19 Vaccine (4 - 2024-25 season) Overdue since 9/1/2024      10/31/2021  Imm Admin:  PFIZER PURPLE CAP SARS-COV-2 VACCINATION (12+)    03/31/2021  Imm Admin: PFIZER PURPLE CAP SARS-COV-2 VACCINATION (12+)    03/10/2021  Imm Admin: PFIZER PURPLE CAP SARS-COV-2 VACCINATION (12+)              Hepatitis A Vaccine (Hep A) (Series Information) Aged Out      No completion history exists for this topic.              Hepatitis B Vaccine (Hep B) (Series Information) Aged Out      No completion history exists for this topic.              HPV Vaccines (Series Information) Aged Out      No completion history exists for this topic.              Polio Vaccine (Inactivated Polio) (Series Information) Aged Out      No completion history exists for this topic.              Meningococcal Immunization (Series Information) Aged Out      No completion history exists for this topic.                    Patient Care Team:  ALEX Fisher as PCP - General (Family Medicine)  [facility] as Res[facility] Therapist  Alexi Faith M.D. (Internal Medicine Clinical Cardiac Electrophysiology)        Social History     Tobacco Use    Smoking status: Never    Smokeless tobacco: Never   Vaping Use    Vaping status: Never Used   Substance Use Topics    Alcohol use: Not Currently     Alcohol/week: 0.6 oz     Types: 1 Shots of liquor per week     Comment: one weekly    Drug use: Never     Family History   Problem Relation Age of Onset    Heart Disease Mother         atrial fib    Stroke Father 65        CVa    Diabetes Father     Cancer Sister         skin    No Known Problems Brother     Stroke Paternal Grandmother      He  has a past medical history of Anesthesia (09/17/2024), At risk for sleep apnea, Atrial fibrillation (HCC) (09/17/2024), Breath shortness, Cardiac asystole (HCC) (12/20/2016), Cardiac pacemaker in situ (12/20/2016), Family history of adverse effect to anesthesia, Gout, Hemorrhagic disorder (HCC) (09/17/2024), Idiopathic chronic gout of right foot without tophus (03/13/2019), Pneumonia (2000), PONV  "(postoperative nausea and vomiting) (09/17/2024), and Sleep apnea (09/17/2024).   Past Surgical History:   Procedure Laterality Date    OTHER CARDIAC SURGERY  12/20/2016    pacemaker x 2, last one placed 9/2024    OTHER ORTHOPEDIC SURGERY  06/01/1990    achilles tendon repair left foot    OTHER      7 cardioversions    OTHER      4 cardiac ablations       Exam:   Ht 1.753 m (5' 9\")   Wt (!) 126 kg (278 lb)  Body mass index is 41.05 kg/m².    Hearing good.    Dentition good  Constitutional: Alert, no distress.  Skin: Warm, dry, good turgor, no rashes in visible areas.  Eye: Equal, round and reactive, conjunctiva clear, lids normal.  ENMT: Lips without lesions, good dentition, oropharynx clear.  Neck: Trachea midline, no masses, no thyromegaly. No cervical or supraclavicular lymphadenopathy  Respiratory: Unlabored respiratory effort, lungs clear to auscultation, no wheezes, no ronchi.  Cardiovascular: Normal S1, S2, no murmur, no edema.  Abdomen: Soft, non-tender, no masses, no hepatosplenomegaly.  Psych: Alert and oriented x3, normal affect and mood.      Assessment and Plan. The following treatment and monitoring plan is recommended:      Assessment & Plan  1. Atrial fibrillation.  He has been under the care of cardiology and had a pacemaker placed. He underwent cardioversion in 11/2023 and again in 08/2024. He is currently on sotalol and Xarelto. He will continue to follow up with cardiology.    2. Prediabetes.  His fasting blood sugar is slightly elevated, and his A1c remains in the prediabetic range. He is advised to continue improving his diet and exercise regimen. An A1c test will be conducted during his next visit.    3. Gout.  His uric acid levels are elevated. A prescription for allopurinol will be sent to [facility] on [address].    4. Vitamin D deficiency.  His vitamin D levels are significantly low. A prescription for high-dose vitamin D will be provided, to be taken once weekly for 12 weeks, followed by a " transition to over-the-counter vitamin D supplements.    5. Health maintenance.  His PSA levels are stable, and his thyroid function is within normal limits. His blood pressure readings are satisfactory. A Cologuard test will be ordered. He is due for his second pneumonia vaccine and influenza vaccine, which will be administered today. He is also due for a shingles vaccine, which can be obtained from the pharmacy. A hepatitis C screening will be included in his next lab work. A CT scan will be ordered, and he will schedule the appointment. The results of the CT scan will be communicated to him. If the Cologuard test is positive, a colonoscopy will be necessary. If the CT scan reveals plaque buildup, treatment options will be discussed.    6. Cholesterol management.  His cholesterol levels are mildly elevated. He is advised to continue his exercise routine and maintain a healthy diet. If cholesterol levels do not improve, or CT CAC is elevated, starting a cholesterol medication may be considered.    7. Sleep apnea.  He is using CPAP regularly and reports feeling significantly better since its use.    Follow-up  The patient is scheduled for a follow-up visit in 6 months to monitor his blood sugar levels and overall health status.    PROCEDURE  Pacemaker implantation in 09/2024.  Cardioversion in 11/2023 and 08/2024.       1. Medicare annual wellness visit, initial    2. Typical atrial flutter ([facility])    3. Other persistent atrial fibrillation ([facility])    4. S/P ablation of atrial fibrillation    5. Cardiac pacemaker in situ    6. SSS (sick sinus syndrome) ([facility])    7. Hypercoagulable state ([facility])    8. Pure hypercholesterolemia  - CT-CARDIAC SCORING; Future    9. Prediabetes  - CT-CARDIAC SCORING; Future    10. Idiopathic chronic gout of right foot without tophus  - allopurinol (ZYLOPRIM) 300 MG Tab; Take 1 Tablet by mouth every day.  Dispense: 90 Tablet; Refill: 3    11. Morbid obesity with BMI of 40.0-44.9, adult ([facility])  -  CT-CARDIAC SCORING; Future    12. ARCHANA (obstructive sleep apnea)    13. Vitamin D deficiency  - ergocalciferol (DRISDOL) 43530 UNIT capsule; Take 1 Capsule by mouth every 7 days.  Dispense: 12 Capsule; Refill: 0    14. Screening for colorectal cancer  - Cologuard® colon cancer screening    15. Need for vaccination  - Pneumococcal Conjugate Vaccine 20-Valent (6 wks+)    Other orders  - sotalol (BETAPACE) 80 MG Tab; Take 80 mg by mouth 2 times a day.      Services suggested: No services needed at this time  Health Care Screening: Age-appropriate preventive services recommended by USPTF and ACIP covered by Medicare were discussed today. Services ordered if indicated and agreed upon by the patient.  Referrals offered: Community-based lifestyle interventions to reduce health risks and promote self-management and wellness, fall prevention, nutrition, physical activity, tobacco-use cessation, weight loss, and mental health services as per orders if indicated.    Discussion today about general wellness and lifestyle habits:    Prevent falls and reduce trip hazards; Cautioned about securing or removing rugs.  Have a working fire alarm and carbon monoxide detector;   Engage in regular physical activity and social activities     Follow-up: Return in about 6 months (around 7/20/2025).

## 2025-01-28 ENCOUNTER — HOSPITAL ENCOUNTER (OUTPATIENT)
Dept: RADIOLOGY | Facility: MEDICAL CENTER | Age: 65
End: 2025-01-28
Attending: NURSE PRACTITIONER
Payer: COMMERCIAL

## 2025-01-28 DIAGNOSIS — R73.03 PREDIABETES: ICD-10-CM

## 2025-01-28 DIAGNOSIS — E66.01 MORBID OBESITY WITH BMI OF 40.0-44.9, ADULT (HCC): ICD-10-CM

## 2025-01-28 DIAGNOSIS — E78.00 PURE HYPERCHOLESTEROLEMIA: ICD-10-CM

## 2025-01-28 PROCEDURE — 4410556 CT-CARDIAC SCORING (SELF PAY ONLY)

## 2025-02-25 ENCOUNTER — HOSPITAL ENCOUNTER (OUTPATIENT)
Dept: LAB | Facility: MEDICAL CENTER | Age: 65
End: 2025-02-25
Attending: NURSE PRACTITIONER
Payer: MEDICARE

## 2025-02-25 DIAGNOSIS — Z79.899 HIGH RISK MEDICATION USE: ICD-10-CM

## 2025-02-25 DIAGNOSIS — I48.0 PAROXYSMAL ATRIAL FIBRILLATION (HCC): ICD-10-CM

## 2025-02-25 LAB
ALBUMIN SERPL BCP-MCNC: 3.8 G/DL (ref 3.2–4.9)
ALBUMIN/GLOB SERPL: 1.1 G/DL
ALP SERPL-CCNC: 72 U/L (ref 30–99)
ALT SERPL-CCNC: 27 U/L (ref 2–50)
ANION GAP SERPL CALC-SCNC: 10 MMOL/L (ref 7–16)
AST SERPL-CCNC: 21 U/L (ref 12–45)
BILIRUB SERPL-MCNC: 0.5 MG/DL (ref 0.1–1.5)
BUN SERPL-MCNC: 10 MG/DL (ref 8–22)
CALCIUM ALBUM COR SERPL-MCNC: 8.9 MG/DL (ref 8.5–10.5)
CALCIUM SERPL-MCNC: 8.7 MG/DL (ref 8.5–10.5)
CHLORIDE SERPL-SCNC: 105 MMOL/L (ref 96–112)
CO2 SERPL-SCNC: 25 MMOL/L (ref 20–33)
CREAT SERPL-MCNC: 0.96 MG/DL (ref 0.5–1.4)
GFR SERPLBLD CREATININE-BSD FMLA CKD-EPI: 87 ML/MIN/1.73 M 2
GLOBULIN SER CALC-MCNC: 3.4 G/DL (ref 1.9–3.5)
GLUCOSE SERPL-MCNC: 95 MG/DL (ref 65–99)
MAGNESIUM SERPL-MCNC: 1.9 MG/DL (ref 1.5–2.5)
POTASSIUM SERPL-SCNC: 4.3 MMOL/L (ref 3.6–5.5)
PROT SERPL-MCNC: 7.2 G/DL (ref 6–8.2)
SODIUM SERPL-SCNC: 140 MMOL/L (ref 135–145)

## 2025-02-25 PROCEDURE — 80053 COMPREHEN METABOLIC PANEL: CPT

## 2025-02-25 PROCEDURE — 36415 COLL VENOUS BLD VENIPUNCTURE: CPT

## 2025-02-25 PROCEDURE — 83735 ASSAY OF MAGNESIUM: CPT

## 2025-02-26 ENCOUNTER — RESULTS FOLLOW-UP (OUTPATIENT)
Dept: CARDIOLOGY | Facility: MEDICAL CENTER | Age: 65
End: 2025-02-26

## 2025-03-08 DIAGNOSIS — E55.9 VITAMIN D DEFICIENCY: ICD-10-CM

## 2025-03-10 RX ORDER — ERGOCALCIFEROL 1.25 MG/1
50000 CAPSULE, LIQUID FILLED ORAL
Qty: 12 CAPSULE | Refills: 0 | OUTPATIENT
Start: 2025-03-10

## 2025-03-10 NOTE — TELEPHONE ENCOUNTER
Received request via: Pharmacy    Was the patient seen in the last year in this department? Yes    Does the patient have an active prescription (recently filled or refills available) for medication(s) requested? No    [facility]     Does the patient have care home Plus and need 100-day supply? (This applies to ALL medications) Patient does not have SCP

## 2025-04-07 ENCOUNTER — NON-PROVIDER VISIT (OUTPATIENT)
Dept: CARDIOLOGY | Facility: MEDICAL CENTER | Age: 65
End: 2025-04-07
Payer: MEDICARE

## 2025-04-08 PROCEDURE — 93294 REM INTERROG EVL PM/LDLS PM: CPT | Performed by: INTERNAL MEDICINE

## 2025-04-08 NOTE — CARDIAC REMOTE MONITOR - SCAN
Device transmission reviewed. Device demonstrated appropriate function.       Electronically Signed by: Giovani Pena M.D.    4/10/2025  4:39 PM

## 2025-04-21 ENCOUNTER — APPOINTMENT (OUTPATIENT)
Dept: MEDICAL GROUP | Facility: IMAGING CENTER | Age: 65
End: 2025-04-21
Payer: MEDICARE

## 2025-05-13 ENCOUNTER — NON-PROVIDER VISIT (OUTPATIENT)
Dept: CARDIOLOGY | Facility: MEDICAL CENTER | Age: 65
End: 2025-05-13
Attending: NURSE PRACTITIONER
Payer: MEDICARE

## 2025-05-13 ENCOUNTER — NON-PROVIDER VISIT (OUTPATIENT)
Dept: CARDIOLOGY | Facility: MEDICAL CENTER | Age: 65
End: 2025-05-13

## 2025-05-13 DIAGNOSIS — I49.5 SSS (SICK SINUS SYNDROME) (HCC): ICD-10-CM

## 2025-05-13 DIAGNOSIS — Z95.0 CARDIAC PACEMAKER IN SITU: ICD-10-CM

## 2025-05-13 PROCEDURE — 93280 PM DEVICE PROGR EVAL DUAL: CPT | Performed by: INTERNAL MEDICINE

## 2025-05-13 PROCEDURE — 93280 PM DEVICE PROGR EVAL DUAL: CPT | Mod: 26 | Performed by: INTERNAL MEDICINE

## 2025-05-13 NOTE — CARDIAC REMOTE MONITOR - SCAN
Device transmission reviewed. Device demonstrated appropriate function.       Electronically Signed by: Giovani Pena M.D.    5/15/2025  9:00 AM

## 2025-05-29 DIAGNOSIS — I48.4 ATYPICAL ATRIAL FLUTTER (HCC): ICD-10-CM

## 2025-05-29 RX ORDER — SOTALOL HYDROCHLORIDE 120 MG/1
120 TABLET ORAL 2 TIMES DAILY
Qty: 180 TABLET | Refills: 0 | Status: SHIPPED | OUTPATIENT
Start: 2025-05-29

## 2025-06-10 ENCOUNTER — PATIENT MESSAGE (OUTPATIENT)
Dept: CARDIOLOGY | Facility: MEDICAL CENTER | Age: 65
End: 2025-06-10
Payer: MEDICARE

## 2025-06-11 ENCOUNTER — PATIENT MESSAGE (OUTPATIENT)
Dept: CARDIOLOGY | Facility: MEDICAL CENTER | Age: 65
End: 2025-06-11
Payer: MEDICARE

## 2025-06-11 NOTE — PATIENT COMMUNICATION
[facility] Device Team: Can you pull a transmission per the patient request? He thinks he may be in A fib.

## 2025-06-13 ENCOUNTER — PATIENT MESSAGE (OUTPATIENT)
Dept: CARDIOLOGY | Facility: MEDICAL CENTER | Age: 65
End: 2025-06-13
Payer: MEDICARE

## 2025-06-13 ENCOUNTER — TELEPHONE (OUTPATIENT)
Dept: CARDIOLOGY | Facility: MEDICAL CENTER | Age: 65
End: 2025-06-13
Payer: MEDICARE

## 2025-06-13 DIAGNOSIS — I48.0 PAROXYSMAL ATRIAL FIBRILLATION (HCC): ICD-10-CM

## 2025-06-13 DIAGNOSIS — I48.4 ATYPICAL ATRIAL FLUTTER (HCC): Primary | ICD-10-CM

## 2025-06-13 NOTE — TELEPHONE ENCOUNTER
S/w patient regarding EA recommendations and he verbalized understanding. Given ER precautions for worsening symptoms and he verbalized understanding. Patient stated he has not missed a dose of his Xarelto. DCCV order placed.

## 2025-06-13 NOTE — PROGRESS NOTES
We should get him scheduled for a cardioversion. Please add ARACELI if he has missed any doses of his anticoagulation in the last 4 weeks.    If symptomatic he should go in the the ER for cardioversion.     Should also have a follow up to discussion of additional steps for rhythm control and  potential hybrid MAZE.     Thanks   Roberta

## 2025-06-13 NOTE — TELEPHONE ENCOUNTER
Schedulers- Please see EA message below, can we please get patient in sooner.    Alfreda- Please see EA message below, DCCV ordered. Thank you!

## 2025-06-17 NOTE — TELEPHONE ENCOUNTER
Patient is scheduled for a Cardioversion without anesthesia on 06- with Dr. Horn. The patient has been instructed to check in at 7:00 am for the 9:00 procedure. No meds to hold. Patient has been advised they will need a  home and with them after since they are sedated. Message sent to authorizations. Sent OB10 message to the patient with instructions. Juan M at [facility] notified of the case. TERRY sent to Justian and Roberta

## 2025-06-19 ENCOUNTER — TELEPHONE (OUTPATIENT)
Dept: CARDIOLOGY | Facility: MEDICAL CENTER | Age: 65
End: 2025-06-19
Payer: MEDICARE

## 2025-06-19 ENCOUNTER — APPOINTMENT (OUTPATIENT)
Dept: ADMISSIONS | Facility: MEDICAL CENTER | Age: 65
End: 2025-06-19
Attending: INTERNAL MEDICINE
Payer: MEDICARE

## 2025-06-19 NOTE — TELEPHONE ENCOUNTER
Call placed to patient to discuss symptoms. Patient reports he is feeling tired and out of breath/ winded. Patient reports his heart rate has been staying around 89-90. No other symptoms at this time.    EA- please review updated transmission in media and advise if any further recommendations. Thank you.

## 2025-06-19 NOTE — TELEPHONE ENCOUNTER
FYI - remote transmission received via home monitor, pt presenting in persistent AF at time of transmission, pt scheduled for DCCV 6/24/2025, full report scanned into Media.    Patient presenting in persistent AF/AFL  -Onset: 6/10/2025 @ 10:17 AM  -Average V Rate:94 bpm  -Patient on OAC? Yes

## 2025-06-20 ENCOUNTER — PRE-ADMISSION TESTING (OUTPATIENT)
Dept: ADMISSIONS | Facility: MEDICAL CENTER | Age: 65
End: 2025-06-20
Attending: INTERNAL MEDICINE
Payer: MEDICARE

## 2025-06-20 NOTE — PREADMIT AVS NOTE
Current Medications   Medication Instructions    sotalol (BETAPACE) 120 MG tablet Follow instructions from surgeon or specialist.    allopurinol (ZYLOPRIM) 300 MG Tab Follow instructions from surgeon or specialist.    rivaroxaban (XARELTO) 20 MG Tab tablet Follow instructions from surgeon or specialist.    Potassium (POTASSIMIN PO) Follow instructions from surgeon or specialist.    Magnesium 500 MG Tab Follow instructions from surgeon or specialist.

## 2025-06-23 ENCOUNTER — PRE-ADMISSION TESTING (OUTPATIENT)
Dept: ADMISSIONS | Facility: MEDICAL CENTER | Age: 65
End: 2025-06-23
Attending: INTERNAL MEDICINE
Payer: MEDICARE

## 2025-06-23 DIAGNOSIS — Z01.812 PRE-OPERATIVE LABORATORY EXAMINATION: Primary | ICD-10-CM

## 2025-06-23 LAB
ALBUMIN SERPL BCP-MCNC: 4.1 G/DL (ref 3.2–4.9)
ALBUMIN/GLOB SERPL: 1.3 G/DL
ALP SERPL-CCNC: 82 U/L (ref 30–99)
ALT SERPL-CCNC: 32 U/L (ref 2–50)
ANION GAP SERPL CALC-SCNC: 12 MMOL/L (ref 7–16)
AST SERPL-CCNC: 26 U/L (ref 12–45)
BILIRUB SERPL-MCNC: 0.6 MG/DL (ref 0.1–1.5)
BUN SERPL-MCNC: 15 MG/DL (ref 8–22)
CALCIUM ALBUM COR SERPL-MCNC: 8.8 MG/DL (ref 8.5–10.5)
CALCIUM SERPL-MCNC: 8.9 MG/DL (ref 8.5–10.5)
CHLORIDE SERPL-SCNC: 106 MMOL/L (ref 96–112)
CO2 SERPL-SCNC: 20 MMOL/L (ref 20–33)
CREAT SERPL-MCNC: 0.82 MG/DL (ref 0.5–1.4)
ERYTHROCYTE [DISTWIDTH] IN BLOOD BY AUTOMATED COUNT: 45.3 FL (ref 35.9–50)
GFR SERPLBLD CREATININE-BSD FMLA CKD-EPI: 96 ML/MIN/1.73 M 2
GLOBULIN SER CALC-MCNC: 3.1 G/DL (ref 1.9–3.5)
GLUCOSE SERPL-MCNC: 94 MG/DL (ref 65–99)
HCT VFR BLD AUTO: 44.8 % (ref 42–52)
HGB BLD-MCNC: 14.9 G/DL (ref 14–18)
INR PPP: 1.97 (ref 0.87–1.13)
MCH RBC QN AUTO: 30.3 PG (ref 27–33)
MCHC RBC AUTO-ENTMCNC: 33.3 G/DL (ref 32.3–36.5)
MCV RBC AUTO: 91.1 FL (ref 81.4–97.8)
PLATELET # BLD AUTO: 178 K/UL (ref 164–446)
PMV BLD AUTO: 11.6 FL (ref 9–12.9)
POTASSIUM SERPL-SCNC: 4.3 MMOL/L (ref 3.6–5.5)
PROT SERPL-MCNC: 7.2 G/DL (ref 6–8.2)
PROTHROMBIN TIME: 22.5 SEC (ref 12–14.6)
RBC # BLD AUTO: 4.92 M/UL (ref 4.7–6.1)
SODIUM SERPL-SCNC: 138 MMOL/L (ref 135–145)
WBC # BLD AUTO: 6.7 K/UL (ref 4.8–10.8)

## 2025-06-23 PROCEDURE — 85027 COMPLETE CBC AUTOMATED: CPT

## 2025-06-23 PROCEDURE — 85610 PROTHROMBIN TIME: CPT

## 2025-06-23 PROCEDURE — 36415 COLL VENOUS BLD VENIPUNCTURE: CPT

## 2025-06-23 PROCEDURE — 80053 COMPREHEN METABOLIC PANEL: CPT

## 2025-06-23 NOTE — TELEPHONE ENCOUNTER
Phone Number Called: [phone]     Call outcome: Spoke to patient regarding message below.    Message: Called to inform patient of EA recommendations below. Patient verbalized understanding. No further questions at this time.     ----------------    ALEX Rodriguez      6/23/25 10:26 AM  Note     NO further recommendations at this time. Will await dccv and discuss potential hybrid MAZE at follow up.

## 2025-06-24 ENCOUNTER — APPOINTMENT (OUTPATIENT)
Dept: CARDIOLOGY | Facility: MEDICAL CENTER | Age: 65
End: 2025-06-24
Attending: NURSE PRACTITIONER
Payer: MEDICARE

## 2025-06-24 ENCOUNTER — HOSPITAL ENCOUNTER (OUTPATIENT)
Facility: MEDICAL CENTER | Age: 65
End: 2025-06-24
Attending: INTERNAL MEDICINE | Admitting: INTERNAL MEDICINE
Payer: MEDICARE

## 2025-06-24 VITALS
DIASTOLIC BLOOD PRESSURE: 103 MMHG | HEART RATE: 94 BPM | OXYGEN SATURATION: 94 % | HEIGHT: 69 IN | WEIGHT: 293.87 LBS | TEMPERATURE: 96.6 F | BODY MASS INDEX: 43.53 KG/M2 | RESPIRATION RATE: 16 BRPM | SYSTOLIC BLOOD PRESSURE: 143 MMHG

## 2025-06-24 DIAGNOSIS — I48.4 ATYPICAL ATRIAL FLUTTER (HCC): ICD-10-CM

## 2025-06-24 DIAGNOSIS — I48.0 PAROXYSMAL ATRIAL FIBRILLATION (HCC): ICD-10-CM

## 2025-06-24 LAB — EKG IMPRESSION: NORMAL

## 2025-06-24 PROCEDURE — 93005 ELECTROCARDIOGRAM TRACING: CPT | Mod: TC | Performed by: INTERNAL MEDICINE

## 2025-06-24 PROCEDURE — 93010 ELECTROCARDIOGRAM REPORT: CPT | Mod: 59 | Performed by: INTERNAL MEDICINE

## 2025-06-24 PROCEDURE — 160015 HCHG STAT PREOP MINUTES

## 2025-06-24 PROCEDURE — 93724 ELEC ALYS ANTITCHYCAR PM SYS: CPT | Mod: 26 | Performed by: INTERNAL MEDICINE

## 2025-06-24 ASSESSMENT — FIBROSIS 4 INDEX: FIB4 SCORE: 1.73

## 2025-06-24 NOTE — PROGRESS NOTES
Patient presenting in 2:1 flutter. Discussed risks benefits and alternatives to trial of pace termination of arrhythmia and patient agreeable.     [employer] pacemaker used to overdrive pace patient out of AFL. Rhythm successfully pace terminated to Sinus with intermittent A pacing. Patient tolerated procedure well though did experience pectoralis capture which was distressing to him.

## 2025-06-24 NOTE — OR NURSING
@0839 [employer] at bedside to interrogate pacemaker. Dr. Horn at bedside. [employer] able to pace patient out of atrial flutter. Case canceled. Discharge order received.    @5225 PIV removed. PT able to dress self without assistance.    @7701 Pt discharged to home with all belongings

## 2025-07-07 ENCOUNTER — NON-PROVIDER VISIT (OUTPATIENT)
Dept: CARDIOLOGY | Facility: MEDICAL CENTER | Age: 65
End: 2025-07-07
Payer: MEDICARE

## 2025-07-07 PROCEDURE — 93294 REM INTERROG EVL PM/LDLS PM: CPT | Performed by: INTERNAL MEDICINE

## 2025-07-08 NOTE — CARDIAC REMOTE MONITOR - SCAN
Device transmission reviewed. Device demonstrated appropriate function.       Electronically Signed by: Giovani Pena M.D.    7/10/2025  8:06 AM

## 2025-07-10 ENCOUNTER — APPOINTMENT (OUTPATIENT)
Dept: CARDIOLOGY | Facility: MEDICAL CENTER | Age: 65
End: 2025-07-10
Attending: NURSE PRACTITIONER
Payer: MEDICARE

## 2025-07-17 ENCOUNTER — APPOINTMENT (OUTPATIENT)
Dept: MEDICAL GROUP | Facility: IMAGING CENTER | Age: 65
End: 2025-07-17
Payer: MEDICARE

## 2025-07-17 VITALS
BODY MASS INDEX: 44.01 KG/M2 | WEIGHT: 297.13 LBS | OXYGEN SATURATION: 96 % | TEMPERATURE: 97.7 F | HEART RATE: 60 BPM | SYSTOLIC BLOOD PRESSURE: 122 MMHG | HEIGHT: 69 IN | DIASTOLIC BLOOD PRESSURE: 80 MMHG

## 2025-07-17 DIAGNOSIS — R73.03 PREDIABETES: Primary | ICD-10-CM

## 2025-07-17 DIAGNOSIS — Z13.228 ENCOUNTER FOR SCREENING FOR METABOLIC DISORDER: ICD-10-CM

## 2025-07-17 DIAGNOSIS — E78.00 PURE HYPERCHOLESTEROLEMIA: ICD-10-CM

## 2025-07-17 DIAGNOSIS — R93.1 AGATSTON CAC SCORE, <100: ICD-10-CM

## 2025-07-17 DIAGNOSIS — E55.9 VITAMIN D DEFICIENCY: ICD-10-CM

## 2025-07-17 DIAGNOSIS — Z13.29 SCREENING FOR THYROID DISORDER: ICD-10-CM

## 2025-07-17 DIAGNOSIS — Z12.5 SCREENING PSA (PROSTATE SPECIFIC ANTIGEN): ICD-10-CM

## 2025-07-17 DIAGNOSIS — Z13.0 ENCOUNTER FOR SCREENING FOR HEMATOLOGIC DISORDER: ICD-10-CM

## 2025-07-17 DIAGNOSIS — M1A.0710 IDIOPATHIC CHRONIC GOUT OF RIGHT FOOT WITHOUT TOPHUS: ICD-10-CM

## 2025-07-17 DIAGNOSIS — Z11.59 NEED FOR HEPATITIS C SCREENING TEST: ICD-10-CM

## 2025-07-17 LAB
HBA1C MFR BLD: 5.9 % (ref ?–5.8)
POCT INT CON NEG: NEGATIVE
POCT INT CON POS: POSITIVE

## 2025-07-17 PROCEDURE — 99214 OFFICE O/P EST MOD 30 MIN: CPT | Performed by: NURSE PRACTITIONER

## 2025-07-17 PROCEDURE — 83036 HEMOGLOBIN GLYCOSYLATED A1C: CPT | Performed by: NURSE PRACTITIONER

## 2025-07-17 PROCEDURE — 3074F SYST BP LT 130 MM HG: CPT | Performed by: NURSE PRACTITIONER

## 2025-07-17 PROCEDURE — 3079F DIAST BP 80-89 MM HG: CPT | Performed by: NURSE PRACTITIONER

## 2025-07-17 ASSESSMENT — FIBROSIS 4 INDEX: FIB4 SCORE: 1.73

## 2025-07-17 NOTE — PROGRESS NOTES
Subjective:     History of Present Illness  The patient presents for CT results review, prediabetes, atrial fibrillation, fatty liver disease, and health maintenance.    He has been diagnosed with prediabetes, but his blood sugar levels have shown a slight decrease since the last test. He acknowledges that his diet was not optimal last year due to the loss of his wife, which led to irregular eating habits. Currently, he is focusing on weight loss and has resumed exercising three days a week. He prefers to lose weight through natural means rather than medication. His diet includes lean proteins and vegetables, but he reports a high carbohydrate intake and frequent snacking. He does not consume alcohol.    He is due for colon cancer screening, which he has never undergone before. There is no family history of colon cancer. He has a Cologuard test kit at home from 01/2025 that he has not yet used.    He has not taken any cholesterol medication in the past.     He experiences atrial fibrillation episodes every 6 to 8 months, which makes it difficult for him to maintain his exercise routine during these periods. He has undergone four ablations and one cardioversion procedure, which have improved his condition. He also has a pacemaker implanted.    He has fatty liver disease.    Marital Status:   Diet: Includes lean proteins and vegetables, high carbohydrate intake, frequent snacking  Alcohol: Does not consume alcohol    PAST SURGICAL HISTORY:  - Four ablations  - One cardioversion procedure  - Pacemaker implantation    FAMILY HISTORY  He reports no family history of colon cancer.      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    ROS   No chest pain, no shortness of breath, no abdominal pain  Positive ROS as per HPI.  All other systems reviewed and are negative.     Objective:     /80 (BP Location: Left arm, Patient Position: Sitting, BP Cuff Size: Large adult)   Pulse 60   Temp 36.5 °C (97.7 °F) (Temporal)   Ht 1.753 m (5' 9\")   Wt (!) 135 kg (297 lb 2 oz)   SpO2 96%  Body mass index is 43.88 kg/m².   Physical Exam    Constitutional: Alert, no distress.  Skin: Warm, dry, good turgor, no rashes in visible areas.  Eye: Equal, round and reactive, conjunctiva clear, lids normal.  ENMT: Lips without lesions, good dentition  Respiratory: Unlabored respiratory effort  Psych: Alert and oriented x3, normal affect and mood.      Results  Labs   - Blood Glucose Test: Blood sugar levels have decreased    Imaging   - CT scan of the heart and upper abdomen: Small amount of plaque building up in the LAD artery. The rest of the arteries looked good. Heart size was normal. Lungs were clear. Fatty liver disease was observed in the upper abdomen.        Assessment and Plan:   The following treatment plan was discussed    Assessment & Plan  1. Prediabetes.  - Blood sugar levels have shown a slight decrease since the last test.  - Advised to maintain a healthy diet rich in lean proteins and vegetables, while limiting carbohydrate intake.  - Regular exercise is recommended.  - A1C stable at 5.9 today, will repeat in January    2. Atrial Fibrillation.  - Advised to continue current management plan.  - Follow-up with Dr. Faith in 08/2025 for further evaluation.  - Cardioversion performed recently    3. Fatty Liver Disease.  - " CT scan revealed fatty liver disease, common in prediabetic individuals.  - Advised to work on weight loss through diet and exercise.  - Blood pressure is stable.    4. Health Maintenance.  - Due for a colonoscopy but prefers Cologuard test.  - Cologuard test will be ordered; instructed to complete at home and send back.  - If positive, screening colonoscopy will be necessary.  - Hepatitis C screening blood test will be ordered with next set of labs.  - Advised to discuss starting cholesterol medication with Dr. Faith during next visit.    5. Hyperlipidemia with CAC   Unstable  Discussed dietary modifications, exercise, medication management  He would like to try lifestyle changes first and hold off on medication at this time  Advised he discuss with Dr. Faith at next appointment as well  Plan to repeat CAC in 3 years  Will repeat labs in January, if no improvement, will discuss statin management again with patient.     Follow-up  - Follow-up in 01/2026.      ORDERS:  1. Prediabetes (Primary)  - POCT Hemoglobin A1C  - HEMOGLOBIN A1C; Future    2. Pure hypercholesterolemia  - Lipid Profile; Future    3. Agatston CAC score, <100    4. Vitamin D deficiency  - VITAMIN D,25 HYDROXY (DEFICIENCY); Future    5. Screening PSA (prostate specific antigen)  - PROSTATE SPECIFIC AG SCREENING; Future    6. Screening for thyroid disorder  - TSH WITH REFLEX TO FT4; Future    7. Encounter for screening for hematologic disorder  - CBC WITH DIFFERENTIAL; Future    8. Encounter for screening for metabolic disorder  - Comp Metabolic Panel; Future    9. Idiopathic chronic gout of right foot without tophus  - URIC ACID; Future    10. Need for hepatitis C screening test  - HEP C VIRUS ANTIBODY; Future          The MA is performing the above orders under the direction of Dr. Gudino or Dr. Silveira    Please note that this dictation was created using voice recognition software. I have made every reasonable attempt to correct obvious errors, but I expect  that there are errors of grammar and possibly content that I did not discover before finalizing the note.      Attestation      Verbal consent was acquired by the patient to use [employer] ambient listening note generation during this visit Yes                  [1]   Current Outpatient Medications   Medication Sig Dispense Refill    sotalol (BETAPACE) 120 MG tablet TAKE 1 TABLET BY MOUTH TWICE A  Tablet 0    allopurinol (ZYLOPRIM) 300 MG Tab Take 1 Tablet by mouth every day. 90 Tablet 3    ergocalciferol (DRISDOL) 80653 UNIT capsule Take 1 Capsule by mouth every 7 days. 12 Capsule 0    rivaroxaban (XARELTO) 20 MG Tab tablet Take 1 Tablet by mouth with dinner. 90 Tablet 3    Potassium (POTASSIMIN PO) Take 2 Tablets by mouth every day. OTC strength      Magnesium 500 MG Tab Take 500 mg by mouth every day.          No current facility-administered medications for this visit.

## 2025-08-12 ENCOUNTER — APPOINTMENT (OUTPATIENT)
Dept: CARDIOLOGY | Facility: MEDICAL CENTER | Age: 65
End: 2025-08-12
Attending: INTERNAL MEDICINE
Payer: MEDICARE

## 2025-08-14 ENCOUNTER — APPOINTMENT (OUTPATIENT)
Dept: MEDICAL GROUP | Facility: IMAGING CENTER | Age: 65
End: 2025-08-14
Payer: MEDICARE

## 2025-08-21 ENCOUNTER — OFFICE VISIT (OUTPATIENT)
Dept: CARDIOLOGY | Facility: MEDICAL CENTER | Age: 65
End: 2025-08-21
Attending: INTERNAL MEDICINE
Payer: MEDICARE

## 2025-08-21 VITALS
RESPIRATION RATE: 24 BRPM | HEART RATE: 63 BPM | HEIGHT: 69 IN | SYSTOLIC BLOOD PRESSURE: 122 MMHG | WEIGHT: 294 LBS | DIASTOLIC BLOOD PRESSURE: 76 MMHG | OXYGEN SATURATION: 96 % | BODY MASS INDEX: 43.55 KG/M2

## 2025-08-21 DIAGNOSIS — Z51.81 ENCOUNTER FOR MONITORING SOTALOL THERAPY: ICD-10-CM

## 2025-08-21 DIAGNOSIS — Z95.0 CARDIAC PACEMAKER IN SITU: ICD-10-CM

## 2025-08-21 DIAGNOSIS — Z79.899 ENCOUNTER FOR MONITORING SOTALOL THERAPY: ICD-10-CM

## 2025-08-21 DIAGNOSIS — I48.4 ATYPICAL ATRIAL FLUTTER (HCC): Primary | ICD-10-CM

## 2025-08-21 LAB — EKG IMPRESSION: NORMAL

## 2025-08-21 PROCEDURE — 93280 PM DEVICE PROGR EVAL DUAL: CPT | Performed by: INTERNAL MEDICINE

## 2025-08-21 PROCEDURE — 99212 OFFICE O/P EST SF 10 MIN: CPT | Performed by: INTERNAL MEDICINE

## 2025-08-21 PROCEDURE — 93005 ELECTROCARDIOGRAM TRACING: CPT | Mod: TC | Performed by: INTERNAL MEDICINE

## 2025-08-21 RX ORDER — SOTALOL HYDROCHLORIDE 80 MG/1
160 TABLET ORAL 2 TIMES DAILY
Qty: 360 TABLET | Refills: 3 | Status: SHIPPED | OUTPATIENT
Start: 2025-08-21

## 2025-08-21 ASSESSMENT — LIFESTYLE VARIABLES
SKIP TO QUESTIONS 9-10: 1
AUDIT-C TOTAL SCORE: 2
HOW OFTEN DO YOU HAVE A DRINK CONTAINING ALCOHOL: 2-4 TIMES A MONTH
HOW OFTEN DO YOU HAVE SIX OR MORE DRINKS ON ONE OCCASION: NEVER
HOW MANY STANDARD DRINKS CONTAINING ALCOHOL DO YOU HAVE ON A TYPICAL DAY: 1 OR 2

## 2025-08-21 ASSESSMENT — FIBROSIS 4 INDEX: FIB4 SCORE: 1.73

## 2026-01-22 ENCOUNTER — APPOINTMENT (OUTPATIENT)
Dept: MEDICAL GROUP | Facility: IMAGING CENTER | Age: 66
End: 2026-01-22
Payer: MEDICARE